# Patient Record
Sex: FEMALE | Race: WHITE | Employment: OTHER | URBAN - NONMETROPOLITAN AREA
[De-identification: names, ages, dates, MRNs, and addresses within clinical notes are randomized per-mention and may not be internally consistent; named-entity substitution may affect disease eponyms.]

---

## 2017-10-13 ENCOUNTER — HOSPITAL ENCOUNTER (OUTPATIENT)
Dept: VASCULAR LAB | Age: 47
Discharge: HOME | End: 2017-10-13
Payer: COMMERCIAL

## 2017-10-13 DIAGNOSIS — I73.9 CLAUDICATION IN PERIPHERAL VASCULAR DISEASE (HCC): ICD-10-CM

## 2017-10-13 PROCEDURE — 93923 UPR/LXTR ART STDY 3+ LVLS: CPT

## 2018-03-21 ENCOUNTER — APPOINTMENT (OUTPATIENT)
Dept: GENERAL RADIOLOGY | Age: 48
End: 2018-03-21
Payer: COMMERCIAL

## 2018-03-21 ENCOUNTER — HOSPITAL ENCOUNTER (EMERGENCY)
Age: 48
Discharge: HOME | End: 2018-03-21
Payer: COMMERCIAL

## 2018-03-21 VITALS
SYSTOLIC BLOOD PRESSURE: 122 MMHG | OXYGEN SATURATION: 98 % | TEMPERATURE: 98.5 F | HEART RATE: 88 BPM | DIASTOLIC BLOOD PRESSURE: 76 MMHG | RESPIRATION RATE: 18 BRPM

## 2018-03-21 DIAGNOSIS — H66.90 ACUTE OTITIS MEDIA, UNSPECIFIED OTITIS MEDIA TYPE: Primary | ICD-10-CM

## 2018-03-21 DIAGNOSIS — J01.90 ACUTE NON-RECURRENT SINUSITIS, UNSPECIFIED LOCATION: ICD-10-CM

## 2018-03-21 LAB
DIRECT EXAM: NORMAL
Lab: NORMAL
Lab: NORMAL
SPECIMEN DESCRIPTION: NORMAL
SPECIMEN DESCRIPTION: NORMAL
STATUS: NORMAL
STATUS: NORMAL

## 2018-03-21 PROCEDURE — 99283 EMERGENCY DEPT VISIT LOW MDM: CPT

## 2018-03-21 PROCEDURE — 71046 X-RAY EXAM CHEST 2 VIEWS: CPT

## 2018-03-21 PROCEDURE — 96372 THER/PROPH/DIAG INJ SC/IM: CPT

## 2018-03-21 PROCEDURE — 87651 STREP A DNA AMP PROBE: CPT

## 2018-03-21 PROCEDURE — 6360000002 HC RX W HCPCS: Performed by: PHYSICIAN ASSISTANT

## 2018-03-21 PROCEDURE — 87804 INFLUENZA ASSAY W/OPTIC: CPT

## 2018-03-21 PROCEDURE — 6370000000 HC RX 637 (ALT 250 FOR IP): Performed by: PHYSICIAN ASSISTANT

## 2018-03-21 RX ORDER — AMOXICILLIN AND CLAVULANATE POTASSIUM 875; 125 MG/1; MG/1
1 TABLET, FILM COATED ORAL 2 TIMES DAILY
Qty: 20 TABLET | Refills: 0 | Status: SHIPPED | OUTPATIENT
Start: 2018-03-21 | End: 2018-03-31

## 2018-03-21 RX ORDER — DEXAMETHASONE SODIUM PHOSPHATE 10 MG/ML
6 INJECTION, SOLUTION INTRAMUSCULAR; INTRAVENOUS ONCE
Status: COMPLETED | OUTPATIENT
Start: 2018-03-21 | End: 2018-03-21

## 2018-03-21 RX ORDER — AMOXICILLIN AND CLAVULANATE POTASSIUM 875; 125 MG/1; MG/1
1 TABLET, FILM COATED ORAL ONCE
Status: COMPLETED | OUTPATIENT
Start: 2018-03-21 | End: 2018-03-21

## 2018-03-21 RX ADMIN — AMOXICILLIN AND CLAVULANATE POTASSIUM 1 TABLET: 875; 125 TABLET, FILM COATED ORAL at 21:09

## 2018-03-21 RX ADMIN — DEXAMETHASONE SODIUM PHOSPHATE 6 MG: 10 INJECTION, SOLUTION INTRAMUSCULAR; INTRAVENOUS at 21:07

## 2018-03-21 ASSESSMENT — PAIN DESCRIPTION - LOCATION: LOCATION: EAR;NECK;HEAD

## 2018-03-21 ASSESSMENT — PAIN DESCRIPTION - DESCRIPTORS: DESCRIPTORS: ACHING

## 2018-03-21 ASSESSMENT — PAIN DESCRIPTION - FREQUENCY: FREQUENCY: CONTINUOUS

## 2018-03-21 ASSESSMENT — PAIN SCALES - GENERAL: PAINLEVEL_OUTOF10: 5

## 2018-03-21 ASSESSMENT — PAIN DESCRIPTION - PAIN TYPE: TYPE: ACUTE PAIN

## 2018-03-22 LAB
DIRECT EXAM: NORMAL
DIRECT EXAM: NORMAL
Lab: NORMAL
SPECIMEN DESCRIPTION: NORMAL
SPECIMEN DESCRIPTION: NORMAL
STATUS: NORMAL

## 2018-03-22 ASSESSMENT — ENCOUNTER SYMPTOMS
DIARRHEA: 0
CHEST TIGHTNESS: 0
BLOOD IN STOOL: 0
BACK PAIN: 0
SORE THROAT: 0
WHEEZING: 0
EYE REDNESS: 0
CONSTIPATION: 0
ABDOMINAL PAIN: 0
EYE DISCHARGE: 0
NAUSEA: 0
RHINORRHEA: 0
SHORTNESS OF BREATH: 0
COUGH: 0
VOMITING: 0
SINUS PRESSURE: 1

## 2018-03-22 NOTE — ED PROVIDER NOTES
tracheal deviation, no edema, no erythema and normal range of motion present. No Brudzinski's sign and no Kernig's sign noted. Cardiovascular: Normal rate, regular rhythm and intact distal pulses. Exam reveals no gallop and no friction rub. No murmur heard. Pulmonary/Chest: Effort normal and breath sounds normal. No stridor. No respiratory distress. She has no wheezes. She has no rales. Abdominal: Soft. Bowel sounds are normal. She exhibits no distension. There is no tenderness. There is no rebound and no guarding. Musculoskeletal: Normal range of motion. She exhibits no edema, tenderness or deformity. Neurological: She is alert and oriented to person, place, and time. She has normal reflexes. No cranial nerve deficit. Skin: Skin is warm and dry. No rash noted. She is not diaphoretic. No erythema. Psychiatric: She has a normal mood and affect. Her behavior is normal.   Nursing note and vitals reviewed. DIAGNOSTIC RESULTS     EKG: All EKG's are interpreted by the Emergency Department Physician who either signs or Co-signs this chart in the absence of a cardiologist.      RADIOLOGY:   Non-plain film images such as CT, Ultrasound and MRI are read by the radiologist. Plain radiographic images are visualized and preliminarily interpreted by the emergency physician with the below findings:      Interpretation per the Radiologist below, if available at the time of this note:    XR CHEST STANDARD (2 VW)   Final Result   Impression:     1. No acute consolidation. Electronically Signed By: Kirk Tiwari   on  03/21/2018  19:38            ED BEDSIDE ULTRASOUND:   Performed by ED Physician - none    LABS:  Labs Reviewed   RAPID INFLUENZA A/B ANTIGENS   STREP SCREEN GROUP A THROAT   STREP A DNA PROBE, AMPLIFICATION       All other labs were within normal range or not returned as of this dictation.     EMERGENCY DEPARTMENT COURSE and DIFFERENTIAL DIAGNOSIS/MDM:   Vitals:    Vitals:    03/21/18 1812 BP: 122/76   Pulse: 88   Resp: 18   Temp: 98.5 °F (36.9 °C)   TempSrc: Tympanic   SpO2: 98%         MDM  78-year-old female who was seen by her primary care provider on Monday and told she might of had an ear infection. She presents with continued left ear discomfort and she states she thought she felt a little bump in her neck and it was painful. On exam, she has a bulging erythematous left TM she has a slight lymph nodes noted. There is full range of motion of the C-spine there is no muscular discomfort I think her discomfort is that the lymph node. She has some axillary discomfort and for sinuses. This point for completion I am going to screen for strep as well as influenza A get a chest x-ray she is a smoker with a cough. Rule out pneumonia bronchitis strep and influenza    Patient's workup is benign at this time. Again she has no headache has no neck pain she has discomfort along the lymph node which is on the same side where she does have an acute otitis media. I will switch her antibiotic to Augmentin. She has no meningeal signs. We will give her steroids. I have instructed her to call her primary care provider tomorrow she needs to be rechecked within 24 hours. Strict and specific return was having given this patient. She will otherwise receive his flank or 7 answered at length. She will otherwise return immediately to the ER with any new or worsening complaints. Procedures    FINAL IMPRESSION      1. Acute otitis media, unspecified otitis media type    2.  Acute non-recurrent sinusitis, unspecified location          DISPOSITION/PLAN   DISPOSITION Decision To Discharge 03/21/2018 08:42:15 PM      PATIENT REFERRED TO:  [facility] ED  [address]  [phone]    If symptoms worsen, As needed    [facility]    Schedule an appointment as soon as possible for a visit in 1 day        DISCHARGE

## 2018-11-28 ENCOUNTER — HOSPITAL ENCOUNTER (EMERGENCY)
Age: 48
Discharge: HOME | End: 2018-11-28
Payer: COMMERCIAL

## 2018-11-28 ENCOUNTER — APPOINTMENT (OUTPATIENT)
Dept: CT IMAGING | Age: 48
End: 2018-11-28
Payer: COMMERCIAL

## 2018-11-28 VITALS
BODY MASS INDEX: 33.74 KG/M2 | SYSTOLIC BLOOD PRESSURE: 102 MMHG | HEIGHT: 67 IN | TEMPERATURE: 96.8 F | HEART RATE: 69 BPM | WEIGHT: 215 LBS | OXYGEN SATURATION: 96 % | DIASTOLIC BLOOD PRESSURE: 67 MMHG | RESPIRATION RATE: 18 BRPM

## 2018-11-28 DIAGNOSIS — G89.29 ACUTE EXACERBATION OF CHRONIC LOW BACK PAIN: Primary | ICD-10-CM

## 2018-11-28 DIAGNOSIS — M54.50 ACUTE EXACERBATION OF CHRONIC LOW BACK PAIN: Primary | ICD-10-CM

## 2018-11-28 LAB
-: ABNORMAL
AMORPHOUS: ABNORMAL
BACTERIA: ABNORMAL
BILIRUBIN URINE: NEGATIVE
CASTS UA: ABNORMAL /LPF
COLOR: YELLOW
COMMENT UA: NORMAL
CRYSTALS, UA: ABNORMAL /HPF
EPITHELIAL CELLS UA: ABNORMAL /HPF (ref 0–25)
GLUCOSE URINE: NEGATIVE
KETONES, URINE: NEGATIVE
LEUKOCYTE ESTERASE, URINE: NEGATIVE
MUCUS: ABNORMAL
NITRITE, URINE: NEGATIVE
OTHER OBSERVATIONS UA: ABNORMAL
PH UA: 6 (ref 5–9)
PROTEIN UA: NEGATIVE
RBC UA: ABNORMAL /HPF (ref 0–2)
RENAL EPITHELIAL, UA: ABNORMAL /HPF
SPECIFIC GRAVITY UA: 1.01 (ref 1.01–1.02)
TRICHOMONAS: ABNORMAL
TURBIDITY: CLEAR
URINE HGB: NEGATIVE
UROBILINOGEN, URINE: NORMAL
WBC UA: ABNORMAL /HPF (ref 0–5)
YEAST: ABNORMAL

## 2018-11-28 PROCEDURE — 72131 CT LUMBAR SPINE W/O DYE: CPT

## 2018-11-28 PROCEDURE — 99284 EMERGENCY DEPT VISIT MOD MDM: CPT

## 2018-11-28 PROCEDURE — 6360000002 HC RX W HCPCS: Performed by: PHYSICIAN ASSISTANT

## 2018-11-28 PROCEDURE — 96372 THER/PROPH/DIAG INJ SC/IM: CPT

## 2018-11-28 PROCEDURE — 81001 URINALYSIS AUTO W/SCOPE: CPT

## 2018-11-28 RX ORDER — KETOROLAC TROMETHAMINE 15 MG/ML
15 INJECTION, SOLUTION INTRAMUSCULAR; INTRAVENOUS ONCE
Status: COMPLETED | OUTPATIENT
Start: 2018-11-28 | End: 2018-11-28

## 2018-11-28 RX ORDER — ORPHENADRINE CITRATE 30 MG/ML
60 INJECTION INTRAMUSCULAR; INTRAVENOUS ONCE
Status: COMPLETED | OUTPATIENT
Start: 2018-11-28 | End: 2018-11-28

## 2018-11-28 RX ORDER — CYCLOBENZAPRINE HCL 10 MG
10 TABLET ORAL 3 TIMES DAILY PRN
Qty: 21 TABLET | Refills: 0 | Status: SHIPPED | OUTPATIENT
Start: 2018-11-28 | End: 2018-12-08

## 2018-11-28 RX ADMIN — ORPHENADRINE CITRATE 60 MG: 30 INJECTION INTRAMUSCULAR; INTRAVENOUS at 15:10

## 2018-11-28 RX ADMIN — KETOROLAC TROMETHAMINE 15 MG: 15 INJECTION, SOLUTION INTRAMUSCULAR; INTRAVENOUS at 15:09

## 2018-11-28 ASSESSMENT — ENCOUNTER SYMPTOMS
BACK PAIN: 1
WHEEZING: 0
EYE DISCHARGE: 0
DIARRHEA: 0
BLOOD IN STOOL: 0
RHINORRHEA: 0
SHORTNESS OF BREATH: 0
ABDOMINAL PAIN: 0
SORE THROAT: 0
EYE REDNESS: 0
COUGH: 0
VOMITING: 0
NAUSEA: 0
CHEST TIGHTNESS: 0
CONSTIPATION: 0

## 2018-11-28 ASSESSMENT — PAIN DESCRIPTION - PAIN TYPE: TYPE: CHRONIC PAIN

## 2018-11-28 ASSESSMENT — PAIN DESCRIPTION - LOCATION: LOCATION: BACK

## 2018-11-28 ASSESSMENT — PAIN DESCRIPTION - ORIENTATION: ORIENTATION: LOWER;MID

## 2018-11-28 ASSESSMENT — PAIN SCALES - GENERAL
PAINLEVEL_OUTOF10: 8
PAINLEVEL_OUTOF10: 6
PAINLEVEL_OUTOF10: 8

## 2018-11-29 NOTE — ED PROVIDER NOTES
[facility] ED  eMERGENCY dEPARTMENT eNCOUnter      Pt Name: Kathy Walters  MRN: [id number]  [place] 1970  Date of evaluation: 11/28/2018  Provider: Jose Christensen, [facility]     Chief Complaint   Patient presents with    Back Pain     Lumbar, chronic per pt. Pt states she is in pain management, but could not get an appt today         HISTORY OF PRESENT ILLNESS   (Location/Symptom, Timing/Onset, Context/Setting,Quality, Duration, Modifying Factors, Severity)  Note limiting factors. Kathy Walters is a52 y.o. female who presents to the emergency departmentWith acute exacerbation of her chronic lower back pain. She reports that it hurt today and has continued to hurt even more after she had a procedure done over a week and a half ago. She denies any fever or chills. Denies any abdominal pain. She denies any nausea or vomiting. She reports she couldn't get into her pain management today but she is seen him tomorrow so she came to the ER for pain relief. She denies any nausea or vomiting to me which was mentioned in triage. She denies any foul or bladder incontinence. She denies any saddle anesthesia. She rates her discomfort a 6 out of 10. She otherwise has no other complaints at this time. Denies any numbness or tingling sensations. HPI    Nursing Notes werereviewed. REVIEW OF SYSTEMS    (2-9 systems for level 4, 10 or more for level 5)     Review of Systems   Constitutional: Negative for chills, diaphoresis and fever. HENT: Negative for congestion, ear pain, rhinorrhea and sore throat. Eyes: Negative for discharge, redness and visual disturbance. Respiratory: Negative for cough, chest tightness, shortness of breath and wheezing. Cardiovascular: Negative for chest pain and palpitations. Gastrointestinal: Negative for abdominal pain, blood in stool, constipation, diarrhea, nausea and vomiting. Endocrine: Negative for polydipsia, polyphagia and polyuria. Smokeless tobacco: Never Used      Comment: States quit today    Alcohol use No    Drug use: No    Sexual activity: Not Asked     Other Topics Concern    None     Social History Narrative    None       SCREENINGS      @FLOW([id number])@      PHYSICAL EXAM    (up to 7 for level 4, 8 or more for level 5)     ED Triage Vitals [11/28/18 1358]   BP Temp Temp Source Pulse Resp SpO2 Height Weight   102/67 96.8 °F (36 °C) Tympanic 69 18 96 % 5' 7\" (1.702 m) 215 lb (97.5 kg)       Physical Exam   Constitutional: She is oriented to person, place, and time. She appears well-developed and well-nourished. No distress. HENT:   Head: Normocephalic and atraumatic. Right Ear: External ear normal.   Left Ear: External ear normal.   Mouth/Throat: Oropharynx is clear and moist. No oropharyngeal exudate. Eyes: Pupils are equal, round, and reactive to light. Conjunctivae and EOM are normal. Right eye exhibits no discharge. Left eye exhibits no discharge. No scleral icterus. Neck: Normal range of motion. Neck supple. No tracheal deviation present. Cardiovascular: Normal rate, regular rhythm and intact distal pulses. Exam reveals no gallop and no friction rub. No murmur heard. Pulmonary/Chest: Effort normal and breath sounds normal. No stridor. No respiratory distress. She has no wheezes. She has no rales. Abdominal: Soft. Bowel sounds are normal. She exhibits no distension and no mass. There is no tenderness. There is no rigidity, no rebound, no guarding, no CVA tenderness, no tenderness at McBurney's point and negative Rausch's sign. Musculoskeletal: Normal range of motion. She exhibits no edema, tenderness or deformity. Paraspinous discomfort of the lumbar spine there is no significant bony midline tenderness. No bony step-off. No saddle anesthesia. Patient's able to stand up and walk around the ER go up on her toes back on her heels squat down. No rashes no lesions.   Full range motion of bilateral lower and Given 11/28/18 1510)       New Prescriptions from this visit:    Discharge Medication List as of 11/28/2018  4:42 PM      START taking these medications    Details   cyclobenzaprine (FLEXERIL) 10 MG tablet Take 1 tablet by mouth 3 times daily as needed for Muscle spasms Do not take with Ultram or Ativan, Disp-21 tablet, R-0Print             Follow-up:  [facility] ED  [address]  [phone]    If symptoms worsen, As needed    Valerie Martines, [address] [facility]    Schedule an appointment as soon as possible for a visit in 1 day      Nicole Everett, [address] [place] [address] [phone]    Schedule an appointment as soon as possible for a visit   Call to arrange follow up with back specialist        Final Impression:   1.  Acute exacerbation of chronic low back pain               (Please note that portions of this note were completed with a voice recognition program.  Efforts were made to edit the dictations but occasionally words are mis-transcribed.)            Khoi Kwong PA-C  11/28/18 0797

## 2019-05-13 ENCOUNTER — HOSPITAL ENCOUNTER (OUTPATIENT)
Dept: NUTRITION | Age: 49
Discharge: HOME | End: 2019-05-13
Payer: COMMERCIAL

## 2019-05-13 PROCEDURE — 97802 MEDICAL NUTRITION INDIV IN: CPT

## 2019-05-21 VITALS — WEIGHT: 213 LBS | BODY MASS INDEX: 33.43 KG/M2 | HEIGHT: 67 IN

## 2019-10-25 ENCOUNTER — HOSPITAL ENCOUNTER (OUTPATIENT)
Dept: VASCULAR LAB | Age: 49
Discharge: HOME | End: 2019-10-27
Payer: COMMERCIAL

## 2019-10-25 DIAGNOSIS — R09.89 SUBJECTIVE CAROTID BRUIT: ICD-10-CM

## 2019-10-25 PROCEDURE — 93880 EXTRACRANIAL BILAT STUDY: CPT

## 2020-01-24 ENCOUNTER — HOSPITAL ENCOUNTER (OUTPATIENT)
Age: 50
Discharge: HOME | End: 2020-01-24
Payer: COMMERCIAL

## 2020-01-24 ENCOUNTER — HOSPITAL ENCOUNTER (OUTPATIENT)
Age: 50
Discharge: HOME | End: 2020-01-26
Payer: COMMERCIAL

## 2020-01-24 ENCOUNTER — HOSPITAL ENCOUNTER (OUTPATIENT)
Dept: GENERAL RADIOLOGY | Age: 50
Discharge: HOME | End: 2020-01-26
Payer: COMMERCIAL

## 2020-01-24 LAB
-: ABNORMAL
AMORPHOUS: ABNORMAL
BACTERIA: ABNORMAL
BILIRUBIN URINE: NEGATIVE
CASTS UA: ABNORMAL /LPF
COLOR: ABNORMAL
COMMENT UA: ABNORMAL
CRYSTALS, UA: ABNORMAL /HPF
EKG ATRIAL RATE: 58 BPM
EKG P AXIS: 42 DEGREES
EKG P-R INTERVAL: 132 MS
EKG Q-T INTERVAL: 418 MS
EKG QRS DURATION: 82 MS
EKG QTC CALCULATION (BAZETT): 410 MS
EKG R AXIS: 69 DEGREES
EKG T AXIS: 60 DEGREES
EKG VENTRICULAR RATE: 58 BPM
EPITHELIAL CELLS UA: ABNORMAL /HPF (ref 0–25)
GLUCOSE URINE: NEGATIVE
KETONES, URINE: NEGATIVE
LEUKOCYTE ESTERASE, URINE: NEGATIVE
MUCUS: ABNORMAL
NITRITE, URINE: NEGATIVE
OTHER OBSERVATIONS UA: ABNORMAL
PH UA: 6 (ref 5–9)
PROTEIN UA: NEGATIVE
RBC UA: ABNORMAL /HPF (ref 0–2)
RENAL EPITHELIAL, UA: ABNORMAL /HPF
SPECIFIC GRAVITY UA: 1 (ref 1.01–1.02)
TRICHOMONAS: ABNORMAL
TURBIDITY: CLEAR
URINE HGB: NEGATIVE
UROBILINOGEN, URINE: NORMAL
WBC UA: ABNORMAL /HPF (ref 0–5)
YEAST: ABNORMAL

## 2020-01-24 PROCEDURE — 87086 URINE CULTURE/COLONY COUNT: CPT

## 2020-01-24 PROCEDURE — 93005 ELECTROCARDIOGRAM TRACING: CPT | Performed by: NURSE PRACTITIONER

## 2020-01-24 PROCEDURE — 93010 ELECTROCARDIOGRAM REPORT: CPT | Performed by: INTERNAL MEDICINE

## 2020-01-24 PROCEDURE — 71046 X-RAY EXAM CHEST 2 VIEWS: CPT

## 2020-01-24 PROCEDURE — 81001 URINALYSIS AUTO W/SCOPE: CPT

## 2020-01-25 LAB
CULTURE: NO GROWTH
Lab: NORMAL
SPECIMEN DESCRIPTION: NORMAL

## 2020-12-14 ENCOUNTER — HOSPITAL ENCOUNTER (OUTPATIENT)
Dept: GENERAL RADIOLOGY | Age: 50
Discharge: HOME | End: 2020-12-16
Payer: COMMERCIAL

## 2020-12-14 ENCOUNTER — HOSPITAL ENCOUNTER (OUTPATIENT)
Age: 50
Discharge: HOME | End: 2020-12-16
Payer: COMMERCIAL

## 2020-12-14 ENCOUNTER — HOSPITAL ENCOUNTER (OUTPATIENT)
Age: 50
Discharge: HOME | End: 2020-12-14
Payer: COMMERCIAL

## 2020-12-14 LAB
CK MB: 3.7 NG/ML
TOTAL CK: 311 U/L (ref 26–192)
TROPONIN INTERP: NORMAL
TROPONIN T: NORMAL NG/ML
TROPONIN, HIGH SENSITIVITY: 6 NG/L (ref 0–14)

## 2020-12-14 PROCEDURE — 84484 ASSAY OF TROPONIN QUANT: CPT

## 2020-12-14 PROCEDURE — 82550 ASSAY OF CK (CPK): CPT

## 2020-12-14 PROCEDURE — 36415 COLL VENOUS BLD VENIPUNCTURE: CPT

## 2020-12-14 PROCEDURE — 71045 X-RAY EXAM CHEST 1 VIEW: CPT

## 2020-12-14 PROCEDURE — 82553 CREATINE MB FRACTION: CPT

## 2021-01-19 ENCOUNTER — HOSPITAL ENCOUNTER (OUTPATIENT)
Age: 51
Discharge: HOME | End: 2021-01-19
Payer: COMMERCIAL

## 2021-01-19 ENCOUNTER — HOSPITAL ENCOUNTER (OUTPATIENT)
Dept: CT IMAGING | Age: 51
Discharge: HOME | End: 2021-01-21
Payer: COMMERCIAL

## 2021-01-19 DIAGNOSIS — R10.13 EPIGASTRIC PAIN: ICD-10-CM

## 2021-01-19 DIAGNOSIS — E78.2 MIXED HYPERLIPIDEMIA: ICD-10-CM

## 2021-01-19 DIAGNOSIS — Z00.00 WELLNESS EXAMINATION: ICD-10-CM

## 2021-01-19 DIAGNOSIS — R73.03 PREDIABETES: ICD-10-CM

## 2021-01-19 LAB
ALBUMIN SERPL-MCNC: 4.5 G/DL (ref 3.5–5.2)
ALBUMIN/GLOBULIN RATIO: 1.6 (ref 1–2.5)
ALP BLD-CCNC: 81 U/L (ref 35–104)
ALT SERPL-CCNC: 24 U/L (ref 5–33)
ANION GAP SERPL CALCULATED.3IONS-SCNC: 6 MMOL/L (ref 9–17)
AST SERPL-CCNC: 20 U/L
BILIRUB SERPL-MCNC: 0.37 MG/DL (ref 0.3–1.2)
BUN BLDV-MCNC: 14 MG/DL (ref 6–20)
BUN/CREAT BLD: 25 (ref 9–20)
CALCIUM SERPL-MCNC: 10.1 MG/DL (ref 8.6–10.4)
CHLORIDE BLD-SCNC: 104 MMOL/L (ref 98–107)
CO2: 30 MMOL/L (ref 20–31)
CREAT SERPL-MCNC: 0.57 MG/DL (ref 0.5–0.9)
GFR AFRICAN AMERICAN: >60 ML/MIN
GFR NON-AFRICAN AMERICAN: >60 ML/MIN
GFR SERPL CREATININE-BSD FRML MDRD: ABNORMAL ML/MIN/{1.73_M2}
GFR SERPL CREATININE-BSD FRML MDRD: ABNORMAL ML/MIN/{1.73_M2}
GLUCOSE BLD-MCNC: 117 MG/DL (ref 70–99)
HCT VFR BLD CALC: 44 % (ref 36.3–47.1)
HEMOGLOBIN: 14.6 G/DL (ref 11.9–15.1)
MCH RBC QN AUTO: 30.3 PG (ref 25.2–33.5)
MCHC RBC AUTO-ENTMCNC: 33.2 G/DL (ref 28.4–34.8)
MCV RBC AUTO: 91.3 FL (ref 82.6–102.9)
NRBC AUTOMATED: 0 PER 100 WBC
PDW BLD-RTO: 12.2 % (ref 11.8–14.4)
PLATELET # BLD: 216 K/UL (ref 138–453)
PMV BLD AUTO: 10.7 FL (ref 8.1–13.5)
POTASSIUM SERPL-SCNC: 4.5 MMOL/L (ref 3.7–5.3)
RBC # BLD: 4.82 M/UL (ref 3.95–5.11)
SODIUM BLD-SCNC: 140 MMOL/L (ref 135–144)
TOTAL PROTEIN: 7.3 G/DL (ref 6.4–8.3)
WBC # BLD: 10.3 K/UL (ref 3.5–11.3)

## 2021-01-19 PROCEDURE — 6360000004 HC RX CONTRAST MEDICATION: Performed by: NURSE PRACTITIONER

## 2021-01-19 PROCEDURE — 36415 COLL VENOUS BLD VENIPUNCTURE: CPT

## 2021-01-19 PROCEDURE — 83036 HEMOGLOBIN GLYCOSYLATED A1C: CPT

## 2021-01-19 PROCEDURE — 80053 COMPREHEN METABOLIC PANEL: CPT

## 2021-01-19 PROCEDURE — 85027 COMPLETE CBC AUTOMATED: CPT

## 2021-01-19 PROCEDURE — 80061 LIPID PANEL: CPT

## 2021-01-19 PROCEDURE — 74177 CT ABD & PELVIS W/CONTRAST: CPT

## 2021-01-19 RX ADMIN — IOPAMIDOL 75 ML: 755 INJECTION, SOLUTION INTRAVENOUS at 15:20

## 2021-01-19 RX ADMIN — IOPAMIDOL 18 ML: 755 INJECTION, SOLUTION INTRAVENOUS at 14:31

## 2021-01-20 LAB
CHOLESTEROL/HDL RATIO: 4.3
CHOLESTEROL: 208 MG/DL
ESTIMATED AVERAGE GLUCOSE: 117 MG/DL
HBA1C MFR BLD: 5.7 % (ref 4–6)
HDLC SERPL-MCNC: 48 MG/DL
LDL CHOLESTEROL: 107 MG/DL (ref 0–130)
TRIGL SERPL-MCNC: 266 MG/DL
VLDLC SERPL CALC-MCNC: ABNORMAL MG/DL (ref 1–30)

## 2021-01-21 ENCOUNTER — OFFICE VISIT (OUTPATIENT)
Dept: SURGERY | Age: 51
End: 2021-01-21
Payer: COMMERCIAL

## 2021-01-21 VITALS
SYSTOLIC BLOOD PRESSURE: 115 MMHG | HEART RATE: 78 BPM | HEIGHT: 67 IN | BODY MASS INDEX: 34.11 KG/M2 | DIASTOLIC BLOOD PRESSURE: 70 MMHG | TEMPERATURE: 98.1 F | WEIGHT: 217.3 LBS

## 2021-01-21 DIAGNOSIS — Z72.0 TOBACCO ABUSE: ICD-10-CM

## 2021-01-21 DIAGNOSIS — R10.13 EPIGASTRIC PAIN: Primary | ICD-10-CM

## 2021-01-21 DIAGNOSIS — R19.7 DIARRHEA, UNSPECIFIED TYPE: ICD-10-CM

## 2021-01-21 DIAGNOSIS — Z83.79 FAMILY HISTORY OF COLITIS: ICD-10-CM

## 2021-01-21 PROCEDURE — 99211 OFF/OP EST MAY X REQ PHY/QHP: CPT

## 2021-01-21 PROCEDURE — G8417 CALC BMI ABV UP PARAM F/U: HCPCS | Performed by: SURGERY

## 2021-01-21 PROCEDURE — G8427 DOCREV CUR MEDS BY ELIG CLIN: HCPCS | Performed by: SURGERY

## 2021-01-21 PROCEDURE — 99202 OFFICE O/P NEW SF 15 MIN: CPT | Performed by: SURGERY

## 2021-01-21 PROCEDURE — 3017F COLORECTAL CA SCREEN DOC REV: CPT | Performed by: SURGERY

## 2021-01-21 PROCEDURE — G8484 FLU IMMUNIZE NO ADMIN: HCPCS | Performed by: SURGERY

## 2021-01-21 PROCEDURE — 4004F PT TOBACCO SCREEN RCVD TLK: CPT | Performed by: SURGERY

## 2021-02-07 ENCOUNTER — HOSPITAL ENCOUNTER (EMERGENCY)
Age: 51
Discharge: HOME | End: 2021-02-07
Payer: COMMERCIAL

## 2021-02-07 VITALS
DIASTOLIC BLOOD PRESSURE: 72 MMHG | RESPIRATION RATE: 16 BRPM | HEART RATE: 64 BPM | SYSTOLIC BLOOD PRESSURE: 118 MMHG | OXYGEN SATURATION: 99 % | BODY MASS INDEX: 34.46 KG/M2 | TEMPERATURE: 99 F | WEIGHT: 220 LBS

## 2021-02-07 DIAGNOSIS — N39.0 URINARY TRACT INFECTION WITHOUT HEMATURIA, SITE UNSPECIFIED: Primary | ICD-10-CM

## 2021-02-07 LAB
-: ABNORMAL
AMORPHOUS: ABNORMAL
BACTERIA: ABNORMAL
BILIRUBIN URINE: NEGATIVE
CASTS UA: ABNORMAL /LPF
COLOR: YELLOW
COMMENT UA: ABNORMAL
CRYSTALS, UA: ABNORMAL /HPF
EPITHELIAL CELLS UA: ABNORMAL /HPF (ref 0–25)
GLUCOSE URINE: NEGATIVE
KETONES, URINE: NEGATIVE
LEUKOCYTE ESTERASE, URINE: ABNORMAL
MUCUS: ABNORMAL
NITRITE, URINE: NEGATIVE
OTHER OBSERVATIONS UA: ABNORMAL
PH UA: 6 (ref 5–9)
PROTEIN UA: NEGATIVE
RBC UA: ABNORMAL /HPF (ref 0–2)
RENAL EPITHELIAL, UA: ABNORMAL /HPF
SPECIFIC GRAVITY UA: 1.01 (ref 1.01–1.02)
TRICHOMONAS: ABNORMAL
TURBIDITY: CLEAR
URINE HGB: NEGATIVE
UROBILINOGEN, URINE: NORMAL
WBC UA: ABNORMAL /HPF (ref 0–5)
YEAST: ABNORMAL

## 2021-02-07 PROCEDURE — 81001 URINALYSIS AUTO W/SCOPE: CPT

## 2021-02-07 PROCEDURE — 99284 EMERGENCY DEPT VISIT MOD MDM: CPT

## 2021-02-07 RX ORDER — GREEN TEA/HOODIA GORDONII 315-12.5MG
1 CAPSULE ORAL 2 TIMES DAILY
Qty: 60 TABLET | Refills: 0 | Status: SHIPPED | OUTPATIENT
Start: 2021-02-07 | End: 2021-03-09

## 2021-02-07 RX ORDER — NITROFURANTOIN 25; 75 MG/1; MG/1
100 CAPSULE ORAL 2 TIMES DAILY
Qty: 20 CAPSULE | Refills: 0 | Status: SHIPPED | OUTPATIENT
Start: 2021-02-07 | End: 2021-02-17

## 2021-02-07 RX ORDER — PHENAZOPYRIDINE HYDROCHLORIDE 100 MG/1
100 TABLET, FILM COATED ORAL 3 TIMES DAILY PRN
Qty: 9 TABLET | Refills: 0 | Status: SHIPPED | OUTPATIENT
Start: 2021-02-07 | End: 2021-02-10

## 2021-02-07 ASSESSMENT — PAIN SCALES - GENERAL: PAINLEVEL_OUTOF10: 4

## 2021-02-07 ASSESSMENT — PAIN DESCRIPTION - PAIN TYPE: TYPE: ACUTE PAIN

## 2021-02-07 ASSESSMENT — PAIN - FUNCTIONAL ASSESSMENT: PAIN_FUNCTIONAL_ASSESSMENT: 0-10

## 2021-02-07 NOTE — ED PROVIDER NOTES
EMERGENCY DEPARTMENT ENCOUNTER      CHIEF COMPLAINT    Dysuria (pt states painful urination- recently finished Keflex for the same, pt states the symptoms never got better. Pt recently had a CT ofher abd for ongoing abdominal issues)       HPI    Francis Marsh is a 48 y.o. female who presents To the emergency department with complaint of urinary symptoms. The patient reports burning on urination, frequency of urination, urgency. The patient denies abdominal pain, fever, vaginal discharge. She has no change with any patient interventions and the symptoms are aggravated by urinating. The patient reports that the symptoms are spontaneous. She reports the symptoms are intermittent in nature and currently present. The patient describes symptoms as moderate. She describes burning with urination. Location is in the suprapubic area. The symptoms began gradually, days ago    REVIEW OF SYSTEMS     Constitutional: Negative for fever, chills,  no fatigue. Skin: Negative for rash, itching  HENT: Negative for hearing loss, ear pain, nosebleeds, congestion, sore throat, rhinorrhea and sinus pain. Eyes: Negative for blurred vision, double vision, eye pain, eye discharge, eye redness and eye watering. Cardiovascular: Negative for chest pain, dyspnea on exertion, palpitations, orthopnea, claudication, leg swelling and PND. Respiratory: Negative for cough. No shortness of breath, wheezing or stridor. Gastrointestinal: Negative for heartburn, nausea, vomiting, abdominal pain, diarrhea, constipation, blood in stool, melena. Genitourinary: see HPI  Musculoskeletal: Negative for myalgias, back pain, joint pain and falls. Neurological: Negative for dizziness, tingling, sensory change, speech change, focal weakness, seizures, loss of consciousness and headaches.        PAST MEDICAL HISTORY    Past Medical History:   Diagnosis Date    Anxiety     Bipolar affective disorder ([facility] [address]     Chronic low back pain     Depression     Hyperlipidemia     Hypertension     Irritable bowel syndrome     Osteoarthritis     Schizophrenia, schizo-affective type, depressed ([address]     has had ECT tx in past        SURGICAL HISTORY    Past Surgical History:   Procedure Laterality Date    ABDOMEN SURGERY  1988    exploratory lap, ovarian tubal abscess rupture    APPENDECTOMY  1995    CARDIAC CATHETERIZATION  2013    NORMAL    CHOLECYSTECTOMY  1995    HYSTERECTOMY, TOTAL ABDOMINAL  2003    1/2 of right ovary retained   [address] [facility]  01/30/2020    [facility].  TONSILLECTOMY  1974    WISDOM TOOTH EXTRACTION          CURRENT MEDICATIONS    No current facility-administered medications for this encounter. Current Outpatient Medications   Medication Sig Dispense Refill    nitrofurantoin, macrocrystal-monohydrate, (MACROBID) 100 MG capsule Take 1 capsule by mouth 2 times daily for 10 days 20 capsule 0    phenazopyridine (PYRIDIUM) 100 MG tablet Take 1 tablet by mouth 3 times daily as needed for Pain 9 tablet 0    Probiotic Acidophilus (FLORANEX) TABS Take 1 tablet by mouth 2 times daily 60 tablet 0    sucralfate (CARAFATE) 1 GM tablet TAKE ONE TABLET BY MOUTH FOUR TIMES A DAY FOR FOURTEEN DAYS 60 tablet 5    atenolol (TENORMIN) 100 MG tablet TAKE 1/2 TABLET BY MOUTH DAILY (Patient taking differently: 100 mg daily ) 90 tablet 0    pantoprazole (PROTONIX) 40 MG tablet Take 1 tablet by mouth every morning (before breakfast) 90 tablet 0    atorvastatin (LIPITOR) 80 MG tablet Take 1 tablet by mouth daily (Patient taking differently: Take 40 mg by mouth daily ) 30 tablet 3    HYDROcodone-acetaminophen (NORCO)  MG per tablet Take 1 tablet by mouth 3 times daily.  sertraline (ZOLOFT) 100 MG tablet Take 100 mg by mouth daily      gabapentin (NEURONTIN) 300 MG capsule Take 300 mg by mouth 3 times daily.        iloperidone (FANAPT) 6 MG tablet Take 3 mg by mouth 2 times daily       EPINEPHrine (EPIPEN 2-SHLOMO) 0.3 MG/0.3ML SOAJ injection Use as directed for allergic reaction (Patient not taking: Reported on 1/25/2021) 1 each 1        ALLERGIES    Allergies   Allergen Reactions    Bee Venom Shortness Of Breath and Swelling    Sulfa Antibiotics Anaphylaxis and Rash    Ciprofloxacin Hcl Other (See Comments)     Abdominal cramping          FAMILY HISTORY    Family History   Problem Relation Age of Onset    High Blood Pressure Mother     Emphysema Mother    Michelle Cushing Arthritis Mother     Other Mother         cholaginis cholitis    Heart Attack Father     Stroke Father     Diabetes Father     High Blood Pressure Father     High Cholesterol Father     Emphysema Father     Diabetes Brother     High Blood Pressure Brother     Diabetes Maternal Grandmother     High Blood Pressure Maternal Grandmother     Heart Disease Maternal Grandfather         SOCIAL HISTORY    Social History     Socioeconomic History    Marital status:      Spouse name: Not on file    Number of children: Not on file    Years of education: Not on file    Highest education level: Not on file   Occupational History    Not on file   Social Needs    Financial resource strain: Not hard at all   [place] insecurity     Worry: Never true     Inability: Never true    Transportation needs     Medical: No     Non-medical: No   Tobacco Use    Smoking status: Current Every Day Smoker     Packs/day: 1.00     Years: 19.00     Pack years: 19.00    Smokeless tobacco: Never Used   Substance and Sexual Activity    Alcohol use: Yes     Frequency: Monthly or less     Drinks per session: 1 or 2     Binge frequency: Never    Drug use: No    Sexual activity: Yes     Partners: Female   Lifestyle    Physical activity     Days per week: 4 days     Minutes per session: 10 min    Stress:  Only a little   Relationships    Social connections     Talks on phone: Not on file     Gets together: Not on file     Attends Rastafarian service: Not on file Active member of club or organization: Not on file     Attends meetings of clubs or organizations: Not on file     Relationship status:     Intimate partner violence     Fear of current or ex partner: Not on file     Emotionally abused: No     Physically abused: No     Forced sexual activity: No   Other Topics Concern    Not on file   Social History Narrative    Not on file      PHYSICAL EXAM    /72   Pulse 64   Temp 99 °F (37.2 °C) (Tympanic)   Resp 16   Wt 220 lb (99.8 kg)   SpO2 99%   BMI 34.46 kg/m²    Constitutional: Oriented and well-developed, well-nourished, and in no distress. Non-toxic appearance. Eyes: Extra ocular muscles intact. . Pupils are equal, round, and reactive to light. No discharge. No scleral icterus. Neck: Normal range of motion and phonation normal. Neck supple. Cardiovascular: Regular rate and rhythm, normal heart sounds and intact distal pulses. No murmur heard. Pulmonary/Chest: Effort normal and breath sounds normal. No accessory muscle usage or stridor. Not tachypneic. No respiratory distress. No tenderness and no retraction. Abdominal: Soft and non-distended. Bowel sounds are normal. No masses or organomegaly. No significant tenderness. No rebound, no guarding and no CVA tenderness. No appreciable hernia. There is discomfortpressure with suprapubic palpation  Musculoskeletal: Normal range of motion. No edema and no tenderness. Neurological: Alert and oriented. Skin: Skin is warm and dry. No rash noted. No cyanosis or erythema. No pallor. Nails show no clubbing. Body mass index is 34.46 kg/m².     ED COURSE & MEDICAL DECISION MAKING    Orders Placed This Encounter   Procedures    Urinalysis Reflex to Culture    Microscopic Urinalysis     Results for orders placed or performed during the hospital encounter of 02/07/21   Urinalysis Reflex to Culture    Specimen: Urine, clean catch   Result Value Ref Range    Color, UA YELLOW YELLOW    Turbidity UA CLEAR CLEAR    Glucose, Ur NEGATIVE NEGATIVE    Bilirubin Urine NEGATIVE NEGATIVE    Ketones, Urine NEGATIVE NEGATIVE    Specific Gravity, UA 1.015 1.010 - 1.020    Urine Hgb NEGATIVE NEGATIVE    pH, UA 6.0 5.0 - 9.0    Protein, UA NEGATIVE NEGATIVE    Urobilinogen, Urine Normal Normal    Nitrite, Urine NEGATIVE NEGATIVE    Leukocyte Esterase, Urine TRACE (A) NEGATIVE    Urinalysis Comments NOT REPORTED    Microscopic Urinalysis   Result Value Ref Range    -          WBC, UA 2 TO 5 0 - 5 /HPF    RBC, UA None 0 - 2 /HPF    Casts UA NOT REPORTED /LPF    Crystals, UA NOT REPORTED None /HPF    Epithelial Cells UA 0 TO 2 0 - 25 /HPF    Renal Epithelial, UA NOT REPORTED 0 /HPF    Bacteria, UA TRACE (A) None    Mucus, UA NOT REPORTED None    Trichomonas, UA NOT REPORTED None    Amorphous, UA NOT REPORTED None    Other Observations UA NOT REPORTED NOT REQ. Yeast, UA NOT REPORTED None       The patient was seen independently by this provider, however the attending was available for consult during the entire visit. Impression:   1. Urinary tract infection without hematuria, site unspecified Stable       Disposition:   Considered pyelonephritis, urethritis, vulvovaginitis, PID/cervicitis, nephrolithiasis, appendicitis, diverticulitis. Currently able to comply with oral therapy. Stable vital signs. Nontoxic in appearance. Maintaining oral hydration. No significant comorbid disease. Will dc with follow up in 2-3 days with pcp.        ANÍBAL Chavez - JOSE  02/07/21 7893

## 2021-02-18 ASSESSMENT — ENCOUNTER SYMPTOMS
BACK PAIN: 1
NAUSEA: 1
VOMITING: 0
SORE THROAT: 0
TROUBLE SWALLOWING: 0
BLOOD IN STOOL: 0
COUGH: 1
CHOKING: 0
SHORTNESS OF BREATH: 0
ABDOMINAL DISTENTION: 1
ABDOMINAL PAIN: 1
DIARRHEA: 1

## 2021-02-19 NOTE — PROGRESS NOTES
Urmila Brady MD  General Surgery, Endoscopy  Chief Medical Officer    [facility]  [address] [address]  Dept: [phone]  Fax: [address]  Chief Complaint   Patient presents with    New Patient     GERD, c/o epigastric pain, nausea, diarrhea, coughing up clear mucus, x2 months, referred by Sandra Talley NP        HPI    Ms Xuan Luevano is a pleasant 51-year-old white female kindly referred to me by Sandra Talley NP for evaluation of epigastric pain with reflux symptoms, nausea, and diarrhea. He is symptoms have been present for 2 months. The last 2 weeks she has been taking Protonix which has helped. Status post laparoscopic cholecystectomy age 32, appendectomy age 25, and prior surgery for tuboovarian abscess. Recently treated for UTI. No recent weight changes, 214 pounds, BMI 34. She has been told in the past that she has irritable bowel syndrome. Also history of anxiety, bipolar disorder, schizophrenia. Colonoscopy more than 10 years ago was normal as she recalls. Recent cough with mucus, no history of COVID-19. Mother with history of colitis treated with total abdominal colectomy at the [facility] clinic, age 46s per patient. Patient smokes 1 pack/day. Review of Systems   Constitutional: Negative for activity change, appetite change, chills, fever and unexpected weight change. HENT: Negative for nosebleeds, sneezing, sore throat and trouble swallowing. Eyes: Negative for visual disturbance. Respiratory: Positive for cough. Negative for choking and shortness of breath. Cardiovascular: Negative for chest pain, palpitations and leg swelling. Gastrointestinal: Positive for abdominal distention, abdominal pain, diarrhea and nausea. Negative for blood in stool and vomiting. Genitourinary: Negative for dysuria, flank pain and hematuria. Musculoskeletal: Positive for arthralgias and back pain.  Negative for gait problem and myalgias. Allergic/Immunologic: Negative for immunocompromised state. Neurological: Negative for dizziness, seizures, syncope, weakness and headaches. Hematological: Does not bruise/bleed easily. Psychiatric/Behavioral: Positive for dysphoric mood. Negative for confusion and sleep disturbance. The patient is nervous/anxious. Past Medical History:   Diagnosis Date    Anxiety     Bipolar affective disorder ([address]     Chronic low back pain     Depression     Hyperlipidemia     Hypertension     Irritable bowel syndrome     Osteoarthritis     Schizophrenia, schizo-affective type, depressed ([address]     has had ECT tx in past       Past Surgical History:   Procedure Laterality Date    ABDOMEN SURGERY  1988    exploratory lap, ovarian tubal abscess rupture    APPENDECTOMY  1995    CARDIAC CATHETERIZATION  2013    NORMAL    CHOLECYSTECTOMY  1995    HYSTERECTOMY, TOTAL ABDOMINAL  2003    1/2 of right ovary retained   [address] [facility]  01/30/2020    [facility].     TONSILLECTOMY  1974    WISDOM TOOTH EXTRACTION         Family History   Problem Relation Age of Onset    High Blood Pressure Mother     Emphysema Mother    [facility] Arthritis Mother     Other Mother         cholaginis cholitis    Heart Attack Father     Stroke Father     Diabetes Father     High Blood Pressure Father     High Cholesterol Father     Emphysema Father     Diabetes Brother     High Blood Pressure Brother     Diabetes Maternal Grandmother     High Blood Pressure Maternal Grandmother     Heart Disease Maternal Grandfather        Allergies:  See list    Current Outpatient Medications   Medication Sig Dispense Refill    sucralfate (CARAFATE) 1 GM tablet TAKE ONE TABLET BY MOUTH FOUR TIMES A DAY FOR FOURTEEN DAYS 60 tablet 5    atenolol (TENORMIN) 100 MG tablet TAKE 1/2 TABLET BY MOUTH DAILY (Patient taking differently: 100 mg daily ) 90 tablet 0    pantoprazole (PROTONIX) 40 MG tablet Take 1 tablet by mouth every morning (before breakfast) 90 tablet 0    atorvastatin (LIPITOR) 80 MG tablet Take 1 tablet by mouth daily (Patient taking differently: Take 40 mg by mouth nightly ) 30 tablet 3    HYDROcodone-acetaminophen (NORCO)  MG per tablet Take 1 tablet by mouth 3 times daily.  sertraline (ZOLOFT) 100 MG tablet Take 100 mg by mouth daily      gabapentin (NEURONTIN) 300 MG capsule Take 300 mg by mouth 3 times daily.  iloperidone (FANAPT) 6 MG tablet Take 3 mg by mouth 2 times daily       Probiotic Acidophilus (FLORANEX) TABS Take 1 tablet by mouth 2 times daily 60 tablet 0    EPINEPHrine (EPIPEN 2-SHLOMO) 0.3 MG/0.3ML SOAJ injection Use as directed for allergic reaction 1 each 1     No current facility-administered medications for this visit. Social History     Socioeconomic History    Marital status:      Spouse name: Not on file    Number of children: Not on file    Years of education: Not on file    Highest education level: Not on file   Occupational History    Not on file   Social Needs    Financial resource strain: Not hard at all    Food insecurity     Worry: Never true     Inability: Never true   [employer] needs     Medical: No     Non-medical: No   Tobacco Use    Smoking status: Current Every Day Smoker     Packs/day: 1.00     Years: 19.00     Pack years: 19.00    Smokeless tobacco: Never Used   Substance and Sexual Activity    Alcohol use: Yes     Frequency: Monthly or less     Drinks per session: 1 or 2     Binge frequency: Never    Drug use: No    Sexual activity: Yes     Partners: Female   Lifestyle    Physical activity     Days per week: 4 days     Minutes per session: 10 min    Stress:  Only a little   Relationships    Social connections     Talks on phone: Not on file     Gets together: Not on file     Attends Baptism service: Not on file     Active member of club or organization: Not on file     Attends meetings of clubs or [place] [employer] - CNP [facility] CAT SCAN ROOM Yoko Pappas MD          Signed by    Signed Date/Time Phone Pager   [address] [facility], [address] [place] 1/19/2021  3:47 [phone]    Reading Providers    Read Date Phone Pager   [address] [facility], [address] [place] [address] Jan 19, 2021  3:47 [phone]    All Reviewers List    ANÍBAL Cade CNP on 1/19/2021 16:53   CT ABDOMEN PELVIS W IV CONTRAST Additional Contrast? Oral: Result Notes   [place]   1/21/2021  8:21 AM EST      Pt is informed of results and instructions given to pt. Whitney [place]   1/19/2021  4:58 PM EST      Message left for pt to call office    ANÍBAL López CNP   1/19/2021  4:53 PM EST      Bladder shows possible UTI, rest of CT is normal  Start keflex 500mg po tid x10 days  Push fluids, rest, f/u in 1 week.          Routing History    Priority Sent On From To Message Type    1/19/2021  4:53 PM Juan Carlos Bors, APRN - CNP [facility] Clinical Staff Result Notes    1/19/2021  3:50 PM Ricardo, Mhpn Incoming Radiant Results From ANÍBAL Ha CNP Results   Radiation Dose Estimates    No radiation information found for this patient   Narrative   EXAMINATION:   CT OF THE ABDOMEN AND PELVIS WITH CONTRAST 1/19/2021 3:28 pm       TECHNIQUE:   CT of the abdomen and pelvis was performed with the administration of   intravenous contrast. Multiplanar reformatted images are provided for review.    Dose modulation, iterative reconstruction, and/or weight based adjustment of   the mA/kV was utilized to reduce the radiation dose to as low as reasonably   achievable.       COMPARISON:   08/18/2016       HISTORY:   ORDERING SYSTEM PROVIDED HISTORY: Epigastric pain   TECHNOLOGIST PROVIDED HISTORY:           FINDINGS:   Lower Chest: Lung bases are clear without pulmonary nodules, masses,   effusions, or foci of airspace disease.  The base of the heart is normal in   size without pericardial fluid collection.       Organs: Status post cholecystectomy.  Hepatomegaly.  No focal hepatic mass   lesions.  Pancreas and spleen are unremarkable.       GI/Bowel: Bowel is without evidence for obstruction or inflammation.  No free   intraperitoneal air or fluid noted.  Small bowel loops are normal in caliber. No definite hiatal hernia.       Pelvis:  No evidence for free fluid.       Peritoneum/Retroperitoneum: The adrenal glands are normal in size and   configuration bilaterally.  Kidneys perfuse and excrete in a symmetric   fashion and ureters are not obstructed.  Generalized bladder wall thickening   noted concerning for cystitis. .       Bones/Soft Tissues: Nonspecific sclerosis of L1.  There was a hemangioma   identified within the L1 vertebral body on previous study.  Advanced   degenerative disease at L5-S1.       Vasculature:  The abdominal aorta is normal in caliber.  No discrete and   aneurysm or dissection.  No lymphadenopathy within the abdomen or pelvis.           Impression   No evidence for acute intra-abdominal or intrapelvic pathology.  No bowel   obstruction or inflammation.  No free intraperitoneal air or fluid.  No   evidence for urinary obstruction.       Generalized bladder wall thickening suggestive of cystitis.          1/19/2021  2:24 PM - Ricardo, pn Incoming Lab Results From [facility]    Component Value Ref Range & Units Status Collected Lab   WBC 10.3  3.5 - 11.3 k/uL Final 01/19/2021  2:12 PM [facility]   RBC 4.82  3.95 - 5.11 m/uL Final 01/19/2021  2:12 PM [facility]   Hemoglobin 14.6  11.9 - 15.1 g/dL Final 01/19/2021  2:12 PM [facility]   Hematocrit 44.0  36.3 - 47.1 % Final 01/19/2021  2:12 PM [facility]   MCV 91.3  82.6 - 102.9 fL Final 01/19/2021  2:12 PM [facility]   MCH 30.3  25.2 - 33.5 pg Final 01/19/2021  2:12 PM [facility]   MCHC 33.2  28.4 - 34.8 g/dL Final 01/19/2021  2:12 PM [facility]   RDW 12.2  11.8 - 14.4 % Final 01/19/2021  2:12 PM [facility]   Platelets 514  604 - 453 k/uL Final 01/19/2021  2:12 PM [facility]   MPV 10.7  8.1 - 13.5 fL Final 01/19/2021  2:12 PM [facility]   NRBC Automated 0.0  0.0 per 100 WBC Final 01/19/2021  2:12 PM [facility]         ASSESSMENT     Diagnosis Orders   1. Epigastric pain     2. Diarrhea, unspecified type     3. BMI 33.0-33.9,adult     4. Family history of colitis     5. Tobacco abuse          PLAN    Discussed at length with Ms. Rojas the nature of her various GI complaints and recent imaging. We will proceed with diagnostic endoscopy. Risks, benefits, alternatives thoroughly reviewed and accepted by Ms. Rojas including GI bleeding, perforation, missed lesions, COVID-19 exposure/infection, etc.  Discussed importance of healthy, bland diet, continuation of proton pump inhibition, and complete tobacco cessation. It would be beneficial to obtain more detailed medical records regarding her mother's reported total colectomy at the [facility].      Judd Del Toro MD

## 2021-02-19 NOTE — PATIENT INSTRUCTIONS
Patient Education        Upper GI Endoscopy: Before Your Procedure  What is an upper GI endoscopy? An upper gastrointestinal (or GI) endoscopy is a test that allows your doctor to look at the inside of your esophagus, stomach, and the first part of your small intestine, called the duodenum. The esophagus is the tube that carries food to your stomach. The doctor uses a thin, lighted tube that bends. It is called an endoscope, or scope. The doctor puts the tip of the scope in your mouth and gently moves it down your throat. The scope is a flexible video camera. The doctor looks at a monitor (like a TV set or a computer screen) as he or she moves the scope. A doctor may do this procedure to look for ulcers, tumors, infection, or bleeding. It also can be used to look for signs of acid backing up into your esophagus. This is called gastroesophageal reflux disease, or GERD. The doctor can use the scope to take a sample of tissue for study (a biopsy). The doctor also can use the scope to take out growths or stop bleeding. Follow-up care is a key part of your treatment and safety. Be sure to make and go to all appointments, and call your doctor if you are having problems. It's also a good idea to know your test results and keep a list of the medicines you take. How do you prepare for the procedure? Procedures can be stressful. This information will help you understand what you can expect. And it will help you safely prepare for your procedure. Preparing for the procedure    · Do not eat or drink anything for 6 to 8 hours before the test. An empty stomach helps your doctor see your stomach clearly during the test. It also reduces your chances of vomiting. If you vomit, there is a small risk that the vomit could enter your lungs.  (This is called aspiration.) If the test is done in an emergency, a tube may be inserted through your nose or mouth to empty your stomach.     · Do not take sucralfate (Carafate) or antacids on the day of the test. These medicines can make it hard for your doctor to see your upper GI tract.     · If your doctor tells you to, stop taking iron supplements 7 to 14 days before the test.     · Be sure you have someone to take you home. Anesthesia and pain medicine will make it unsafe for you to drive or get home on your own.     · Understand exactly what procedure is planned, along with the risks, benefits, and other options. · Tell your doctor ALL the medicines, vitamins, supplements, and herbal remedies you take. Some may increase the risk of problems during your procedure. Your doctor will tell you if you should stop taking any of them before the procedure and how soon to do it.     · If you take aspirin or some other blood thinner, ask your doctor if you should stop taking it before your procedure. Make sure that you understand exactly what your doctor wants you to do. These medicines increase the risk of bleeding.     · Make sure your doctor and the hospital have a copy of your advance directive. If you don't have one, you may want to prepare one. It lets others know your health care wishes. It's a good thing to have before any type of surgery or procedure. What happens on the day of the procedure? · Follow the instructions exactly about when to stop eating and drinking. If you don't, your procedure may be canceled. If your doctor told you to take your medicines on the day of the procedure, take them with only a sip of water.     · Take a bath or shower before you come in for your procedure. Do not apply lotions, perfumes, deodorants, or nail polish.     · Take off all jewelry and piercings. And take out contact lenses, if you wear them. At the hospital or surgery center   · Bring a picture ID.     · The test may take 15 to 30 minutes.     · The doctor may spray medicine on the back of your throat to numb it.  You also will get medicine to prevent pain and to relax you.     · You will lie on your left side. The doctor will put the scope in your mouth and toward the back of your throat. The doctor will tell you when to swallow. This helps the scope move down your throat. You will be able to breathe normally. The doctor will move the scope down your esophagus into your stomach. The doctor also may look at the duodenum.     · If your doctor wants to take a sample of tissue for a biopsy, he or she may use small surgical tools, which are put into the scope, to cut off some tissue. You will not feel a biopsy, if one is taken. The doctor also can use the tools to stop bleeding or to do other treatments, if needed.     · You will stay at the hospital or surgery center for 1 to 2 hours until the medicine you were given wears off. What happens after an upper GI endoscopy? · After the test, you may belch and feel bloated for a while.     · You may have a tickling, dry throat or mouth. You may feel a bit hoarse, and you may have a mild sore throat. These symptoms may last several days. Throat lozenges and warm saltwater gargles can help relieve the throat symptoms.     · Don't drive or operate machinery for 12 hours after the test.     · Your doctor will tell you when you can go back to your usual diet and activities.     · Don't drink alcohol for 12 to 24 hours after the test.   When should you call your doctor? · You have questions or concerns.     · You don't understand how to prepare for your procedure.     · You become ill before the procedure (such as fever, flu, or a cold).     · You need to reschedule or have changed your mind about having the procedure. Where can you learn more? Go to https://BomTrip.compeReclip.It.TV Pixie. org and sign in to your [employer] account. Enter P790 in the [employer] - /l?k â€¢ splik/ box to learn more about \"Upper GI Endoscopy: Before Your Procedure. \"     If you do not have an account, please click on the \"Sign Up Now\" link.   Current as of: April 15, 2020               Content Version: 12.6  © 8703-9909 [email or web address], Incorporated. Care instructions adapted under license by [facility] ([facility]). If you have questions about a medical condition or this instruction, always ask your healthcare professional. [address] any warranty or liability for your use of this information. Patient Education        Learning About Colonoscopy  What is a colonoscopy? A colonoscopy is a test (also called a procedure) that lets a doctor look inside your large intestine. The doctor uses a thin, lighted tube called a colonoscope. The doctor uses it to look for small growths called polyps, colon or rectal cancer (colorectal cancer), or other problems like bleeding. During the procedure, the doctor can take samples of tissue. The samples can then be checked for cancer or other conditions. The doctor can also take out polyps. How is a colonoscopy done? This procedure is done in a doctor's office or a clinic or hospital. You will get medicine to help you relax and not feel pain. Some people find that they don't remember having the test because of the medicine. The doctor gently moves the colonoscope, or scope, through the colon. The scope is also a small video camera. It lets the doctor see the colon and take pictures. How do you prepare for the procedure? You need to clean out your colon before the procedure so the doctor can see all of your colon. This process may start a day or two before the test. This depends on which \"colon prep\" your doctor recommends. To clean your colon, you stop eating solid foods and drink only clear liquids. You can have water, tea, coffee, clear juices, clear broths, flavored ice pops, and gelatin (such as Jell-O). Do not drink anything red or purple. The day or night before the procedure, you drink a large amount of a special liquid. This causes loose, frequent stools. You will go to the bathroom a lot. It's very important to drink all of the liquid. If you have problems drinking it, call your doctor. Some people don't go to work or do their usual activities on the day of the prep. Arrange to have someone take you home after the test.  What can you expect after a colonoscopy? Your doctor will tell you when you can eat and do your usual activities. Drink a lot of fluid after the test to replace the fluids you may have lost during the colon prep. But don't drink alcohol. Your doctor will talk to you about when you'll need your next colonoscopy. The results of your test and your risk for colorectal cancer will help your doctor decide how often you need to be checked. After the test, you may be bloated or have gas pains. You may need to pass gas. If a biopsy was done or a polyp was removed, you may have streaks of blood in your stool (feces) for a few days. If polyps were taken out, your doctor may tell you to avoid taking aspirin and nonsteroidal anti-inflammatory drugs (NSAIDs) for 7 to 14 days. Problems such as heavy rectal bleeding may not occur until several weeks after the test. This isn't common. But it can happen after polyps are removed. Follow-up care is a key part of your treatment and safety. Be sure to make and go to all appointments, and call your doctor if you are having problems. It's also a good idea to know your test results and keep a list of the medicines you take. Where can you learn more? Go to https://LikeListlesAvatrip.Pure Nootropics. org and sign in to your [employer] account. Enter G112 in the [facility] box to learn more about \"Learning About Colonoscopy. \"     If you do not have an account, please click on the \"Sign Up Now\" link. Current as of: April 29, 2020               Content Version: 12.6  © 0701-6672 [employer]lip, Incorporated. Care instructions adapted under license by [facility] ([facility]).  If you have questions about a medical condition or this instruction, always ask your healthcare professional. Olive Aguilar disclaims any warranty or liability for your use of this information.

## 2021-02-22 ENCOUNTER — HOSPITAL ENCOUNTER (OUTPATIENT)
Age: 51
Discharge: HOME | End: 2021-02-22
Payer: COMMERCIAL

## 2021-02-22 DIAGNOSIS — N30.01 ACUTE CYSTITIS WITH HEMATURIA: ICD-10-CM

## 2021-02-22 LAB
-: ABNORMAL
AMORPHOUS: ABNORMAL
BACTERIA: ABNORMAL
BILIRUBIN URINE: ABNORMAL
CASTS UA: ABNORMAL /LPF
COLOR: YELLOW
COMMENT UA: ABNORMAL
CRYSTALS, UA: ABNORMAL /HPF
EPITHELIAL CELLS UA: ABNORMAL /HPF (ref 0–25)
GLUCOSE URINE: NEGATIVE
KETONES, URINE: ABNORMAL
LEUKOCYTE ESTERASE, URINE: NEGATIVE
MUCUS: ABNORMAL
NITRITE, URINE: NEGATIVE
OTHER OBSERVATIONS UA: ABNORMAL
PH UA: 6 (ref 5–9)
PROTEIN UA: NEGATIVE
RBC UA: ABNORMAL /HPF (ref 0–2)
RENAL EPITHELIAL, UA: ABNORMAL /HPF
SPECIFIC GRAVITY UA: >1.03 (ref 1.01–1.02)
TRICHOMONAS: ABNORMAL
TURBIDITY: CLEAR
URINE HGB: NEGATIVE
UROBILINOGEN, URINE: NORMAL
WBC UA: ABNORMAL /HPF (ref 0–5)
YEAST: ABNORMAL

## 2021-02-22 PROCEDURE — 81001 URINALYSIS AUTO W/SCOPE: CPT

## 2021-02-22 PROCEDURE — 87086 URINE CULTURE/COLONY COUNT: CPT

## 2021-02-23 ENCOUNTER — HOSPITAL ENCOUNTER (OUTPATIENT)
Dept: PREADMISSION TESTING | Age: 51
Setting detail: SPECIMEN
Discharge: HOME | End: 2021-02-27
Payer: COMMERCIAL

## 2021-02-23 DIAGNOSIS — Z01.818 PREOPERATIVE TESTING: Primary | ICD-10-CM

## 2021-02-23 DIAGNOSIS — Z01.818 PREOPERATIVE TESTING: ICD-10-CM

## 2021-02-23 LAB
CULTURE: NO GROWTH
Lab: NORMAL
SPECIMEN DESCRIPTION: NORMAL

## 2021-02-23 PROCEDURE — C9803 HOPD COVID-19 SPEC COLLECT: HCPCS

## 2021-02-23 PROCEDURE — U0005 INFEC AGEN DETEC AMPLI PROBE: HCPCS

## 2021-02-23 PROCEDURE — U0003 INFECTIOUS AGENT DETECTION BY NUCLEIC ACID (DNA OR RNA); SEVERE ACUTE RESPIRATORY SYNDROME CORONAVIRUS 2 (SARS-COV-2) (CORONAVIRUS DISEASE [COVID-19]), AMPLIFIED PROBE TECHNIQUE, MAKING USE OF HIGH THROUGHPUT TECHNOLOGIES AS DESCRIBED BY CMS-2020-01-R: HCPCS

## 2021-02-23 RX ORDER — SODIUM, POTASSIUM,MAG SULFATES 17.5-3.13G
1 SOLUTION, RECONSTITUTED, ORAL ORAL ONCE
Qty: 1 KIT | Refills: 0 | Status: SHIPPED | OUTPATIENT
Start: 2021-02-23 | End: 2021-02-23

## 2021-02-24 LAB
SARS-COV-2: NORMAL
SARS-COV-2: NOT DETECTED
SOURCE: NORMAL

## 2021-03-01 ENCOUNTER — ANESTHESIA EVENT (OUTPATIENT)
Dept: OPERATING ROOM | Age: 51
End: 2021-03-01
Payer: COMMERCIAL

## 2021-03-02 ENCOUNTER — HOSPITAL ENCOUNTER (OUTPATIENT)
Age: 51
Setting detail: OUTPATIENT SURGERY
Discharge: HOME | End: 2021-03-02
Attending: SURGERY | Admitting: SURGERY
Payer: COMMERCIAL

## 2021-03-02 ENCOUNTER — ANESTHESIA (OUTPATIENT)
Dept: OPERATING ROOM | Age: 51
End: 2021-03-02
Payer: COMMERCIAL

## 2021-03-02 VITALS
RESPIRATION RATE: 16 BRPM | BODY MASS INDEX: 33.15 KG/M2 | TEMPERATURE: 98.6 F | SYSTOLIC BLOOD PRESSURE: 133 MMHG | OXYGEN SATURATION: 94 % | HEART RATE: 70 BPM | HEIGHT: 67 IN | DIASTOLIC BLOOD PRESSURE: 76 MMHG | WEIGHT: 211.2 LBS

## 2021-03-02 VITALS — SYSTOLIC BLOOD PRESSURE: 134 MMHG | DIASTOLIC BLOOD PRESSURE: 90 MMHG | OXYGEN SATURATION: 97 %

## 2021-03-02 PROBLEM — R10.13 EPIGASTRIC PAIN: Status: ACTIVE | Noted: 2021-03-02

## 2021-03-02 PROCEDURE — 7100000010 HC PHASE II RECOVERY - FIRST 15 MIN: Performed by: SURGERY

## 2021-03-02 PROCEDURE — 3700000001 HC ADD 15 MINUTES (ANESTHESIA): Performed by: SURGERY

## 2021-03-02 PROCEDURE — 3609012400 HC EGD TRANSORAL BIOPSY SINGLE/MULTIPLE: Performed by: SURGERY

## 2021-03-02 PROCEDURE — 2709999900 HC NON-CHARGEABLE SUPPLY: Performed by: SURGERY

## 2021-03-02 PROCEDURE — 45385 COLONOSCOPY W/LESION REMOVAL: CPT | Performed by: SURGERY

## 2021-03-02 PROCEDURE — 7100000011 HC PHASE II RECOVERY - ADDTL 15 MIN: Performed by: SURGERY

## 2021-03-02 PROCEDURE — 6360000002 HC RX W HCPCS: Performed by: NURSE ANESTHETIST, CERTIFIED REGISTERED

## 2021-03-02 PROCEDURE — 2580000003 HC RX 258: Performed by: SURGERY

## 2021-03-02 PROCEDURE — 3609010700 HC COLONOSCOPY POLYPECTOMY REMOVAL SNARE/STOMA: Performed by: SURGERY

## 2021-03-02 PROCEDURE — 2500000003 HC RX 250 WO HCPCS: Performed by: NURSE ANESTHETIST, CERTIFIED REGISTERED

## 2021-03-02 PROCEDURE — 3700000000 HC ANESTHESIA ATTENDED CARE: Performed by: SURGERY

## 2021-03-02 PROCEDURE — 88305 TISSUE EXAM BY PATHOLOGIST: CPT

## 2021-03-02 PROCEDURE — 87077 CULTURE AEROBIC IDENTIFY: CPT

## 2021-03-02 PROCEDURE — 43239 EGD BIOPSY SINGLE/MULTIPLE: CPT | Performed by: SURGERY

## 2021-03-02 PROCEDURE — 88342 IMHCHEM/IMCYTCHM 1ST ANTB: CPT

## 2021-03-02 RX ORDER — SODIUM CHLORIDE, SODIUM LACTATE, POTASSIUM CHLORIDE, CALCIUM CHLORIDE 600; 310; 30; 20 MG/100ML; MG/100ML; MG/100ML; MG/100ML
INJECTION, SOLUTION INTRAVENOUS CONTINUOUS
Status: DISCONTINUED | OUTPATIENT
Start: 2021-03-02 | End: 2021-03-02 | Stop reason: HOSPADM

## 2021-03-02 RX ORDER — LIDOCAINE HYDROCHLORIDE 20 MG/ML
INJECTION, SOLUTION EPIDURAL; INFILTRATION; INTRACAUDAL; PERINEURAL PRN
Status: DISCONTINUED | OUTPATIENT
Start: 2021-03-02 | End: 2021-03-02 | Stop reason: SDUPTHER

## 2021-03-02 RX ORDER — PROPOFOL 10 MG/ML
INJECTION, EMULSION INTRAVENOUS PRN
Status: DISCONTINUED | OUTPATIENT
Start: 2021-03-02 | End: 2021-03-02 | Stop reason: SDUPTHER

## 2021-03-02 RX ORDER — SODIUM CHLORIDE 0.9 % (FLUSH) 0.9 %
10 SYRINGE (ML) INJECTION PRN
Status: DISCONTINUED | OUTPATIENT
Start: 2021-03-02 | End: 2021-03-02 | Stop reason: HOSPADM

## 2021-03-02 RX ORDER — PROPOFOL 10 MG/ML
INJECTION, EMULSION INTRAVENOUS CONTINUOUS PRN
Status: DISCONTINUED | OUTPATIENT
Start: 2021-03-02 | End: 2021-03-02 | Stop reason: SDUPTHER

## 2021-03-02 RX ORDER — SODIUM CHLORIDE 0.9 % (FLUSH) 0.9 %
10 SYRINGE (ML) INJECTION EVERY 12 HOURS SCHEDULED
Status: DISCONTINUED | OUTPATIENT
Start: 2021-03-02 | End: 2021-03-02 | Stop reason: HOSPADM

## 2021-03-02 RX ADMIN — PROPOFOL 50 MG: 10 INJECTION, EMULSION INTRAVENOUS at 09:59

## 2021-03-02 RX ADMIN — SODIUM CHLORIDE, POTASSIUM CHLORIDE, SODIUM LACTATE AND CALCIUM CHLORIDE: 600; 310; 30; 20 INJECTION, SOLUTION INTRAVENOUS at 09:44

## 2021-03-02 RX ADMIN — PROPOFOL 125 MCG/KG/MIN: 10 INJECTION, EMULSION INTRAVENOUS at 09:53

## 2021-03-02 RX ADMIN — PROPOFOL 50 MG: 10 INJECTION, EMULSION INTRAVENOUS at 10:01

## 2021-03-02 RX ADMIN — LIDOCAINE HYDROCHLORIDE 100 MG: 20 INJECTION, SOLUTION EPIDURAL; INFILTRATION; INTRACAUDAL; PERINEURAL at 10:01

## 2021-03-02 RX ADMIN — SODIUM CHLORIDE, POTASSIUM CHLORIDE, SODIUM LACTATE AND CALCIUM CHLORIDE: 600; 310; 30; 20 INJECTION, SOLUTION INTRAVENOUS at 09:22

## 2021-03-02 RX ADMIN — LIDOCAINE HYDROCHLORIDE 100 MG: 20 INJECTION, SOLUTION EPIDURAL; INFILTRATION; INTRACAUDAL; PERINEURAL at 10:00

## 2021-03-02 ASSESSMENT — PAIN - FUNCTIONAL ASSESSMENT: PAIN_FUNCTIONAL_ASSESSMENT: 0-10

## 2021-03-02 ASSESSMENT — PAIN SCALES - GENERAL: PAINLEVEL_OUTOF10: 0

## 2021-03-02 ASSESSMENT — LIFESTYLE VARIABLES: SMOKING_STATUS: 1

## 2021-03-02 NOTE — H&P
HPI     Ms Digna Holman is a pleasant 59-year-old white female kindly referred to me by Erica Chen NP for evaluation of epigastric pain with reflux symptoms, nausea, and diarrhea. He is symptoms have been present for 2 months. The last 2 weeks she has been taking Protonix which has helped. Status post laparoscopic cholecystectomy age 32, appendectomy age 25, and prior surgery for tuboovarian abscess. Recently treated for UTI. No recent weight changes, 214 pounds, BMI 34. She has been told in the past that she has irritable bowel syndrome. Also history of anxiety, bipolar disorder, schizophrenia. Colonoscopy more than 10 years ago was normal as she recalls. Recent cough with mucus, no history of COVID-19. Mother with history of colitis treated with total abdominal colectomy at the [facility] clinic, age 46s per patient. Patient smokes 1 pack/day.     Review of Systems   Constitutional: Negative for activity change, appetite change, chills, fever and unexpected weight change. HENT: Negative for nosebleeds, sneezing, sore throat and trouble swallowing. Eyes: Negative for visual disturbance. Respiratory: Positive for cough. Negative for choking and shortness of breath. Cardiovascular: Negative for chest pain, palpitations and leg swelling. Gastrointestinal: Positive for abdominal distention, abdominal pain, diarrhea and nausea. Negative for blood in stool and vomiting. Genitourinary: Negative for dysuria, flank pain and hematuria. Musculoskeletal: Positive for arthralgias and back pain. Negative for gait problem and myalgias. Allergic/Immunologic: Negative for immunocompromised state. Neurological: Negative for dizziness, seizures, syncope, weakness and headaches. Hematological: Does not bruise/bleed easily. Psychiatric/Behavioral: Positive for dysphoric mood. Negative for confusion and sleep disturbance.  The patient is nervous/anxious.          Past Medical History        Past Medical History:   Diagnosis Date    Anxiety      Bipolar affective disorder (HCC)      Chronic low back pain      Depression      Hyperlipidemia      Hypertension      Irritable bowel syndrome      Osteoarthritis      Schizophrenia, schizo-affective type, depressed ([address]       has had ECT tx in past            Past Surgical History         Past Surgical History:   Procedure Laterality Date    ABDOMEN SURGERY   1988     exploratory lap, ovarian tubal abscess rupture    APPENDECTOMY   1995    CARDIAC CATHETERIZATION   2013     NORMAL    CHOLECYSTECTOMY   1995    HYSTERECTOMY, TOTAL ABDOMINAL   2003     1/2 of right ovary retained    SPINAL CORD STIMULATOR SURGERY   01/30/2020     [facility].     TONSILLECTOMY   1974    WISDOM TOOTH EXTRACTION                Family History         Family History   Problem Relation Age of Onset    High Blood Pressure Mother      Emphysema Mother     [place] Arthritis Mother      Other Mother           cholaginis cholitis    Heart Attack Father      Stroke Father      Diabetes Father      High Blood Pressure Father      High Cholesterol Father      Emphysema Father      Diabetes Brother      High Blood Pressure Brother      Diabetes Maternal Grandmother      High Blood Pressure Maternal Grandmother      Heart Disease Maternal Grandfather              Allergies:  See list     Current Facility-Administered Medications          Current Outpatient Medications   Medication Sig Dispense Refill    sucralfate (CARAFATE) 1 GM tablet TAKE ONE TABLET BY MOUTH FOUR TIMES A DAY FOR FOURTEEN DAYS 60 tablet 5    atenolol (TENORMIN) 100 MG tablet TAKE 1/2 TABLET BY MOUTH DAILY (Patient taking differently: 100 mg daily ) 90 tablet 0    pantoprazole (PROTONIX) 40 MG tablet Take 1 tablet by mouth every morning (before breakfast) 90 tablet 0    atorvastatin (LIPITOR) 80 MG tablet Take 1 tablet by mouth daily (Patient taking differently: Take 40 mg by mouth nightly ) 30 tablet on file       Emotionally abused: No       Physically abused: No       Forced sexual activity: No   Other Topics Concern    Not on file   Social History Narrative    Not on file            /70 (Site: Right Upper Arm, Position: Sitting, Cuff Size: Large Adult)   Pulse 78   Temp 98.1 °F (36.7 °C) (Infrared)   Ht 5' 7\" (1.702 m)   Wt 217 lb 4.8 oz (98.6 kg)   BMI 34.03 kg/m²       Physical Exam  Vitals signs and nursing note reviewed. Constitutional:       Appearance: She is well-developed. HENT:      Head: Normocephalic and atraumatic. Eyes:      General: No scleral icterus. Conjunctiva/sclera: Conjunctivae normal.      Pupils: Pupils are equal, round, and reactive to light. Neck:      Musculoskeletal: Normal range of motion and neck supple. Vascular: No JVD. Trachea: No tracheal deviation. Cardiovascular:      Rate and Rhythm: Normal rate and regular rhythm. Pulmonary:      Effort: Pulmonary effort is normal. No respiratory distress. Chest:      Chest wall: No tenderness. Abdominal:      General: There is no distension. Palpations: Abdomen is soft. There is no mass. Tenderness: There is no abdominal tenderness. There is no guarding or rebound. Musculoskeletal: Normal range of motion. Lymphadenopathy:      Cervical: No cervical adenopathy. Skin:     General: Skin is warm and dry. Findings: No erythema or rash. Neurological:      Mental Status: She is alert and oriented to person, place, and time. Cranial Nerves: No cranial nerve deficit. Psychiatric:         Behavior: Behavior normal.         Thought Content:  Thought content normal.         Judgment: Judgment normal.            IMAGING/LABS     CT ABDOMEN PELVIS W IV CONTRAST Additional Contrast? Oral  Status: Final result   Order Providers     Authorizing Encounter Billing   Reese Velazco CNP [facility] CAT SCAN ROOM Jonathan Bingham MD           Signed by     Signed Date/Time Phone Pager [phone] [facility], [address] [place] 1/19/2021  3:47 [phone]     Reading Providers     Read Date Phone Pager   [phone] [facility], [address] [facility] Jan 19, 2021  3:47 [phone]     All Reviewers List     ANÍBAL Pederson CNP on 1/19/2021 16:53   CT ABDOMEN PELVIS W IV CONTRAST Additional Contrast? Oral: Result Notes   Edwin [place], [place]   1/21/2021  8:21 AM EST      Pt is informed of results and instructions given to pt. Shireenemmanuel [place], [place]   1/19/2021  4:58 PM EST      Message left for pt to call office    ANÍBAL Velásquez CNP   1/19/2021  4:53 PM EST      Bladder shows possible UTI, rest of CT is normal  Start keflex 500mg po tid x10 days  Push fluids, rest, f/u in 1 week.           Routing History     Priority Sent On From To Message Type    1/19/2021  4:53 PM ANÍBAL Pederson CNP P Carmen Garcia Clinical Staff Result Notes    1/19/2021  3:50 PM Ricardo, [facility]   Radiation Dose Estimates     No radiation information found for this patient   Narrative   EXAMINATION:   CT OF THE ABDOMEN AND PELVIS WITH CONTRAST 1/19/2021 3:28 pm       TECHNIQUE:   CT of the abdomen and pelvis was performed with the administration of   intravenous contrast. Multiplanar reformatted images are provided for review.    Dose modulation, iterative reconstruction, and/or weight based adjustment of   the mA/kV was utilized to reduce the radiation dose to as low as reasonably   achievable.       COMPARISON:   08/18/2016       HISTORY:   ORDERING SYSTEM PROVIDED HISTORY: Epigastric pain   TECHNOLOGIST PROVIDED HISTORY:           FINDINGS:   Lower Chest: Lung bases are clear without pulmonary nodules, masses,   effusions, or foci of airspace disease.  The base of the heart is normal in   size without pericardial fluid collection.       Organs: Status post cholecystectomy.  Hepatomegaly.  No focal hepatic mass   lesions.  Pancreas and spleen are unremarkable.       GI/Bowel: Bowel is without evidence for obstruction or inflammation.  No free   intraperitoneal air or fluid noted.  Small bowel loops are normal in caliber. No definite hiatal hernia.       Pelvis:  No evidence for free fluid.       Peritoneum/Retroperitoneum: The adrenal glands are normal in size and   configuration bilaterally.  Kidneys perfuse and excrete in a symmetric   fashion and ureters are not obstructed.  Generalized bladder wall thickening   noted concerning for cystitis. .       Bones/Soft Tissues: Nonspecific sclerosis of L1.  There was a hemangioma   identified within the L1 vertebral body on previous study.  Advanced   degenerative disease at L5-S1.       Vasculature:  The abdominal aorta is normal in caliber.  No discrete and   aneurysm or dissection.  No lymphadenopathy within the abdomen or pelvis.           Impression   No evidence for acute intra-abdominal or intrapelvic pathology.  No bowel   obstruction or inflammation.  No free intraperitoneal air or fluid.  No   evidence for urinary obstruction.       Generalized bladder wall thickening suggestive of cystitis.          1/19/2021  2:24 PM - Ricardo, [facility]pn Incoming Lab Results From [facility] [employer]     Component Value Ref Range & Units Status Collected Lab   WBC 10.3  3.5 - 11.3 k/uL Final 01/19/2021  2:12 PM [facility]   RBC 4.82  3.95 - 5.11 m/uL Final 01/19/2021  2:12 PM [facility]   Hemoglobin 14.6  11.9 - 15.1 g/dL Final 01/19/2021  2:12 PM [facility]   Hematocrit 44.0  36.3 - 47.1 % Final 01/19/2021  2:12 PM [facility]   MCV 91.3  82.6 - 102.9 fL Final 01/19/2021  2:12 PM [facility]   MCH 30.3  25.2 - 33.5 pg Final 01/19/2021  2:12 PM [facility]   MCHC 33.2  28.4 - 34.8 g/dL Final 01/19/2021  2:12 PM [facility]   RDW 12.2  11.8 - 14.4 % Final 01/19/2021  2:12 PM [facility]   Platelets 489  820 - 453 k/uL Final 01/19/2021  2:12 PM [facility]   MPV 10.7  8.1 - 13.5 fL Final 01/19/2021  2:12 PM [facility]   NRBC Automated 0.0  0.0 per 100 WBC Final 01/19/2021  2:12 PM [facility]            ASSESSMENT       Diagnosis Orders   1. Epigastric pain      2. Diarrhea, unspecified type      3. BMI 33.0-33.9,adult      4. Family history of colitis      5. Tobacco abuse            PLAN     Discussed at length with Ms. Roajs the nature of her various GI complaints and recent imaging. We will proceed with diagnostic endoscopy. Risks, benefits, alternatives thoroughly reviewed and accepted by Ms. Rojas including GI bleeding, perforation, missed lesions, COVID-19 exposure/infection, etc.  Discussed importance of healthy, bland diet, continuation of proton pump inhibition, and complete tobacco cessation. It would be beneficial to obtain more detailed medical records regarding her mother's reported total colectomy at the [facility] clinic.

## 2021-03-02 NOTE — ANESTHESIA PRE PROCEDURE
Department of Anesthesiology  Preprocedure Note       Name:  Avelina Lucia   Age:  48 y.o.  :  1970                                          MRN:  [id number]         Date:  3/2/2021      Surgeon: Genevieve Marcus):  Pradeep Arshad MD    Procedure: Procedure(s):  COLORECTAL CANCER SCREENING, NOT HIGH RISK  EGD ESOPHAGOGASTRODUODENOSCOPY    Medications prior to admission:   Prior to Admission medications    Medication Sig Start Date End Date Taking? Authorizing Provider   atenolol (TENORMIN) 100 MG tablet TAKE 1/2 TABLET BY MOUTH DAILY  Patient taking differently: 100 mg daily  21  Yes ANÍBAL Urena CNP   pantoprazole (PROTONIX) 40 MG tablet Take 1 tablet by mouth every morning (before breakfast) 21  Yes ANÍBAL Urena CNP   HYDROcodone-acetaminophen (NORCO)  MG per tablet Take 1 tablet by mouth 3 times daily. 20  Yes Historical Provider, MD   gabapentin (NEURONTIN) 300 MG capsule Take 300 mg by mouth 3 times daily.     Yes Historical Provider, MD   Probiotic Acidophilus [facility]) TABS Take 1 tablet by mouth 2 times daily 2/7/21 3/9/21  ANÍBAL Hairston CNP   sucralfate (CARAFATE) 1 GM tablet TAKE ONE TABLET BY MOUTH FOUR TIMES A DAY FOR FOURTEEN DAYS 21   ANÍBAL Urena CNP   atorvastatin (LIPITOR) 80 MG tablet Take 1 tablet by mouth daily  Patient taking differently: Take 40 mg by mouth nightly  20   ANÍBAL Urena CNP   EPINEPHrine (EPIPEN 2-SHLOMO) 0.3 MG/0.3ML SOAJ injection Use as directed for allergic reaction 20   ANÍBAL Urena CNP   sertraline (ZOLOFT) 100 MG tablet Take 100 mg by mouth daily    Historical Provider, MD   iloperidone (FANAPT) 6 MG tablet Take 3 mg by mouth 2 times daily     Historical Provider, MD       Current medications:    Current Facility-Administered Medications   Medication Dose Route Frequency Provider Last Rate Last Admin    lactated ringers infusion   Intravenous Continuous Senait Silverio Jose Lima MD        sodium chloride flush 0.9 % injection 10 mL  10 mL Intravenous 2 times per day Karen Leon MD        sodium chloride flush 0.9 % injection 10 mL  10 mL Intravenous PRN Karen Leon MD        lactated ringers infusion   Intravenous Continuous Karen Leon  mL/hr at 03/02/21 0922 New Bag at 03/02/21 0922    sodium chloride flush 0.9 % injection 10 mL  10 mL Intravenous 2 times per day Karen Leon MD        sodium chloride flush 0.9 % injection 10 mL  10 mL Intravenous PRN Karen Leon MD           Allergies: Allergies   Allergen Reactions    Bee Venom Shortness Of Breath and Swelling    Sulfa Antibiotics Anaphylaxis and Rash    Ciprofloxacin Hcl Other (See Comments)     Abdominal cramping         Problem List:    Patient Active Problem List   Diagnosis Code    Eczema L30.9    Erythromelalgia ([facility] [address] I73.81    Epigastric pain R10.13       Past Medical History:        Diagnosis Date    Anxiety     Bipolar affective disorder ([facility] [address]     Chronic low back pain     Depression     Hyperlipidemia     Hypertension     Irritable bowel syndrome     Osteoarthritis     Schizophrenia, schizo-affective type, depressed ([facility] [address] [facility]     has had ECT tx in past       Past Surgical History:        Procedure Laterality Date    ABDOMEN SURGERY  1988    exploratory lap, ovarian tubal abscess rupture    APPENDECTOMY  1995    CARDIAC CATHETERIZATION  2013    NORMAL    CHOLECYSTECTOMY  1995    HYSTERECTOMY, TOTAL ABDOMINAL  2003    1/2 of right ovary retained   [facility]  01/30/2020    [facility].     TONSILLECTOMY  1974    WISDOM TOOTH EXTRACTION         Social History:    Social History     Tobacco Use    Smoking status: Current Every Day Smoker     Packs/day: 1.00     Years: 19.00     Pack years: 19.00    Smokeless tobacco: Never Used   Substance Use Topics    Alcohol use: Not Currently     Frequency: Monthly or less     Drinks per session: 1 or 2     Binge frequency: Never                                Ready to quit: Not Answered  Counseling given: Not Answered      Vital Signs (Current):   Vitals:    03/02/21 0846 03/02/21 0855   BP:  132/88   Pulse:  86   Resp:  12   Temp:  37.1 °C (98.7 °F)   TempSrc:  Temporal   SpO2:  96%   Weight: 211 lb 3.2 oz (95.8 kg)    Height:  5' 7\" (1.702 m)                                              BP Readings from Last 3 Encounters:   03/02/21 132/88   02/09/21 114/74   02/07/21 118/72       NPO Status: Time of last liquid consumption: 0530                        Time of last solid consumption: 1930                        Date of last liquid consumption: 03/02/21                        Date of last solid food consumption: 02/28/21    BMI:   Wt Readings from Last 3 Encounters:   03/02/21 211 lb 3.2 oz (95.8 kg)   02/09/21 214 lb 8 oz (97.3 kg)   02/07/21 220 lb (99.8 kg)     Body mass index is 33.08 kg/m². CBC:   Lab Results   Component Value Date    WBC 10.3 01/19/2021    RBC 4.82 01/19/2021    RBC 4.90 01/06/2020    HGB 14.6 01/19/2021    HCT 44.0 01/19/2021    MCV 91.3 01/19/2021    RDW 12.2 01/19/2021     01/19/2021       CMP:   Lab Results   Component Value Date     01/19/2021    K 4.5 01/19/2021     01/19/2021    CO2 30 01/19/2021    BUN 14 01/19/2021    CREATININE 0.57 01/19/2021    GFRAA >60 01/19/2021    LABGLOM >60 01/19/2021    GLUCOSE 117 01/19/2021    GLUCOSE 123 08/11/2020    PROT 7.3 01/19/2021    CALCIUM 10.1 01/19/2021    BILITOT 0.37 01/19/2021    ALKPHOS 81 01/19/2021    AST 20 01/19/2021    ALT 24 01/19/2021       POC Tests: No results for input(s): POCGLU, POCNA, POCK, POCCL, POCBUN, POCHEMO, POCHCT in the last 72 hours.     Coags: No results found for: PROTIME, INR, APTT    HCG (If Applicable): No results found for: PREGTESTUR, PREGSERUM, HCG, HCGQUANT     ABGs: No results found for: PHART, PO2ART, VRW9IKR, KWS7WOU, BEART, M5HKLNPO     Type & Screen (If Applicable):  No results found for: LABABO, LABRH    Drug/Infectious Status (If Applicable):  No results found for: HIV, HEPCAB    COVID-19 Screening (If Applicable):   Lab Results   Component Value Date    COVID19 Not Detected 02/23/2021         Anesthesia Evaluation  Patient summary reviewed and Nursing notes reviewed no history of anesthetic complications:   Airway: Mallampati: I  TM distance: >3 FB   Neck ROM: full  Mouth opening: > = 3 FB Dental:          Pulmonary:normal exam    (+) wheezes (RUL wheezing),  current smoker          Patient smoked on day of surgery. Cardiovascular:  Exercise tolerance: good (>4 METS),   (+) hypertension:,         Rhythm: regular  Rate: normal           Beta Blocker:  Dose within 24 Hrs         Neuro/Psych:   (+) neuromuscular disease (BLE tingling and burning chronically.):, psychiatric history: stable with treatment            GI/Hepatic/Renal:   (+) GERD: well controlled, bowel prep,           Endo/Other:    (+) : arthritis:., .                 Abdominal:           Vascular: negative vascular ROS. Anesthesia Plan      general     ASA 3       Induction: intravenous. Anesthetic plan and risks discussed with patient. Plan discussed with CRNA.                   ANÍBAL Hester - BEATRIZ   3/2/2021

## 2021-03-02 NOTE — OP NOTE
[address], [place] [address]                                OPERATIVE REPORT    PATIENT NAME: Sonya Srinivasan                      :        1970  MED REC NO:   [id number]                              ROOM:  ACCOUNT NO:   [de-identified]                           ADMIT DATE: 2021  PROVIDER:     Rica Chi    DATE OF PROCEDURE:  2021    ATTENDING SURGEON:  Dr. Rica Chi. PCP:  Merle Swain, CNP. PREOPERATIVE DIAGNOSES:  1.  Epigastric pain with reflux symptoms. 2.  Loose stools. 3.  Family history of colitis (mother). POSTOPERATIVE DIAGNOSES:  1. Diffuse superficial erosive gastritis with bile reflux. 2.  Diminutive sessile sigmoid polyp (approximately 35 cm from anal  verge). OPERATION PERFORMED:  1. Esophagogastroduodenoscopy. 2.  Prepyloric antral biopsies. 3.  Colonoscopy, anus to cecum. 4.  Sessile sigmoid polypectomy (approximately 35 cm from anal verge). ANESTHESIA:  MAC.    ESTIMATED BLOOD LOSS:  Less than 20 mL. INDICATIONS:  The patient is a 51-year-old white female with epigastric  pain, reflux symptoms, nausea, and diarrhea present for 2 months. Cholecystectomy age _____, appendectomy age 25, prior surgery for  tubo-ovarian abscess, recently treated for UTI, BMI 29, history of  irritable bowel, anxiety, bipolar disorder, and schizophrenia. Colonoscopy more than 10 years ago was normal as the patient recalls. Mother reportedly treated for collagenous colitis at the [facility] at the age of 48 with total abdominal colectomy. The patient  smokes one pack per day. At this time, diagnostic endoscopy is  indicated.     OPERATIVE PROCEDURE:  After obtaining informed consent with discussion  of risks, benefits, and alternatives including a risk of GI bleeding,  perforation, missed lesions, COVID-19 exposure/infection, etc., the  patient was taken to the endoscopy suite and placed in the left lateral  recumbent position. Following adequate IV sedation, an endoscope was  passed over the tongue into the posterior pharynx. Vocal folds were  visualized and appeared normal.  Scope was directed into the esophagus  and onto the GE junction. Upper, mid, and lower esophagus all appeared  normal.  There were no rings, no webs, no varices, no strictures. The  stomach was entered, had normal distensibility. On retroflexion, the  fundus and cardia appeared otherwise normal.  The body and prepyloric  antrum, however, had diffuse superficial erosive gastritis without  ulceration. A significant amount of bile was refluxed into the gastric  lumen. Prepyloric antral biopsies were obtained. Pylorus was patent. The duodenum was entered. First, second, and third portions of the  duodenum appeared normal.  The stomach was decompressed by suction as  the scope was removed. A digital rectal exam was performed. Sphincter  tone was normal.  Perianal skin tags were present. A colonoscope was  passed transanally into the rectum and advanced with gentle insufflation  throughout the entirety of the colon to the cecum. Cecal position was  confirmed by clear visualization of the ileocecal valve, the appendiceal  orifice, and transduction manual pressure in the right lower quadrant to  the cecum. The bowel prep was excellent. All colonic mucosa was  clearly visible. The cecum, ascending colon, and hepatic flexure were  normal.  Transverse colon and splenic flexure were also normal.  The  descending colon and sigmoid were quite redundant. Approximately 35 cm  from the anal verge, a small sessile sigmoid polyp was removed by hot  snare polypectomy. Upper, middle, and lower portions of the rectum were  normal.  On retroflexion, there were moderate grade 2 internal  hemorrhoids. The colon was decompressed by suction as the scope was  removed.   The patient tolerated the procedure well and was

## 2021-03-02 NOTE — PROGRESS NOTES
Patient verbalizes readiness for discharge. All criteria met. Discharge Criteria    Inpatients must meet Criteria 1 through 7. All other patients are either YES or N/A. If a NO is chosen then Anesthesia or Surgeon must be notified. 1.  Minimum 30 minutes after last dose of sedative medication, minimum 120 minutes after last dose of reversal agent. Yes      2. Systolic BP stable within 20 mmHg for 30 minutes & systolic BP between 90 & 729 or within 10 mmHg of baseline. Yes      3. Pulse between 60 and 100 or within 10 bpm of baseline. Yes      4. Spontaneous respiratory rate >/= 10 per minute. Yes      5. SaO2 >/= 95 or  >/= baseline. Yes      6. Able to cough and swallow or return to baseline function. Yes      7. Alert and oriented or return to baseline mental status. Yes      8. Demonstrates controlled, coordinated movements, ambulates with steady gait, or return to baseline activity function. Yes      9. Minimal or no pain or nausea, or at a level tolerable and acceptable to patient. Yes      10. Takes and retains oral fluids as allowed. Yes      11. Procedural / perioperative site stable. Minimal or no bleeding. Yes          12. If GI endoscopy procedure, minimal or no abdominal distention or passing flatus. Yes      13. Written discharge instructions and emergency telephone number provided. Yes      14. Accompanied by a responsible adult.     Yes

## 2021-03-02 NOTE — BRIEF OP NOTE
Brief Postoperative Note      Patient: Brian Schuler  YOB: 1970  MRN: [id number]    Date of Procedure: 3/2/2021    Pre-Op Diagnosis:      1. Epigastric pain with GERD symptoms     2. Loose stools     3. Family history colitis (mother)    Post-Op Diagnosis:      1. Diffuse superficial erosive gastritis with bile reflux     2. Diminutive sessile sigmoid polyp  (~ 35 cm from anal verge)       Operation:     1. EGD     2. Antral biopsies     3. Colonoscopy anus to cecum     4. Sessile sigmoid polypectomy (~ 35 cm from anal verge)    Surgeon(s):  Ryan Suarez MD    Assistant:  * No surgical staff found *    Anesthesia: Monitor Anesthesia Care    Estimated Blood Loss (mL): less than 59PD     Complications: None    Specimens:   ID Type Source Tests Collected by Time Destination   1 :  Tissue Stomach H. PYLORI DETECTION Ryan Suarez MD 3/2/2021 1014    A :  Tissue Stomach SURGICAL PATHOLOGY Ryan Suarez MD 3/2/2021 1006    B :  Tissue Sigmoid Colon SURGICAL PATHOLOGY Ryan Suarez MD 3/2/2021 1039      Findings:  As above.     Dictated #  [id number]    Electronically signed by Ryan Suarez MD on 3/2/2021 at 10:43 AM

## 2021-03-03 LAB
DIRECT EXAM: NEGATIVE
Lab: NORMAL
SPECIMEN DESCRIPTION: NORMAL
SURGICAL PATHOLOGY REPORT: NORMAL

## 2021-03-19 ENCOUNTER — OFFICE VISIT (OUTPATIENT)
Dept: SURGERY | Age: 51
End: 2021-03-19
Payer: COMMERCIAL

## 2021-03-19 VITALS
HEART RATE: 62 BPM | DIASTOLIC BLOOD PRESSURE: 82 MMHG | BODY MASS INDEX: 34.35 KG/M2 | SYSTOLIC BLOOD PRESSURE: 131 MMHG | WEIGHT: 219.3 LBS | TEMPERATURE: 98.6 F

## 2021-03-19 DIAGNOSIS — K29.60 BILE REFLUX GASTRITIS: ICD-10-CM

## 2021-03-19 DIAGNOSIS — K63.5 HYPERPLASTIC POLYP OF SIGMOID COLON: ICD-10-CM

## 2021-03-19 DIAGNOSIS — Z72.0 TOBACCO ABUSE: ICD-10-CM

## 2021-03-19 DIAGNOSIS — R19.7 DIARRHEA, UNSPECIFIED TYPE: ICD-10-CM

## 2021-03-19 DIAGNOSIS — K29.30 CHRONIC SUPERFICIAL GASTRITIS WITHOUT BLEEDING: ICD-10-CM

## 2021-03-19 DIAGNOSIS — Z98.890 S/P COLONOSCOPY WITH POLYPECTOMY: Primary | ICD-10-CM

## 2021-03-19 DIAGNOSIS — Z83.79 FAMILY HISTORY OF COLITIS: ICD-10-CM

## 2021-03-19 PROCEDURE — G8484 FLU IMMUNIZE NO ADMIN: HCPCS | Performed by: SURGERY

## 2021-03-19 PROCEDURE — G8427 DOCREV CUR MEDS BY ELIG CLIN: HCPCS | Performed by: SURGERY

## 2021-03-19 PROCEDURE — 4004F PT TOBACCO SCREEN RCVD TLK: CPT | Performed by: SURGERY

## 2021-03-19 PROCEDURE — G8417 CALC BMI ABV UP PARAM F/U: HCPCS | Performed by: SURGERY

## 2021-03-19 PROCEDURE — 3017F COLORECTAL CA SCREEN DOC REV: CPT | Performed by: SURGERY

## 2021-03-19 PROCEDURE — 99212 OFFICE O/P EST SF 10 MIN: CPT | Performed by: SURGERY

## 2021-03-19 NOTE — LETTER
[facility] SURGERY Part of [place] [address]  Phone: [phone]  Fax: [phone]    Vivian Claudio MD        March 21, 2021     Antonia Valdez, [address] [facility] [address] [place] [address], [address]  Critical access hospital [address]    Patient: Negrito Garcia  MR Number: [id number]  YOB: 1970  Date of Visit: 3/19/2021    Dear. Antonia Valdez: Thank you for the request for consultation for Dimitris Leiva to me for the evaluation of epigastric pain, loose stools. Below are the relevant portions of my assessment and plan of care. ASSESSMENT     Diagnosis Orders   1. S/P colonoscopy with polypectomy     2. Hyperplastic polyp of sigmoid colon     3. Chronic superficial gastritis without bleeding     4. Bile reflux gastritis  cholestyramine (QUESTRAN) 4 g packet   5. Diarrhea, unspecified type     6. BMI 33.0-33.9,adult     7. Family history of colitis     8. Tobacco abuse         PLAN    Endoscopic findings and pathology reviewed with Ms. Rojas. Recommend neck screening colonoscopy in 4 to 5 years, age 55-48. Chronic superficial gastritis with bile reflux was noted on EGD. Prescription for Questran provided. Consider trial of Reglan. Discussed importance of a healthy, high-fiber low-fat diet, with fiber supplementation, etc.  Strongly encourage complete tobacco cessation. If you have questions, please do not hesitate to call me. I look forward to following Arvkatrina Ayoub along with you.     Sincerely,        Vivian Claudio MD

## 2021-03-21 RX ORDER — CHOLESTYRAMINE 4 G/9G
1 POWDER, FOR SUSPENSION ORAL 2 TIMES DAILY
Qty: 90 PACKET | Refills: 3 | Status: SHIPPED | OUTPATIENT
Start: 2021-03-21 | End: 2021-04-26 | Stop reason: DRUGHIGH

## 2021-03-21 ASSESSMENT — ENCOUNTER SYMPTOMS
VOMITING: 0
CHOKING: 0
ABDOMINAL PAIN: 0
BACK PAIN: 1
COUGH: 0
BLOOD IN STOOL: 0
SORE THROAT: 0
SHORTNESS OF BREATH: 0
DIARRHEA: 1
NAUSEA: 0
TROUBLE SWALLOWING: 0

## 2021-03-21 NOTE — PATIENT INSTRUCTIONS
Patient Education        Leonardo's Esophagus: Care Instructions  Your Care Instructions     The esophagus is the tube that connects the throat to the stomach. Food and liquids go through this tube. In Leonardo's esophagus, the cells that line the tube change. This is usually because of gastroesophageal reflux disease (GERD). GERD causes acid from your stomach to back up into the esophagus. When you have Leonardo's esophagus, you are slightly more likely to get cancer of the esophagus. So regular testing is important to watch for signs of this cancer. You can treat GERD to control your symptoms and feel better. Follow-up care is a key part of your treatment and safety. Be sure to make and go to all appointments, and call your doctor if you are having problems. It's also a good idea to know your test results and keep a list of the medicines you take. How can you care for yourself at home? · Take your medicines exactly as prescribed. Call your doctor if you think you are having a problem with your medicine. · If you take over-the-counter medicine, such as antacids or acid reducers, follow all instructions on the label. If you use these medicines often, talk with your doctor. Be careful when you take over-the-counter antacid medicines. Many of these medicines have aspirin in them. Read the label to make sure that you are not taking more than the recommended dose. Too much aspirin can be harmful. · Do not smoke or chew tobacco. Smoking can make GERD worse. If you need help quitting, talk to your doctor about stop-smoking programs and medicines. These can increase your chances of quitting for good. · Chocolate, mint, and alcohol can make GERD worse. They relax the valve between the esophagus and the stomach. · Spicy foods, foods that have a lot of acid (like tomatoes and oranges), and coffee can make GERD symptoms worse in some people.  If your symptoms are worse after you eat a certain food, you may want to stop eating that food to see if your symptoms get better. · Eat smaller meals, and more often. After eating, wait 2 to 3 hours before you lie down. · Raise the head of your bed 6 to 8 inches by putting blocks under the frame or a foam wedge under the head of the mattress. · Do not wear tight clothing around your midsection. · If you are overweight, lose weight. Even losing 5 to 10 pounds can help. When should you call for help? Call your doctor now or seek immediate medical care if:    · You have new or worse belly pain.     · You are vomiting. Watch closely for changes in your health, and be sure to contact your doctor if:    · You have any pain or difficulty swallowing.     · You are losing weight.     · You have new or worse symptoms, such as heartburn.     · You do not get better as expected. Where can you learn more? Go to https://ApplyMap.Beanup. org and sign in to your [employer] account. Enter L146 in the ActionIQ box to learn more about \"Leonardo's Esophagus: Care Instructions. \"     If you do not have an account, please click on the \"Sign Up Now\" link. Current as of: April 15, 2020               Content Version: 12.8  © 2006-2021 Healthwise, Incorporated. Care instructions adapted under license by [facility] ([facility]). If you have questions about a medical condition or this instruction, always ask your healthcare professional. [place] [address] any warranty or liability for your use of this information.

## 2021-03-21 NOTE — PROGRESS NOTES
Teresa Hancock MD  General Surgery, Endoscopy  Chief Medical Officer    [facility] Marylen Moors  [address] [address]  Dept: [phone]  Fax: [address]acia  Chief Complaint   Patient presents with    Follow Up After Procedure     F/U EGD/colonoscopy 3/2 d/t epigastric pain. Pt reports carafate helping, reports still having \"some\" epiigastric pain. C/o bloating after eating       HPI    Mr Kirill Pemberton returns for follow-up after endoscopy. DATE OF PROCEDURE:  03/02/2021     ATTENDING SURGEON:  Dr. Rica Chi.     PCP:  Merle Swain CNP.     PREOPERATIVE DIAGNOSES:  1.  Epigastric pain with reflux symptoms. 2.  Loose stools. 3.  Family history of colitis (mother).     POSTOPERATIVE DIAGNOSES:  1. Diffuse superficial erosive gastritis with bile reflux. 2.  Diminutive sessile sigmoid polyp (approximately 35 cm from anal verge).     OPERATION PERFORMED:  1. Esophagogastroduodenoscopy. 2.  Prepyloric antral biopsies. 3.  Colonoscopy, anus to cecum. 4.  Sessile sigmoid polypectomy (approximately 35 cm from anal verge)    She is doing reasonably well. Abdominal complaints have improved since beginning a probiotic. Review of Systems   Constitutional: Negative for activity change, appetite change, chills, fever and unexpected weight change. HENT: Negative for nosebleeds, sneezing, sore throat and trouble swallowing. Eyes: Negative for visual disturbance. Respiratory: Negative for cough, choking and shortness of breath. Cardiovascular: Negative for chest pain, palpitations and leg swelling. Gastrointestinal: Positive for diarrhea. Negative for abdominal pain, blood in stool, nausea and vomiting. Genitourinary: Negative for dysuria, flank pain and hematuria. Musculoskeletal: Positive for arthralgias and back pain. Negative for gait problem and myalgias. Allergic/Immunologic: Negative for immunocompromised state.    Neurological: Negative for dizziness, seizures, syncope, weakness and headaches. Hematological: Does not bruise/bleed easily. Psychiatric/Behavioral: Positive for dysphoric mood. Negative for confusion and sleep disturbance. The patient is nervous/anxious. Past Medical History:   Diagnosis Date    Anxiety     Bipolar affective disorder ([facility] [address]     Chronic low back pain     Depression     Hyperlipidemia     Hypertension     Irritable bowel syndrome     Osteoarthritis     Schizophrenia, schizo-affective type, depressed ([address]     has had ECT tx in past       Past Surgical History:   Procedure Laterality Date    ABDOMEN SURGERY  1988    exploratory lap, ovarian tubal abscess rupture    [facility]  2013    [address]    COLONOSCOPY  03/02/2021    COLONOSCOPY N/A 3/2/2021    COLONOSCOPY POLYPECTOMY REMOVAL SNARE/STOMA performed by Colonel Joe MD at [address] [place] [address]  2003 1/2 of right ovary retained   [address]  01/30/2020    [facility].    [address]    UPPER GASTROINTESTINAL ENDOSCOPY  03/02/2021    UPPER GASTROINTESTINAL ENDOSCOPY N/A 3/2/2021    EGD BIOPSY performed by Colonel Joe MD at [facility]         Family History   Problem Relation Age of Onset    High Blood Pressure Mother     Emphysema Mother    [facility] Arthritis Mother     Other Mother         cholaginis cholitis    Heart Attack Father     Stroke Father     Diabetes Father     High Blood Pressure Father     High Cholesterol Father     Emphysema Father     Diabetes Brother     High Blood Pressure Brother     Diabetes Maternal Grandmother     High Blood Pressure Maternal Grandmother     Heart Disease Maternal Grandfather        Allergies:  See list    Current Outpatient Medications   Medication Sig Dispense Refill    atorvastatin (LIPITOR) 80 MG tablet Take 1 tablet by mouth daily 90 tablet 0    sucralfate (CARAFATE) 1 GM tablet TAKE ONE TABLET BY MOUTH FOUR TIMES A DAY FOR FOURTEEN DAYS 60 tablet 5    atenolol (TENORMIN) 100 MG tablet TAKE 1/2 TABLET BY MOUTH DAILY (Patient taking differently: 100 mg daily ) 90 tablet 0    pantoprazole (PROTONIX) 40 MG tablet Take 1 tablet by mouth every morning (before breakfast) 90 tablet 0    sertraline (ZOLOFT) 100 MG tablet Take 100 mg by mouth daily      gabapentin (NEURONTIN) 300 MG capsule Take 300 mg by mouth 3 times daily.  iloperidone (FANAPT) 6 MG tablet Take 3 mg by mouth 2 times daily       HYDROcodone-acetaminophen (NORCO)  MG per tablet Take 1 tablet by mouth 3 times daily.  EPINEPHrine (EPIPEN 2-SHLOMO) 0.3 MG/0.3ML SOAJ injection Use as directed for allergic reaction 1 each 1     No current facility-administered medications for this visit. Social History     Socioeconomic History    Marital status:      Spouse name: Not on file    Number of children: Not on file    Years of education: Not on file    Highest education level: Not on file   Occupational History    Not on file   Social Needs    Financial resource strain: Not hard at all    Food insecurity     Worry: Never true     Inability: Never true   [employer] needs     Medical: No     Non-medical: No   Tobacco Use    Smoking status: Current Every Day Smoker     Packs/day: 1.00     Years: 19.00     Pack years: 19.00    Smokeless tobacco: Never Used   Substance and Sexual Activity    Alcohol use: Not Currently     Frequency: Monthly or less     Drinks per session: 1 or 2     Binge frequency: Never    Drug use: No    Sexual activity: Yes     Partners: Female   Lifestyle    Physical activity     Days per week: 4 days     Minutes per session: 10 min    Stress:  Only a little   Relationships    Social connections     Talks on phone: Not on file     Gets together: Not on file     Attends Restorationism service: Not on file     Active member of club or organization: Not on file     Attends meetings of clubs or organizations: Not on file     Relationship status:     Intimate partner violence     Fear of current or ex partner: Not on file     Emotionally abused: No     Physically abused: No     Forced sexual activity: No   Other Topics Concern    Not on file   Social History Narrative    Not on file       /82   Pulse 62   Temp 98.6 °F (37 °C) (Infrared)   Wt 219 lb 4.8 oz (99.5 kg)   BMI 34.35 kg/m²      Physical Exam  Vitals signs and nursing note reviewed. Constitutional:       General: She is not in acute distress. Appearance: She is well-developed. HENT:      Head: Normocephalic and atraumatic. Eyes:      General: No scleral icterus. Conjunctiva/sclera: Conjunctivae normal.      Pupils: Pupils are equal, round, and reactive to light. Neck:      Trachea: No tracheal deviation. Cardiovascular:      Rate and Rhythm: Normal rate. Pulmonary:      Effort: Pulmonary effort is normal. No respiratory distress. Skin:     General: Skin is warm and dry. Neurological:      Mental Status: She is alert and oriented to person, place, and time. Psychiatric:         Behavior: Behavior normal.         Thought Content: Thought content normal.         Judgment: Judgment normal.         IMAGING/LABS    3/3/2021  3:49 PM - Ricardo, [facility] Incoming Lab Results From Etable    Component Collected Lab   Surgical Pathology Report 03/02/2021  3:[address]  [address]   -- Diagnosis --   1.  GASTRIC ANTRUM, BIOPSY:   -MINIMAL TO MILD CHRONIC GASTRITIS.   -H. PYLORI STAIN IS NEGATIVE.  CONTROL REACTS AS EXPECTED. 2.  SIGMOID COLON, BIOPSY:   -HYPERPLASTIC POLYP.        Pina Castillo M.D   **Electronically Signed Out**         edwardo/3/3/2021         Clinical Information   Pre-op Diagnosis:  SCREENING, EPIGASTRIC PAIN   Operative Findings:  ANTRAL BIOPSY FOR H. PYLORI (CLOTEST); ANTRAL   BIOPSIES; SIGMOID COLON POLYP   Operation Performed:  COLORECTAL CANCER SCREENING, NOT HIGH RISK, EGD   BIOPSY     Source of Specimen   1: ANTRAL BIOPSY FOR H. PYLORI   2: SIGMOID COLON POLYP     Gross Description   1.  \"PATRICK CASONRP, ANTRAL BIOPSIES\" Four tan-white tissue fragments   from 0.1 to 0.2 cm and are 0.4 x 0.4 x 0.2 cm in aggregate.  Entirely   1cs. 2.  \"PATRICK ALEXIS, SIGMOID COLON POLYP\" One tan-white tissue fragment,   0.4 x 0.4 x 0.2 cm.  Entirely 1cs.  mpb tm       Microscopic Description   1, 2.  Microscopic examination performed. SURGICAL PATHOLOGY CONSULTATION         Patient Name: Gabriel Burdick: [id number]   Path Number: [id number]     [address] [place] [address],  [address] [place], [address]   [phone]   Fax: [phone]    Testing Performed By    Marquita Godinez Name Director Address Valid Date Range   [facility]  [address] [address] 08/30/17 0801-Present   Lab and Collection    Surgical Pathology - 3/2/2021  Result Information        ASSESSMENT     Diagnosis Orders   1. S/P colonoscopy with polypectomy     2. Hyperplastic polyp of sigmoid colon     3. Chronic superficial gastritis without bleeding     4. Bile reflux gastritis  cholestyramine (QUESTRAN) 4 g packet   5. Diarrhea, unspecified type     6. BMI 33.0-33.9,adult     7. Family history of colitis     8. Tobacco abuse         PLAN    Endoscopic findings and pathology reviewed with Ms. Rojas. Recommend neck screening colonoscopy in 4 to 5 years, age 55-48. Chronic superficial gastritis with bile reflux was noted on EGD. Prescription for Questran provided. Consider trial of Reglan. Discussed importance of a healthy, high-fiber low-fat diet, with fiber supplementation, etc.  Strongly encourage complete tobacco cessation.      Koko Meza MD

## 2021-07-02 ENCOUNTER — HOSPITAL ENCOUNTER (OUTPATIENT)
Age: 51
Discharge: HOME | End: 2021-07-04
Payer: COMMERCIAL

## 2021-07-02 ENCOUNTER — HOSPITAL ENCOUNTER (OUTPATIENT)
Dept: GENERAL RADIOLOGY | Age: 51
Discharge: HOME | End: 2021-07-04
Payer: COMMERCIAL

## 2021-07-02 ENCOUNTER — HOSPITAL ENCOUNTER (OUTPATIENT)
Dept: VASCULAR LAB | Age: 51
Discharge: HOME | End: 2021-07-04
Payer: COMMERCIAL

## 2021-07-02 DIAGNOSIS — M79.622 LEFT UPPER ARM PAIN: ICD-10-CM

## 2021-07-02 PROCEDURE — 73060 X-RAY EXAM OF HUMERUS: CPT

## 2021-07-02 PROCEDURE — 93971 EXTREMITY STUDY: CPT

## 2021-07-02 PROCEDURE — 73080 X-RAY EXAM OF ELBOW: CPT

## 2021-07-09 ENCOUNTER — APPOINTMENT (OUTPATIENT)
Dept: GENERAL RADIOLOGY | Age: 51
End: 2021-07-09
Payer: COMMERCIAL

## 2021-07-09 ENCOUNTER — HOSPITAL ENCOUNTER (EMERGENCY)
Age: 51
Discharge: HOME | End: 2021-07-09
Payer: COMMERCIAL

## 2021-07-09 VITALS
WEIGHT: 215 LBS | TEMPERATURE: 97.8 F | OXYGEN SATURATION: 98 % | HEART RATE: 60 BPM | DIASTOLIC BLOOD PRESSURE: 78 MMHG | SYSTOLIC BLOOD PRESSURE: 145 MMHG | RESPIRATION RATE: 18 BRPM | BODY MASS INDEX: 33.67 KG/M2

## 2021-07-09 DIAGNOSIS — S99.922A INJURY OF LEFT FOOT, INITIAL ENCOUNTER: Primary | ICD-10-CM

## 2021-07-09 PROCEDURE — 73630 X-RAY EXAM OF FOOT: CPT

## 2021-07-09 PROCEDURE — 99283 EMERGENCY DEPT VISIT LOW MDM: CPT

## 2021-07-09 ASSESSMENT — PAIN DESCRIPTION - ORIENTATION
ORIENTATION: LEFT
ORIENTATION: LEFT

## 2021-07-09 ASSESSMENT — PAIN SCALES - GENERAL
PAINLEVEL_OUTOF10: 6
PAINLEVEL_OUTOF10: 5

## 2021-07-09 ASSESSMENT — PAIN DESCRIPTION - PAIN TYPE
TYPE: ACUTE PAIN
TYPE: ACUTE PAIN

## 2021-07-09 ASSESSMENT — PAIN DESCRIPTION - LOCATION: LOCATION: FOOT

## 2021-07-09 ASSESSMENT — PAIN - FUNCTIONAL ASSESSMENT: PAIN_FUNCTIONAL_ASSESSMENT: 0-10

## 2021-07-09 ASSESSMENT — PAIN DESCRIPTION - DESCRIPTORS
DESCRIPTORS: ACHING
DESCRIPTORS: ACHING;THROBBING

## 2021-07-09 ASSESSMENT — PAIN DESCRIPTION - FREQUENCY: FREQUENCY: CONTINUOUS

## 2021-07-09 NOTE — ED PROVIDER NOTES
[facility] ED  EMERGENCY DEPARTMENT ENCOUNTER      Pt Name: Rey Escalante  MRN: [id number]  [place] 1970  Date of evaluation: 7/9/2021  Provider: Laurent Jordan PA-C    CHIEF COMPLAINT     Chief Complaint   Patient presents with    Foot Injury     pt states she walked into a wroght iron chair at home yesterday, c/o left foot pain         HISTORY OF PRESENT ILLNESS   (Location/Symptom, Timing/Onset, Context/Setting,Quality, Duration, Modifying Factors, Severity)  Note limiting factors. Rey Escalante is a50 y.o. female who presents to the emergency department with complaints of left foot injury onset 1 day ago. Patient reports that she walked into an iron chair at home and is having pain. She denies any numbness or tingling sensation. Reports he still been able to ambulate. Thought that she might of broken something so she came to the ER. She otherwise has no other complaints at this time. Denies any ankle pain denies any calf pain. HPI    Nursing Notes werereviewed. REVIEW OF SYSTEMS    (2-9 systems for level 4, 10 or more for level 5)     Review of Systems   Constitutional: Negative for chills, diaphoresis and fever. HENT: Negative for congestion, ear pain, rhinorrhea and sore throat. Eyes: Negative for discharge, redness and visual disturbance. Respiratory: Negative for cough, chest tightness, shortness of breath and wheezing. Cardiovascular: Negative for chest pain and palpitations. Gastrointestinal: Negative for abdominal pain, blood in stool, constipation, diarrhea, nausea and vomiting. Endocrine: Negative for polydipsia, polyphagia and polyuria. Genitourinary: Negative for decreased urine volume, difficulty urinating, dysuria, frequency and hematuria. Musculoskeletal: Positive for arthralgias. Negative for back pain and myalgias. Skin: Negative for pallor and rash. Allergic/Immunologic: Negative for food allergies and immunocompromised state. Neurological: Negative for dizziness, syncope, weakness and light-headedness. Hematological: Negative for adenopathy. Does not bruise/bleed easily. Psychiatric/Behavioral: Negative for behavioral problems and suicidal ideas. The patient is not nervous/anxious. Except as noted above the remainder of the review of systems was reviewed and negative. PAST MEDICAL HISTORY     Past Medical History:   Diagnosis Date    Anxiety     Bipolar affective disorder ([facility] [address]     Chronic low back pain     Depression     Hyperlipidemia     Hypertension     Irritable bowel syndrome     Osteoarthritis     Schizophrenia, schizo-affective type, depressed ([address]     has had ECT tx in past         SURGICALHISTORY       Past Surgical History:   Procedure Laterality Date    ABDOMEN SURGERY  1988    exploratory lap, ovarian tubal abscess rupture    [facility]  2013    [address]    COLONOSCOPY  03/02/2021    COLONOSCOPY N/A 3/2/2021    COLONOSCOPY POLYPECTOMY REMOVAL SNARE/STOMA performed by Jackelin Mukherjee MD at [address] [place] [address]  2003 1/2 of right ovary retained   [facility]  01/30/2020    [facility] [address]    UPPER GASTROINTESTINAL ENDOSCOPY  03/02/2021    UPPER GASTROINTESTINAL ENDOSCOPY N/A 3/2/2021    EGD BIOPSY performed by Jackelin Mukherjee MD at [place] [address]       Previous Medications    ATENOLOL (TENORMIN) 100 MG TABLET    Take 1 tablet by mouth daily    ATORVASTATIN (LIPITOR) 80 MG TABLET    TAKE ONE TABLET BY MOUTH DAILY    CHOLESTYRAMINE (QUESTRAN) 4 G PACKET    Take 1 packet by mouth daily    EPINEPHRINE (EPIPEN 2-SHLOMO) 0.3 MG/0.3ML SOAJ INJECTION    Use as directed for allergic reaction    GABAPENTIN (NEURONTIN) 300 MG CAPSULE    Take 600 mg by mouth 3 times daily.      HYDROCODONE-ACETAMINOPHEN (NORCO)  MG PER TABLET    Take 1 tablet by mouth 3 times daily. ILOPERIDONE (FANAPT) 6 MG TABLET    Take 3 mg by mouth 2 times daily     PANTOPRAZOLE (PROTONIX) 40 MG TABLET    TAKE ONE TABLET BY MOUTH EVERY MORNING BEFORE BREAKFAST    SERTRALINE (ZOLOFT) 100 MG TABLET    Take 100 mg by mouth daily         ALLERGIES   Bee venom, Sulfa antibiotics, and Ciprofloxacin hcl    FAMILY HISTORY       Family History   Problem Relation Age of Onset    High Blood Pressure Mother     Emphysema Mother    [employer] Arthritis Mother     Other Mother         cholaginis cholitis    Heart Attack Father     Stroke Father     Diabetes Father     High Blood Pressure Father     High Cholesterol Father     Emphysema Father     Diabetes Brother     High Blood Pressure Brother     Diabetes Maternal Grandmother     High Blood Pressure Maternal Grandmother     Heart Disease Maternal Grandfather           SOCIAL HISTORY       Social History     Socioeconomic History    Marital status:      Spouse name: None    Number of children: None    Years of education: None    Highest education level: None   Occupational History    None   Tobacco Use    Smoking status: Current Every Day Smoker     Packs/day: 1.00     Years: 19.00     Pack years: 19.00    Smokeless tobacco: Never Used   Vaping Use    Vaping Use: Never used   Substance and Sexual Activity    Alcohol use: Not Currently    Drug use: No    Sexual activity: Yes     Partners: Female   Other Topics Concern    None   Social History Narrative    None     Social Determinants of Health     Financial Resource Strain: Low Risk     Difficulty of Paying Living Expenses: Not hard at all   Food Insecurity: No Food Insecurity    Worried About Running Out of Food in the Last Year: Never true    Ward of Food in the Last Year: Never true   Transportation Needs:     Lack of Transportation (Medical):      Lack of Transportation (Non-Medical):    Physical Activity:     Days of Exercise per Week:     Minutes of Exercise per Session:    Stress:     Feeling of Stress :    Social Connections:     Frequency of Communication with Friends and Family:     Frequency of Social Gatherings with Friends and Family:     Attends Shinto Services:     Active Member of Clubs or Organizations:     Attends Club or Organization Meetings:     Marital Status:    Intimate Partner Violence:     Fear of Current or Ex-Partner:     Emotionally Abused:     Physically Abused:     Sexually Abused:        SCREENINGS             PHYSICAL EXAM    (up to 7 for level 4, 8 or more for level 5)     ED Triage Vitals [07/09/21 1141]   BP Temp Temp Source Pulse Resp SpO2 Height Weight   (!) 145/78 97.8 °F (36.6 °C) Tympanic 60 16 99 % -- 215 lb (97.5 kg)       Physical Exam  Vitals and nursing note reviewed. Constitutional:       General: She is not in acute distress. Appearance: She is well-developed. She is not diaphoretic. HENT:      Head: Normocephalic and atraumatic. Right Ear: External ear normal.      Left Ear: External ear normal.   Eyes:      General: No scleral icterus. Right eye: No discharge. Left eye: No discharge. Conjunctiva/sclera: Conjunctivae normal.   Neck:      Trachea: No tracheal deviation. Cardiovascular:      Rate and Rhythm: Normal rate and regular rhythm. Pulmonary:      Effort: Pulmonary effort is normal. No respiratory distress. Breath sounds: No stridor. Musculoskeletal:         General: Tenderness present. No deformity. Normal range of motion. Cervical back: Normal range of motion and neck supple. Comments: There is tenderness to the dorsum of the left foot noted. Intact distal pulses sensation cap refills less than 2 seconds. No open wounds. Forage motion of ankle. No calf tenderness. No erythema no warmth. Skin:     General: Skin is warm and dry. Capillary Refill: Capillary refill takes less than 2 seconds.       Findings: No provider in the next 2 days. Patient verbalized understanding of this. We went over medications. Strict return warnings were given. Patient will return to the emergency room as needed with new or worsening complaints. Patient has a podiatrist will follow up. Procedures    FINAL IMPRESSION      1. Injury of left foot, initial encounter        DISPOSITION/PLAN   DISPOSITION Decision To Discharge 07/09/2021 12:35:37 PM      PATIENT REFERRED TO:  [facility] ED  [address] [facility]  [address] [place] [phone]    If symptoms worsen, As needed    Remington Diaz, [address] [facility] [address], [address]  [phone]    Schedule an appointment as soon as possible for a visit in 1 day        DISCHARGE MEDICATIONS:  New Prescriptions    No medications on file              Summation      Patient Course:      ED Medications administered this visit:  Medications - No data to display    New Prescriptions from this visit:    New Prescriptions    No medications on file       Follow-up:  [facility] ED  [address] [facility]  [phone]    If symptoms worsen, As needed    Remington Diaz, [address] [facility], [address]  [phone]    Schedule an appointment as soon as possible for a visit in 1 day          Final Impression:   1.  Injury of left foot, initial encounter               (Please note that portions of this note were completed with a voice recognition program.  Efforts were made to edit the dictations but occasionally words are mis-transcribed.)           Yaquelin Combs PA-C  07/11/21 1229

## 2021-07-11 ASSESSMENT — ENCOUNTER SYMPTOMS
BACK PAIN: 0
SHORTNESS OF BREATH: 0
BLOOD IN STOOL: 0
COUGH: 0
DIARRHEA: 0
WHEEZING: 0
VOMITING: 0
SORE THROAT: 0
CONSTIPATION: 0
RHINORRHEA: 0
ABDOMINAL PAIN: 0
EYE REDNESS: 0
EYE DISCHARGE: 0
CHEST TIGHTNESS: 0
NAUSEA: 0

## 2021-12-07 ENCOUNTER — OFFICE VISIT (OUTPATIENT)
Dept: SURGERY | Age: 51
End: 2021-12-07
Payer: COMMERCIAL

## 2021-12-07 VITALS
RESPIRATION RATE: 18 BRPM | DIASTOLIC BLOOD PRESSURE: 68 MMHG | HEART RATE: 62 BPM | WEIGHT: 223.6 LBS | TEMPERATURE: 97.5 F | BODY MASS INDEX: 35.09 KG/M2 | SYSTOLIC BLOOD PRESSURE: 133 MMHG | HEIGHT: 67 IN

## 2021-12-07 DIAGNOSIS — Z83.79 FAMILY HISTORY OF COLITIS: ICD-10-CM

## 2021-12-07 DIAGNOSIS — K21.9 GASTROESOPHAGEAL REFLUX DISEASE, UNSPECIFIED WHETHER ESOPHAGITIS PRESENT: ICD-10-CM

## 2021-12-07 DIAGNOSIS — Z87.19 HISTORY OF CHRONIC GASTRITIS: ICD-10-CM

## 2021-12-07 DIAGNOSIS — Z72.0 TOBACCO ABUSE: ICD-10-CM

## 2021-12-07 DIAGNOSIS — R10.13 EPIGASTRIC PAIN: Primary | ICD-10-CM

## 2021-12-07 PROCEDURE — G8482 FLU IMMUNIZE ORDER/ADMIN: HCPCS | Performed by: SURGERY

## 2021-12-07 PROCEDURE — 3017F COLORECTAL CA SCREEN DOC REV: CPT | Performed by: SURGERY

## 2021-12-07 PROCEDURE — G8417 CALC BMI ABV UP PARAM F/U: HCPCS | Performed by: SURGERY

## 2021-12-07 PROCEDURE — G8427 DOCREV CUR MEDS BY ELIG CLIN: HCPCS | Performed by: SURGERY

## 2021-12-07 PROCEDURE — 4004F PT TOBACCO SCREEN RCVD TLK: CPT | Performed by: SURGERY

## 2021-12-07 PROCEDURE — 99213 OFFICE O/P EST LOW 20 MIN: CPT | Performed by: SURGERY

## 2021-12-07 NOTE — PROGRESS NOTES
abscess rupture    APPENDECTOMY  1995    CARDIAC CATHETERIZATION  2013    [address]    COLONOSCOPY  03/02/2021    COLONOSCOPY N/A 3/2/2021    COLONOSCOPY POLYPECTOMY REMOVAL SNARE/STOMA performed by Darron Zavaleta MD at [address], TOTAL ABDOMINAL  2003    1/2 of right ovary retained   [address] [facility]  01/30/2020    [facility]    UPPER GASTROINTESTINAL ENDOSCOPY  03/02/2021    UPPER GASTROINTESTINAL ENDOSCOPY N/A 3/2/2021    EGD BIOPSY performed by Darron Zavaleta MD at [address] EXTRACTION         Family History   Problem Relation Age of Onset    High Blood Pressure Mother     Emphysema Mother    [employer] Arthritis Mother     Other Mother         cholaginis cholitis    Heart Attack Father     Stroke Father     Diabetes Father     High Blood Pressure Father     High Cholesterol Father     Emphysema Father     Diabetes Brother     High Blood Pressure Brother     Diabetes Maternal Grandmother     High Blood Pressure Maternal Grandmother     Heart Disease Maternal Grandfather        Allergies:  See list    Current Outpatient Medications   Medication Sig Dispense Refill    sucralfate (CARAFATE) 1 GM tablet Take 1 tablet by mouth 4 times daily 120 tablet 0    atenolol (TENORMIN) 100 MG tablet Take 1 tablet by mouth daily 90 tablet 1    atorvastatin (LIPITOR) 80 MG tablet TAKE ONE TABLET BY MOUTH DAILY 90 tablet 0    pantoprazole (PROTONIX) 40 MG tablet Take 1 tablet by mouth every morning (before breakfast) 90 tablet 1    nystatin (MYCOSTATIN) 460554 UNIT/GM powder Apply 3 times daily. 1 Bottle 2    EPINEPHrine (EPIPEN 2-SHLOMO) 0.3 MG/0.3ML SOAJ injection Use as directed for allergic reaction (Patient not taking: Reported on 11/1/2021) 1 each 1    HYDROcodone-acetaminophen (NORCO)  MG per tablet Take 1 tablet by mouth 3 times daily.       sertraline (ZOLOFT) 100 MG tablet Take 100 mg by mouth daily      gabapentin (NEURONTIN) 300 MG capsule Take 600 mg by mouth 3 times daily.  iloperidone (FANAPT) 6 MG tablet Take 3 mg by mouth 2 times daily        No current facility-administered medications for this visit. Social History     Socioeconomic History    Marital status:      Spouse name: Not on file    Number of children: Not on file    Years of education: Not on file    Highest education level: Not on file   Occupational History    Not on file   Tobacco Use    Smoking status: Current Every Day Smoker     Packs/day: 1.00     Years: 19.00     Pack years: 19.00    Smokeless tobacco: Never Used   Vaping Use    Vaping Use: Never used   Substance and Sexual Activity    Alcohol use: Not Currently    Drug use: No    Sexual activity: Yes     Partners: Female   Other Topics Concern    Not on file   Social History Narrative    Not on file     Social Determinants of Health     Financial Resource Strain: Low Risk     Difficulty of Paying Living Expenses: Not hard at all   Food Insecurity: No Food Insecurity    Worried About Running Out of Food in the Last Year: Never true    Ward of Food in the Last Year: Never true   Transportation Needs:     Lack of Transportation (Medical): Not on file    Lack of Transportation (Non-Medical):  Not on file   Physical Activity:     Days of Exercise per Week: Not on file    Minutes of Exercise per Session: Not on file   Stress:     Feeling of Stress : Not on file   Social Connections:     Frequency of Communication with Friends and Family: Not on file    Frequency of Social Gatherings with Friends and Family: Not on file    Attends Catholic Services: Not on file    Active Member of Clubs or Organizations: Not on file    Attends Club or Organization Meetings: Not on file    Marital Status: Not on file   Intimate Partner Violence:     Fear of Current or Ex-Partner: Not on file    Emotionally Abused: Not on file    Physically Abused: Not on file    Sexually Abused: Not on file   Housing Stability:     Unable to Pay for Housing in the Last Year: Not on file    Number of Places Lived in the Last Year: Not on file    Unstable Housing in the Last Year: Not on file       LMP  (LMP Unknown)      Physical Exam  Vitals and nursing note reviewed. Constitutional:       Appearance: She is well-developed. HENT:      Head: Normocephalic and atraumatic. Eyes:      General: No scleral icterus. Conjunctiva/sclera: Conjunctivae normal.      Pupils: Pupils are equal, round, and reactive to light. Neck:      Vascular: No JVD. Trachea: No tracheal deviation. Cardiovascular:      Rate and Rhythm: Normal rate and regular rhythm. Pulmonary:      Effort: Pulmonary effort is normal. No respiratory distress. Chest:      Chest wall: No tenderness. Abdominal:      General: There is no distension. Palpations: Abdomen is soft. There is no mass. Tenderness: There is no abdominal tenderness. There is no guarding or rebound. Musculoskeletal:         General: Normal range of motion. Cervical back: Normal range of motion and neck supple. Lymphadenopathy:      Cervical: No cervical adenopathy. Skin:     General: Skin is warm and dry. Findings: No erythema or rash. Neurological:      Mental Status: She is alert and oriented to person, place, and time. Cranial Nerves: No cranial nerve deficit. Psychiatric:         Behavior: Behavior normal.         Thought Content:  Thought content normal.         Judgment: Judgment normal.         IMAGING/LABS    CBC  Order: [id number]   Status: Edited Result - FINAL     Visible to patient: Yes (not seen)     Next appt: 05/05/2022 at 01:30 PM in Internal Medicine (Tee Yee, ANÍBAL - CNP)     1 Result Note    Component Ref Range & Units 10/29/21 0755 1/19/21 1412 1/6/20 0745 4/8/19 0820 8/18/16 2203   WBC 4.0 - 11.0 X10E9/L 8.4  10.3 R  8.7 R  8.4 R  10.1 RMHPNLAB    RBC 3.80 - 5.20 X10E12/L 4.86  4.82 R  4.90  4.85 R  4.47 RMHPNLAB    Hemoglobin 11.7 - 15.5 g/dL 14.9  14.6 R  15.3 R  15.1 R  13.9 RM[facility]PNLAB    Hematocrit 35 - 47 % 44.0  44.0 R  44.6  43.4 R  40.5 RM[facility]PNLAB    MCV 80 - 100 fL 91  91.3 R  91 R  89.5 R  90.5 MHPNLAB    MCH 27 - 34 pg 30.7  30.3 R  31.2 R  31.1 R  31.0 RM[facility]PNLAB    MCHC 32 - 36 g/dL 33.9  33.2 R  34.3  34.8 R  34.2 RM[facility]PNLAB    RDW 11.5 - 15.0 % 13.5  12.2 R  13.0 R  12.1 R  12.7 RM[facility]PNLAB    Platelets 762 - 405 N07A7/C 199  216 R  218  237 R  218 RM[facility]PNLAB    MPV 7 - 12 fL 9.4  10.7 R  9.0   NOT REPORTED R[1]    Neutrophils % % 53.9   59.3  55.5     Absolute Neut # 1.5 - 6.6 X10E9/L 4.6   5.2  4.7 R     Lymphocyte % % 37.0   31.5  34.8     Absolute Lymph # 1.0 - 3.5 X10E9/L 3.1   2.8  2.9 R  3.30 RM[facility]PNLAB    Monocytes % 7.6   7.4  7.6  8 RM[facility]PNLAB    Absolute Mono # 0 - 0.9 X10E9/L 0.6   0.7  0.6 R  0.80 RM[facility]PNLAB    Eosinophils % % 1.3   1.3  1.1     Absolute Eos # 0.0 - 0.4 X10E9/L 0.1   0.1  0.1 R  0.10 RM[facility]PNLAB    Basophils % % 0.2   0.5  0.6 CM     Absolute Baso # 0.0 - 0.2 X10E9/L 0.0   0.0 R, CM  0.1 R  0.00 R,             ASSESSMENT     Diagnosis Orders   1. Epigastric pain     2. Gastroesophageal reflux disease, unspecified whether esophagitis present     3. History of chronic gastritis     4. BMI 35.0-35.9,adult     5. Family history of colitis     6. Tobacco abuse         PLAN    Discussed at length with Ms. Rojas her long history of epigastric pain with reflux symptoms, nausea, worsening over the last several weeks. Continue proton pump inhibition. Grimes diet for now. Increase water intake and physical activity. If symptoms worsen, or fail to resolve will repeat EGD. Risks, benefits, alternatives thoroughly reviewed and accepted by Ms. Rojas including GI bleeding, perforation, missed lesions, Covid 19 exposure/infection, etc.     Catia Agustin MD

## 2022-01-30 ASSESSMENT — ENCOUNTER SYMPTOMS
TROUBLE SWALLOWING: 0
ABDOMINAL PAIN: 1
NAUSEA: 0
CHOKING: 0
ABDOMINAL DISTENTION: 1
SORE THROAT: 0
BACK PAIN: 0
VOMITING: 0
BLOOD IN STOOL: 0
COUGH: 0
SHORTNESS OF BREATH: 0

## 2022-01-30 NOTE — PATIENT INSTRUCTIONS
Patient Education        Upper GI Endoscopy: Before Your Procedure  What is an upper GI endoscopy? An upper gastrointestinal (or GI) endoscopy is a test that allows your doctor to look at the inside of your esophagus, stomach, and the first part of your small intestine, called the duodenum. The esophagus is the tube that carries food to your stomach. The doctor uses a thin, lighted tube that bends. It is called an endoscope, or scope. The doctor puts the tip of the scope in your mouth and gently moves it down your throat. The scope is a flexible video camera. The doctor looks at a monitor (like a TV set or a computer screen) as he or she moves the scope. A doctor may do this procedure to look for ulcers, tumors, infection, or bleeding. It also can be used to look for signs of acid backing up into your esophagus. This is called gastroesophageal reflux disease, or GERD. The doctor can use the scope to take a sample of tissue for study (a biopsy). The doctor also can use the scope to take out growths or stop bleeding. Follow-up care is a key part of your treatment and safety. Be sure to make and go to all appointments, and call your doctor if you are having problems. It's also a good idea to know your test results and keep a list of the medicines you take. How do you prepare for the procedure? Procedures can be stressful. This information will help you understand what you can expect. And it will help you safely prepare for your procedure. Preparing for the procedure    · Do not eat or drink anything for 6 to 8 hours before the test. An empty stomach helps your doctor see your stomach clearly during the test. It also reduces your chances of vomiting. If you vomit, there is a small risk that the vomit could enter your lungs.  (This is called aspiration.) If the test is done in an emergency, a tube may be inserted through your nose or mouth to empty your stomach.     · Do not take sucralfate (Carafate) or antacids on the day of the test. These medicines can make it hard for your doctor to see your upper GI tract.     · If your doctor tells you to, stop taking iron supplements 7 to 14 days before the test.     · Be sure you have someone to take you home. Anesthesia and pain medicine will make it unsafe for you to drive or get home on your own.     · Understand exactly what procedure is planned, along with the risks, benefits, and other options. · Tell your doctor ALL the medicines, vitamins, supplements, and herbal remedies you take. Some may increase the risk of problems during your procedure. Your doctor will tell you if you should stop taking any of them before the procedure and how soon to do it.     · If you take aspirin or some other blood thinner, ask your doctor if you should stop taking it before your procedure. Make sure that you understand exactly what your doctor wants you to do. These medicines increase the risk of bleeding.     · Make sure your doctor and the hospital have a copy of your advance directive. If you don't have one, you may want to prepare one. It lets others know your health care wishes. It's a good thing to have before any type of surgery or procedure. What happens on the day of the procedure? · Follow the instructions exactly about when to stop eating and drinking. If you don't, your procedure may be canceled. If your doctor told you to take your medicines on the day of the procedure, take them with only a sip of water.     · Take a bath or shower before you come in for your procedure. Do not apply lotions, perfumes, deodorants, or nail polish.     · Take off all jewelry and piercings. And take out contact lenses, if you wear them. At the hospital or surgery center   · Bring a picture ID.     · The test may take 15 to 30 minutes.     · The doctor may spray medicine on the back of your throat to numb it.  You also will get medicine to prevent pain and to relax you.     · You will lie on your left side. The doctor will put the scope in your mouth and toward the back of your throat. The doctor will tell you when to swallow. This helps the scope move down your throat. You will be able to breathe normally. The doctor will move the scope down your esophagus into your stomach. The doctor also may look at the duodenum.     · If your doctor wants to take a sample of tissue for a biopsy, he or she may use small surgical tools, which are put into the scope, to cut off some tissue. You will not feel a biopsy, if one is taken. The doctor also can use the tools to stop bleeding or to do other treatments, if needed.     · You will stay at the hospital or surgery center for 1 to 2 hours until the medicine you were given wears off. What happens after an upper GI endoscopy? · After the test, you may belch and feel bloated for a while.     · You may have a tickling, dry throat or mouth. You may feel a bit hoarse, and you may have a mild sore throat. These symptoms may last several days. Throat lozenges and warm saltwater gargles can help relieve the throat symptoms.     · Ask your doctor when you can drive again.     · Your doctor will tell you when you can go back to your usual diet and activities.     · Don't drink alcohol for 12 to 24 hours after the test.   When should you call your doctor? · You have questions or concerns.     · You don't understand how to prepare for your procedure.     · You become ill before the procedure (such as fever, flu, or a cold).     · You need to reschedule or have changed your mind about having the procedure. Where can you learn more? Go to https://Arrayit.new test company. org and sign in to your [employer] account. Enter P790 in the [employer] box to learn more about \"Upper GI Endoscopy: Before Your Procedure. \"     If you do not have an account, please click on the \"Sign Up Now\" link.   Current as of: September 8, 2021               Content Version: 13.1  © 8482-0289 Healthwise, Incorporated. Care instructions adapted under license by [facility] ([facility]). If you have questions about a medical condition or this instruction, always ask your healthcare professional. [address] any warranty or liability for your use of this information. Patient Education        Gastritis: Care Instructions  Your Care Instructions     Gastritis is a sore and upset stomach. It happens when something irritates the stomach lining. Many things can cause it. These include an infection such as the flu or something you ate or drank. Medicines or a sore on the lining of the stomach (ulcer) also can cause it. Your belly may bloat and ache. You may belch, vomit, and feel sick to your stomach. You should be able to relieve the problem by taking medicine. And it may help to change your diet. If gastritis lasts, your doctor may prescribe medicine. Follow-up care is a key part of your treatment and safety. Be sure to make and go to all appointments, and call your doctor if you are having problems. It's also a good idea to know your test results and keep a list of the medicines you take. How can you care for yourself at home? · If your doctor prescribed antibiotics, take them as directed. Do not stop taking them just because you feel better. You need to take the full course of antibiotics. · Be safe with medicines. If your doctor prescribed medicine to decrease stomach acid, take it as directed. Call your doctor if you think you are having a problem with your medicine. · Do not take any other medicine, including over-the-counter pain relievers, without talking to your doctor first.  · If your doctor recommends over-the-counter medicine to reduce stomach acid, such as Pepcid AC (famotidine), Prilosec (omeprazole), or Tagamet HB (cimetidine) follow the directions on the label. · Drink plenty of fluids to prevent dehydration. Choose water and other clear liquids.  If you have kidney, heart, or liver disease and have to limit fluids, talk with your doctor before you increase the amount of fluids you drink. · Avoid foods that make your symptoms worse. These may include chocolate, mint, alcohol, pepper, spicy foods, high-fat foods, or drinks with caffeine in them, such as tea, coffee, sean, or energy drinks. If your symptoms are worse after you eat a certain food, you may want to stop eating it to see if your symptoms get better. When should you call for help? Call 911 anytime you think you may need emergency care. For example, call if:    · You vomit blood or what looks like coffee grounds.     · You pass maroon or very bloody stools. Call your doctor now or seek immediate medical care if:    · You start breathing fast and have not produced urine in the last 8 hours.     · You cannot keep fluids down. Watch closely for changes in your health, and be sure to contact your doctor if:    · You do not get better as expected. Where can you learn more? Go to https://MyEnergy.JayCut. org and sign in to your [employer] account. Enter [id number] in the [facility] box to learn more about \"Gastritis: Care Instructions. \"     If you do not have an account, please click on the \"Sign Up Now\" link. Current as of: September 8, 2021               Content Version: 13.1  © 7967-7928 Healthwise, Incorporated. Care instructions adapted under license by [facility] ([facility]). If you have questions about a medical condition or this instruction, always ask your healthcare professional. [place] [address] any warranty or liability for your use of this information.

## 2022-05-16 ENCOUNTER — TELEPHONE (OUTPATIENT)
Dept: SURGERY | Age: 52
End: 2022-05-16

## 2022-05-16 DIAGNOSIS — R10.13 EPIGASTRIC PAIN: Primary | ICD-10-CM

## 2022-05-16 DIAGNOSIS — K21.9 GASTROESOPHAGEAL REFLUX DISEASE, UNSPECIFIED WHETHER ESOPHAGITIS PRESENT: ICD-10-CM

## 2022-05-16 DIAGNOSIS — Z87.19 HISTORY OF CHRONIC GASTRITIS: ICD-10-CM

## 2022-05-16 NOTE — TELEPHONE ENCOUNTER
Patient was scheduled for an appointment with Dr. Shelly Salcedo today to discuss scheduling repeat EGD. Appointment was cancelled due to change in providers schedule. Please refer patient to Dr. Urmila Lind to discuss.

## 2022-06-02 ENCOUNTER — HOSPITAL ENCOUNTER (OUTPATIENT)
Dept: WOMENS IMAGING | Age: 52
Discharge: HOME | End: 2022-06-04
Payer: COMMERCIAL

## 2022-06-02 DIAGNOSIS — Z12.31 SCREENING MAMMOGRAM FOR HIGH-RISK PATIENT: ICD-10-CM

## 2022-06-02 PROCEDURE — 77063 BREAST TOMOSYNTHESIS BI: CPT

## 2022-06-06 ENCOUNTER — HOSPITAL ENCOUNTER (OUTPATIENT)
Dept: GENERAL RADIOLOGY | Age: 52
Discharge: HOME | End: 2022-06-08
Payer: COMMERCIAL

## 2022-06-06 DIAGNOSIS — R10.13 EPIGASTRIC PAIN: ICD-10-CM

## 2022-06-06 DIAGNOSIS — R13.10 DYSPHAGIA, UNSPECIFIED TYPE: ICD-10-CM

## 2022-06-06 PROCEDURE — 6360000004 HC RX CONTRAST MEDICATION: Performed by: NURSE PRACTITIONER

## 2022-06-06 PROCEDURE — A4641 RADIOPHARM DX AGENT NOC: HCPCS | Performed by: NURSE PRACTITIONER

## 2022-06-06 PROCEDURE — 74220 X-RAY XM ESOPHAGUS 1CNTRST: CPT

## 2022-06-06 RX ADMIN — BARIUM SULFATE 355 ML: 0.6 SUSPENSION ORAL at 11:16

## 2022-06-07 ENCOUNTER — OFFICE VISIT (OUTPATIENT)
Dept: OBGYN | Age: 52
End: 2022-06-07
Payer: COMMERCIAL

## 2022-06-07 VITALS
WEIGHT: 224 LBS | HEART RATE: 60 BPM | SYSTOLIC BLOOD PRESSURE: 110 MMHG | DIASTOLIC BLOOD PRESSURE: 70 MMHG | BODY MASS INDEX: 35.16 KG/M2 | HEIGHT: 67 IN

## 2022-06-07 DIAGNOSIS — N39.41 URGE INCONTINENCE: ICD-10-CM

## 2022-06-07 DIAGNOSIS — M54.16 LUMBAR RADICULOPATHY: ICD-10-CM

## 2022-06-07 DIAGNOSIS — N81.11 CYSTOCELE, MIDLINE: ICD-10-CM

## 2022-06-07 DIAGNOSIS — Z01.419 ENCOUNTER FOR GYNECOLOGICAL EXAMINATION WITHOUT ABNORMAL FINDING: Primary | ICD-10-CM

## 2022-06-07 PROBLEM — G95.19 NEUROGENIC CLAUDICATION (HCC): Status: ACTIVE | Noted: 2022-06-07

## 2022-06-07 PROBLEM — R29.818 NEUROGENIC CLAUDICATION: Status: ACTIVE | Noted: 2022-06-07

## 2022-06-07 PROBLEM — K21.9 GASTROESOPHAGEAL REFLUX DISEASE: Status: ACTIVE | Noted: 2022-05-23

## 2022-06-07 PROBLEM — M96.1 POST-LAMINECTOMY SYNDROME: Status: ACTIVE | Noted: 2022-06-07

## 2022-06-07 PROBLEM — M54.6 PAIN IN THORACIC SPINE: Status: ACTIVE | Noted: 2022-06-07

## 2022-06-07 PROCEDURE — 99386 PREV VISIT NEW AGE 40-64: CPT | Performed by: ADVANCED PRACTICE MIDWIFE

## 2022-06-07 NOTE — PROGRESS NOTES
MEDICARE PHYSICAL    Date of service: 2022    Zeinab Lu  Is a 46 y.o.  female    PT's PCP is: ANÍBAL Bundy CNP     : 1970     HIGH RISK ASSESSMENT    Maternal ASHLEY Exposure (in utero): No    Sexual activity < 16 yrs of age: Yes    Greater than 5 sexual partners: No    3 abnormal paps in the last 7 years: No    History of any STD (including HIV): Yes, trich                                        Subjective:       No LMP recorded (lmp unknown). Patient has had a hysterectomy. Are your menses regular: not applicable    OB History    Para Term  AB Living   2 2 1     2   SAB IAB Ectopic Molar Multiple Live Births             2      # Outcome Date GA Lbr Tima/2nd Weight Sex Delivery Anes PTL Lv   2 Term 96 42w0d   M Vag-Spont None N DARRYL      Complications: Cord around body   1 Para 92 42w0d   F Vag-Spont None N DARRYL        Social History     Tobacco Use   Smoking Status Current Every Day Smoker    Packs/day: 1.00    Years: 19.00    Pack years: 19.00    Types: Cigarettes   Smokeless Tobacco Never Used        Social History     Substance and Sexual Activity   Alcohol Use Not Currently       Allergies: Bee venom, Sulfa antibiotics, and Ciprofloxacin hcl      Current Outpatient Medications:     nystatin (MYCOSTATIN) 374037 UNIT/GM powder, Apply 3 times daily. , Disp: 1 each, Rfl: 3    pantoprazole (PROTONIX) 40 MG tablet, Take 1 tablet by mouth every morning (before breakfast), Disp: 90 tablet, Rfl: 1    atorvastatin (LIPITOR) 80 MG tablet, TAKE ONE TABLET BY MOUTH DAILY, Disp: 90 tablet, Rfl: 0    atenolol (TENORMIN) 100 MG tablet, Take 1 tablet by mouth daily, Disp: 90 tablet, Rfl: 1    sucralfate (CARAFATE) 1 GM tablet, Take 1 tablet by mouth 4 times daily, Disp: 120 tablet, Rfl: 0    HYDROcodone-acetaminophen (NORCO)  MG per tablet, Take 1 tablet by mouth 3 times daily. , Disp: , Rfl:     sertraline (ZOLOFT) 100 MG tablet, Take 100 mg by mouth daily, Disp: , Rfl:     gabapentin (NEURONTIN) 300 MG capsule, Take 600 mg by mouth 3 times daily.  , Disp: , Rfl:     iloperidone (FANAPT) 6 MG tablet, Take 3 mg by mouth 2 times daily , Disp: , Rfl:     cholestyramine (QUESTRAN) 4 g packet, Take 1 packet by mouth daily Dr Zara Caraballo (Patient not taking: Reported on 6/7/2022), Disp: 90 packet, Rfl: 3    EPINEPHrine (EPIPEN 2-SHLOMO) 0.3 MG/0.3ML SOAJ injection, Use as directed for allergic reaction (Patient not taking: Reported on 6/7/2022), Disp: 1 each, Rfl: 1    omeprazole (PRILOSEC) 20 MG delayed release capsule, TAKE TWO CAPSULES BY MOUTH DAILY (Patient not taking: Reported on 5/5/2022), Disp: 180 capsule, Rfl: 1    Social History     Substance and Sexual Activity   Sexual Activity Yes    Partners: Female    Comment: hyst        Any bleeding or pain with intercourse: No    Last Yearly: 05/05/22     Last Mammogram: 06/02/22    Last [facility] never     Last colorectal screen- type:colonoscopy *  date  About almost 3 years ago     Do you do self breast exams: Yes    Past Medical History:   Diagnosis Date    Anxiety     Bipolar affective disorder ([facility] [address]     Chronic low back pain     Dr. Renay Chris side neurosurgery    Depression     Gastroesophageal reflux disease with esophagitis without hemorrhage     Dr. Benjamín Wright Hyperlipidemia     Hypertension     Irritable bowel syndrome     Osteoarthritis     Prediabetes     Schizophrenia, schizo-affective type, depressed ([address]     has had ECT tx in past       Past Surgical History:   Procedure Laterality Date   [facility] 18    exploratory lap, ovarian tubal abscess rupture    [facility]  2013    [address]    COLONOSCOPY  03/02/2021    COLONOSCOPY N/A 3/2/2021    COLONOSCOPY POLYPECTOMY REMOVAL SNARE/STOMA performed by Kassidy Brewer MD at [facility]  2003 1/2 of right ovary retained   [facility] STIMULATOR SURGERY  01/30/2020    [facility].  TONSILLECTOMY  1974    UPPER GASTROINTESTINAL ENDOSCOPY  03/02/2021    UPPER GASTROINTESTINAL ENDOSCOPY N/A 3/2/2021    EGD BIOPSY performed by Fredi Dooley MD at [facility]         Family History   Problem Relation Age of Onset    High Blood Pressure Mother     Emphysema Mother    [facility] Arthritis Mother     Other Mother         cholaginis cholitis    Heart Attack Father     Stroke Father     Diabetes Father     High Blood Pressure Father     High Cholesterol Father     Emphysema Father     Diabetes Brother     High Blood Pressure Brother     Diabetes Maternal Grandmother     High Blood Pressure Maternal Grandmother     Heart Disease Maternal Grandfather        Any family history of breast or ovarian cancer: No    Any family history of blood clots: No      Chief Complaint   Patient presents with    Gynecologic Exam     New Patient. Yearly. pt would like a breast and pelvic exam. pt states no issues or concerns. pt had a hyst and cervix removed due to heavy bleeding. pt had a recent mamm on 06/02/22. pt was told by Chiquita Slade wanted her to be seen, pt was seeing a ob/gyn in [facility]         PE:  Vital Signs  Blood pressure 110/70, pulse 60, height 5' 7\" (1.702 m), weight 224 lb (101.6 kg), not currently breastfeeding. Estimated body mass index is 35.08 kg/m² as calculated from the following:    Height as of this encounter: 5' 7\" (1.702 m). Weight as of this encounter: 224 lb (101.6 kg). Labs:    No results found for this visit on 06/07/22. No data recorded      NURSE: JULIETA    HPI: Here today for routine well woman exam.  Patient states she is having some significant hot flashes. Pt was wondering how long this would go on. Pt also states her wife works 2 jobs but has decreased libido    Review of Systems   All other systems reviewed and are negative.         Objective  Lymphatic:   no lymphadenopathy  Heent: negative   Cor: regular rate and rhythm, no murmurs              Pul:clear to auscultation bilaterally- no wheezes, rales or rhonchi, normal air movement, no respiratory distress      GI: Abdomen soft, non-tender. BS normal. No masses,  No organomegaly           Extremities: normal strength, tone, and muscle mass  Pain nerve stimulator located in lower spine   Breasts: Breast:normal appearance, no masses or tenderness   Pelvic Exam: GENITAL/URINARY:  External Genitalia:  General appearance; normal, Hair distribution; normal, Lesions absent  Urethra: Fullness absent, Masses absent  Bladder:  Fullness absent, Masses absent, Tenderness absent, Cystocele noted - mild  Vagina:  General appearance normal, Estrogen effect normal, Discharge absent, Lesions absent, Pelvic support normal  Cervix: removed  Uterus:  Removed  Adenexa: Patient states only has half of right ovary                                    Vaginal discharge: no vaginal discharge                               Assessment and Plan          Diagnosis Orders   1. Encounter for gynecological examination without abnormal finding     2. Lumbar radiculopathy     3. Cystocele, midline  Ambulatory referral to Physical Therapy   4. Urge incontinence  Ambulatory referral to Physical Therapy             I am having Grazyna Rojas maintain her iloperidone, gabapentin, sertraline, HYDROcodone-acetaminophen, sucralfate, atenolol, atorvastatin, omeprazole, pantoprazole, cholestyramine, EPINEPHrine, and nystatin. Return in about 1 year (around 6/7/2023) for yearly & PT referal & pt will call alonzo in [place]. She was also counseled on her preventative health maintenance recommendations and follow-up. Patient Instructions         SURVEY:    You may be receiving a survey regarding your visit today. Please complete the survey to enable us to provide the highest quality of care to you and your family.     We strive for all 5's - thank you    If you cannot score us a very good (5) on any question, please call the office to discuss this with Shravan Sarmiento (our ). We would like to discuss how we could of made your experience a very good one. Shravan Sarmiento: [phone]    Thank you.           ANÍBAL Enamorado CNM,6/7/2022 2:04 PM

## 2022-06-07 NOTE — PATIENT INSTRUCTIONS
SURVEY:    You may be receiving a survey regarding your visit today. Please complete the survey to enable us to provide the highest quality of care to you and your family. We strive for all 5's - thank you    If you cannot score us a very good (5) on any question, please call the office to discuss this with Slade Goodrich (our ). We would like to discuss how we could of made your experience a very good one. Slade Goodrich: [phone]    Thank you.

## 2022-11-29 ENCOUNTER — APPOINTMENT (OUTPATIENT)
Dept: CT IMAGING | Age: 52
End: 2022-11-29
Payer: MEDICARE

## 2022-11-29 ENCOUNTER — HOSPITAL ENCOUNTER (EMERGENCY)
Age: 52
Discharge: HOME | End: 2022-11-30
Attending: EMERGENCY MEDICINE
Payer: MEDICARE

## 2022-11-29 VITALS
SYSTOLIC BLOOD PRESSURE: 124 MMHG | WEIGHT: 220 LBS | OXYGEN SATURATION: 97 % | BODY MASS INDEX: 34.53 KG/M2 | RESPIRATION RATE: 18 BRPM | HEART RATE: 84 BPM | TEMPERATURE: 98.1 F | HEIGHT: 67 IN | DIASTOLIC BLOOD PRESSURE: 72 MMHG

## 2022-11-29 DIAGNOSIS — R10.32 ABDOMINAL PAIN, LEFT LOWER QUADRANT: Primary | ICD-10-CM

## 2022-11-29 LAB
ABSOLUTE EOS #: 0.11 K/UL (ref 0–0.44)
ABSOLUTE IMMATURE GRANULOCYTE: 0.03 K/UL (ref 0–0.3)
ABSOLUTE LYMPH #: 4.13 K/UL (ref 1.1–3.7)
ABSOLUTE MONO #: 0.78 K/UL (ref 0.1–1.2)
ALBUMIN SERPL-MCNC: 4.5 G/DL (ref 3.5–5.2)
ALBUMIN/GLOBULIN RATIO: 1.7 (ref 1–2.5)
ALP BLD-CCNC: 82 U/L (ref 35–104)
ALT SERPL-CCNC: 37 U/L (ref 5–33)
ANION GAP SERPL CALCULATED.3IONS-SCNC: 11 MMOL/L (ref 9–17)
AST SERPL-CCNC: 26 U/L
BASOPHILS # BLD: 0 % (ref 0–2)
BASOPHILS ABSOLUTE: 0.03 K/UL (ref 0–0.2)
BILIRUB SERPL-MCNC: 0.5 MG/DL (ref 0.3–1.2)
BILIRUBIN URINE: NEGATIVE
BUN BLDV-MCNC: 14 MG/DL (ref 6–20)
BUN/CREAT BLD: 25 (ref 9–20)
CALCIUM SERPL-MCNC: 9.9 MG/DL (ref 8.6–10.4)
CHLORIDE BLD-SCNC: 98 MMOL/L (ref 98–107)
CO2: 27 MMOL/L (ref 20–31)
COLOR: YELLOW
CREAT SERPL-MCNC: 0.55 MG/DL (ref 0.5–0.9)
EOSINOPHILS RELATIVE PERCENT: 1 % (ref 1–4)
EPITHELIAL CELLS UA: ABNORMAL /HPF (ref 0–25)
GFR SERPL CREATININE-BSD FRML MDRD: >60 ML/MIN/1.73M2
GLUCOSE BLD-MCNC: 107 MG/DL (ref 70–99)
GLUCOSE URINE: NEGATIVE
HCT VFR BLD CALC: 41.5 % (ref 36.3–47.1)
HEMOGLOBIN: 14.4 G/DL (ref 11.9–15.1)
IMMATURE GRANULOCYTES: 0 %
KETONES, URINE: NEGATIVE
LEUKOCYTE ESTERASE, URINE: NEGATIVE
LIPASE: 16 U/L (ref 13–60)
LYMPHOCYTES # BLD: 36 % (ref 24–43)
MCH RBC QN AUTO: 31.8 PG (ref 25.2–33.5)
MCHC RBC AUTO-ENTMCNC: 34.7 G/DL (ref 28.4–34.8)
MCV RBC AUTO: 91.6 FL (ref 82.6–102.9)
MONOCYTES # BLD: 7 % (ref 3–12)
NITRITE, URINE: NEGATIVE
NRBC AUTOMATED: 0 PER 100 WBC
PDW BLD-RTO: 12.2 % (ref 11.8–14.4)
PH UA: 6 (ref 5–9)
PLATELET # BLD: 202 K/UL (ref 138–453)
PMV BLD AUTO: 10.9 FL (ref 8.1–13.5)
POTASSIUM SERPL-SCNC: 3.7 MMOL/L (ref 3.7–5.3)
PROTEIN UA: NEGATIVE
RBC # BLD: 4.53 M/UL (ref 3.95–5.11)
RBC UA: ABNORMAL /HPF (ref 0–2)
SEG NEUTROPHILS: 56 % (ref 36–65)
SEGMENTED NEUTROPHILS ABSOLUTE COUNT: 6.43 K/UL (ref 1.5–8.1)
SODIUM BLD-SCNC: 136 MMOL/L (ref 135–144)
SPECIFIC GRAVITY UA: 1.02 (ref 1.01–1.02)
TOTAL PROTEIN: 7.1 G/DL (ref 6.4–8.3)
TURBIDITY: CLEAR
URINE HGB: NEGATIVE
UROBILINOGEN, URINE: NORMAL
WBC # BLD: 11.5 K/UL (ref 3.5–11.3)
WBC UA: ABNORMAL /HPF (ref 0–5)

## 2022-11-29 PROCEDURE — 2580000003 HC RX 258: Performed by: EMERGENCY MEDICINE

## 2022-11-29 PROCEDURE — 81001 URINALYSIS AUTO W/SCOPE: CPT

## 2022-11-29 PROCEDURE — 99285 EMERGENCY DEPT VISIT HI MDM: CPT

## 2022-11-29 PROCEDURE — 36415 COLL VENOUS BLD VENIPUNCTURE: CPT

## 2022-11-29 PROCEDURE — 83690 ASSAY OF LIPASE: CPT

## 2022-11-29 PROCEDURE — 85025 COMPLETE CBC W/AUTO DIFF WBC: CPT

## 2022-11-29 PROCEDURE — 6360000004 HC RX CONTRAST MEDICATION: Performed by: EMERGENCY MEDICINE

## 2022-11-29 PROCEDURE — 74177 CT ABD & PELVIS W/CONTRAST: CPT

## 2022-11-29 PROCEDURE — 80053 COMPREHEN METABOLIC PANEL: CPT

## 2022-11-29 RX ORDER — BUPRENORPHINE 10 UG/H
1 PATCH TRANSDERMAL WEEKLY
COMMUNITY

## 2022-11-29 RX ORDER — SODIUM CHLORIDE 0.9 % (FLUSH) 0.9 %
3 SYRINGE (ML) INJECTION EVERY 8 HOURS
Status: DISCONTINUED | OUTPATIENT
Start: 2022-11-29 | End: 2022-11-30 | Stop reason: HOSPADM

## 2022-11-29 RX ADMIN — IOPAMIDOL 75 ML: 755 INJECTION, SOLUTION INTRAVENOUS at 22:39

## 2022-11-29 RX ADMIN — SODIUM CHLORIDE, PRESERVATIVE FREE 3 ML: 5 INJECTION INTRAVENOUS at 23:40

## 2022-11-29 ASSESSMENT — PAIN DESCRIPTION - LOCATION: LOCATION: ABDOMEN

## 2022-11-29 ASSESSMENT — PAIN DESCRIPTION - PAIN TYPE: TYPE: ACUTE PAIN

## 2022-11-29 ASSESSMENT — PAIN DESCRIPTION - ORIENTATION: ORIENTATION: LEFT

## 2022-11-29 ASSESSMENT — PAIN SCALES - GENERAL: PAINLEVEL_OUTOF10: 4

## 2022-11-29 ASSESSMENT — PAIN DESCRIPTION - DESCRIPTORS: DESCRIPTORS: ACHING;SHARP

## 2022-11-30 LAB
DIRECT EXAM: NORMAL
SPECIMEN DESCRIPTION: NORMAL

## 2022-11-30 PROCEDURE — 87210 SMEAR WET MOUNT SALINE/INK: CPT

## 2022-11-30 ASSESSMENT — ENCOUNTER SYMPTOMS
SHORTNESS OF BREATH: 0
VOICE CHANGE: 0
BACK PAIN: 0
COLOR CHANGE: 0
PHOTOPHOBIA: 0
NAUSEA: 1
TROUBLE SWALLOWING: 0
CHEST TIGHTNESS: 0
ABDOMINAL PAIN: 1

## 2022-11-30 NOTE — ED NOTES
Patient states that she has pain since 1100 am this morning in her lower left pelvic. Sharp. No other issues.      Emerita Chanel RN  11/29/22 [id number]

## 2022-11-30 NOTE — ED PROVIDER NOTES
[facility] ED  EMERGENCY DEPARTMENT ENCOUNTER      Pt Name: Erasmo Vilchis  MRN: [id number]  [place] 1970  Date of evaluation: 11/29/2022  Provider: Rosina Mckeon, [facility]       Chief Complaint   Patient presents with    Abdominal Pain     Left flank pain and pain in groin started this morning. Pelvic Pain         HISTORY OF PRESENT ILLNESS   (Location/Symptom, Timing/Onset, Context/Setting, Quality, Duration, Modifying Factors, Severity)  Note limiting factors. Erasmo Vilchis is a 46 y.o. female who presents to the emergency department      This is a 80-year-old female who is presenting with complaint of left lower quadrant/left pelvic type pain. The patient reports that it started earlier in the day and seemed to radiate downwards into this area. She states that it was hard to walk at some point. She states that she is noted some odor in the vaginal discharge but has not had the same female sexual partner for 15 years and is not concerned for STDs. She reports that she has not had a kidney stone previously. The patient reports that the pain is significant. Nursing Notes were reviewed. REVIEW OF SYSTEMS    (2-9 systems for level 4, 10 or more for level 5)     Review of Systems   Constitutional:  Positive for activity change, appetite change and fatigue. HENT:  Negative for trouble swallowing and voice change. Eyes:  Negative for photophobia and visual disturbance. Respiratory:  Negative for chest tightness and shortness of breath. Cardiovascular:  Negative for chest pain and palpitations. Gastrointestinal:  Positive for abdominal pain and nausea. Genitourinary:  Positive for flank pain, pelvic pain and vaginal discharge. Musculoskeletal:  Negative for arthralgias and back pain. Skin:  Negative for color change and pallor. Neurological:  Negative for dizziness and light-headedness. Psychiatric/Behavioral:  Negative for confusion.  The patient is nervous/anxious. Except as noted above the remainder of the review of systems was reviewed and negative. PAST MEDICAL HISTORY     Past Medical History:   Diagnosis Date    Anxiety     Bipolar affective disorder ([facility] [address]     Chronic low back pain     Dr. Magdaleno Parker side neurosurgery    Depression     Gastroesophageal reflux disease with esophagitis without hemorrhage     Dr. Flavia Trammell    Hyperlipidemia     Hypertension     Irritable bowel syndrome     Osteoarthritis     Prediabetes     Schizophrenia, schizo-affective type, depressed ([address]     has had ECT tx in past         SURGICAL HISTORY       Past Surgical History:   Procedure Laterality Date    [address]    exploratory lap, ovarian tubal abscess rupture    APPENDECTOMY  1995    CARDIAC CATHETERIZATION  2013    NORMAL    CHOLECYSTECTOMY  1995    COLONOSCOPY  03/02/2021    COLONOSCOPY N/A 3/2/2021    COLONOSCOPY POLYPECTOMY REMOVAL SNARE/STOMA performed by Tamara Quevedo MD at [place] [address] [facility] ([facility])  2003 1/2 of right ovary retained    [address]  01/30/2020    [facility]. TONSILLECTOMY  1974    UPPER GASTROINTESTINAL ENDOSCOPY  03/02/2021    UPPER GASTROINTESTINAL ENDOSCOPY N/A 3/2/2021    EGD BIOPSY performed by Tamara Quevedo MD at [address] [place] [address] Dr       Discharge Medication List as of 11/30/2022 12:35 AM        CONTINUE these medications which have NOT CHANGED    Details   buprenorphine (BUTRANS) 10 MCG/HR [address] patch onto the skin once a week. Historical Med      pantoprazole (PROTONIX) 40 MG tablet Take 1 tablet by mouth every morning (before breakfast), Disp-90 tablet, R-1Normal      atorvastatin (LIPITOR) 80 MG tablet TAKE ONE TABLET BY MOUTH DAILY, Disp-90 tablet, R-1Normal      cholestyramine (QUESTRAN) 4 g packet Take 1 packet by mouth daily, Disp-90 packet, R-1Normal      atenolol (TENORMIN) 100 MG tablet Take 1 tablet by mouth daily, Disp-90 tablet, R-1Normal      sucralfate (CARAFATE) 1 GM tablet Take 1 tablet by mouth 4 times daily, Disp-120 tablet, R-0Normal      HYDROcodone-acetaminophen (NORCO)  MG per tablet Take 1 tablet by mouth 3 times daily. Historical Med      sertraline (ZOLOFT) 100 MG tablet Take 100 mg by mouth dailyHistorical Med      gabapentin (NEURONTIN) 300 MG capsule Take 600 mg by mouth 3 times daily.  Historical Med      iloperidone (FANAPT) 6 MG tablet Take 3 mg by mouth 2 times daily Historical Med             ALLERGIES     Bee venom, Sulfa antibiotics, and Ciprofloxacin hcl    FAMILY HISTORY       Family History   Problem Relation Age of Onset    High Blood Pressure Mother     Emphysema Mother     Arthritis Mother     Other Mother         cholaginis cholitis    Heart Attack Father     Stroke Father     Diabetes Father     High Blood Pressure Father     High Cholesterol Father     Emphysema Father     Diabetes Brother     High Blood Pressure Brother     Diabetes Maternal Grandmother     High Blood Pressure Maternal Grandmother     Heart Disease Maternal Grandfather           SOCIAL HISTORY       Social History     Socioeconomic History    Marital status:    Tobacco Use    Smoking status: Every Day     Packs/day: 1.00     Years: 19.00     Pack years: 19.00     Types: Cigarettes    Smokeless tobacco: Never   Vaping Use    Vaping Use: Never used   Substance and Sexual Activity    Alcohol use: Not Currently    Drug use: No    Sexual activity: Yes     Partners: Female     Comment: [facility]      Social Determinants of Health     Financial Resource Strain: Low Risk     Difficulty of Paying Living Expenses: Not hard at all   Food Insecurity: No Food Insecurity    Worried About Running Out of Food in the Last Year: Never true    Ran Out of Food in the Last Year: Never true   Transportation Needs: No Transportation Needs    Lack of Transportation (Medical): No    Lack of Transportation (Non-Medical): No   Physical Activity: Insufficiently Active    Days of Exercise per Week: 2 days    Minutes of Exercise per Session: 20 min   Stress: No Stress Concern Present    Feeling of Stress : Not at all   Social Connections: Socially Isolated    Frequency of Communication with Friends and Family: More than three times a week    Frequency of Social Gatherings with Friends and Family: More than three times a week    Attends Mosque Services: Never    Active Member of Clubs or Organizations: No    Attends Club or Organization Meetings: Never    Marital Status:    Intimate Partner Violence: Not At Risk    Fear of Current or Ex-Partner: No    Emotionally Abused: No    Physically Abused: No    Sexually Abused: No       SCREENINGS         Clayton Coma Scale  Eye Opening: Spontaneous  Best Verbal Response: Oriented  Best Motor Response: Obeys commands  Clayton Coma Scale Score: 15                     CIWA Assessment  BP: 124/72  Heart Rate: 84                 PHYSICAL EXAM    (up to 7 for level 4, 8 or more for level 5)     ED Triage Vitals   BP Temp Temp Source Heart Rate Resp SpO2 Height Weight   11/29/22 1853 11/29/22 1853 11/29/22 1853 11/29/22 1853 11/29/22 1853 11/29/22 1853 11/29/22 2114 11/29/22 2114   124/72 98.1 °F (36.7 °C) Tympanic 84 18 97 % 5' 7\" (1.702 m) 220 lb (99.8 kg)       Physical Exam  Vitals and nursing note reviewed. HENT:      Head: Normocephalic. Cardiovascular:      Rate and Rhythm: Normal rate and regular rhythm. Heart sounds: Normal heart sounds. Pulmonary:      Effort: Pulmonary effort is normal.   Abdominal:      Palpations: Abdomen is soft. Tenderness: There is abdominal tenderness in the suprapubic area and left lower quadrant. There is guarding. There is no rebound. Negative signs include Rausch's sign, Rovsing's sign and McBurney's sign. Genitourinary:     Vagina: Normal.      Cervix: Discharge (physiologic) present. No cervical motion tenderness or friability. Skin:     General: Skin is warm and dry. Capillary Refill: Capillary refill takes less than 2 seconds. Neurological:      General: No focal deficit present. Mental Status: She is alert and oriented to person, place, and time. Psychiatric:         Mood and Affect: Mood is anxious. DIAGNOSTIC RESULTS     EKG: All EKG's are interpreted by the Emergency Department Physician who either signs or Co-signs this chart in the absence of a cardiologist.        RADIOLOGY:   Non-plain film images such as CT, Ultrasound and MRI are read by the radiologist. Plain radiographic images are visualized and preliminarily interpreted by the emergency physician with the below findings:        Interpretation per the Radiologist below, if available at the time of this note:    CT ABDOMEN PELVIS W IV CONTRAST Additional Contrast? None   Final Result   1. No acute abnormality. 2. Hepatic steatosis. ED BEDSIDE ULTRASOUND:   Performed by ED Physician - none    LABS:  Labs Reviewed   URINALYSIS WITH MICROSCOPIC - Abnormal; Notable for the following components:       Result Value    Specific Gravity, UA 1.025 (*)     All other components within normal limits   CBC WITH AUTO DIFFERENTIAL - Abnormal; Notable for the following components:    WBC 11.5 (*)     Absolute Lymph # 4.13 (*)     All other components within normal limits   COMPREHENSIVE METABOLIC PANEL - Abnormal; Notable for the following components:    Glucose 107 (*)     Bun/Cre Ratio 25 (*)     ALT 37 (*)     All other components within normal limits   WET PREP, GENITAL   LIPASE       All other labs were within normal range or not returned as of this dictation.     EMERGENCY DEPARTMENT COURSE and DIFFERENTIAL DIAGNOSIS/MDM:   Vitals:    Vitals:    11/29/22 1853 11/29/22 2114   BP: 124/72    Pulse: 84    Resp: 18    Temp: 98.1 °F (36.7 °C)    TempSrc: Tympanic    SpO2: 97%    Weight:  220 lb (99.8 kg)   Height:  5' 7\" (1.702 m)           MDM  Number of Diagnoses or Management Options  Abdominal pain, left lower quadrant  Diagnosis management comments: 49-year-old female presenting with left lower quadrant pain/pelvic pain, the CT is not demonstrating any acute process. Pelvic exam did not elicit any significant pain, swabs were sent for the patient's complaint of foul odor. Amount and/or Complexity of Data Reviewed  Decide to obtain previous medical records or to obtain history from someone other than the patient: yes          [facility]   Total Critical Care time was  minutes, excluding separately reportable procedures. There was a high probability of clinically significant/life threatening deterioration in the patient's condition which required my urgent intervention. CONSULTS:  None    PROCEDURES:  Unless otherwise noted below, none     Procedures        FINAL IMPRESSION      1. Abdominal pain, left lower quadrant          DISPOSITION/PLAN   DISPOSITION Decision To Discharge 11/30/2022 12:26:49 AM      PATIENT REFERRED TO:  ANÍBAL Pierce CNP [address]  [place] [address] [place] [address]  [phone]    Schedule an appointment as soon as possible for a visit in 2 days      [facility] ED  [address] [facility]  [phone]    If symptoms worsen    DISCHARGE MEDICATIONS:  Discharge Medication List as of 11/30/2022 12:35 AM        Controlled Substances Monitoring:     No flowsheet data found.     (Please note that portions of this note were completed with a voice recognition program.  Efforts were made to edit the dictations but occasionally words are mis-transcribed.)    Lula Spatz, DO (electronically signed)  Attending Emergency Physician            Lula Spatz, DO  11/30/22 [id number]

## 2023-05-22 PROBLEM — G89.4 CHRONIC PAIN DISORDER: Status: ACTIVE | Noted: 2023-05-22

## 2023-05-22 PROBLEM — Z01.419 ENCOUNTER FOR WELL WOMAN EXAM: Status: ACTIVE | Noted: 2023-05-22

## 2023-05-23 ENCOUNTER — HOSPITAL ENCOUNTER (OUTPATIENT)
Dept: NON INVASIVE DIAGNOSTICS | Age: 53
Discharge: HOME | End: 2023-05-23
Payer: COMMERCIAL

## 2023-05-23 DIAGNOSIS — R07.9 CHEST PAIN, UNSPECIFIED TYPE: ICD-10-CM

## 2023-05-23 DIAGNOSIS — I10 ESSENTIAL HYPERTENSION, BENIGN: ICD-10-CM

## 2023-05-23 DIAGNOSIS — E78.2 MIXED HYPERLIPIDEMIA: ICD-10-CM

## 2023-05-23 DIAGNOSIS — Z12.39 ENCOUNTER FOR SCREENING FOR MALIGNANT NEOPLASM OF BREAST, UNSPECIFIED SCREENING MODALITY: ICD-10-CM

## 2023-05-23 PROCEDURE — 6360000002 HC RX W HCPCS: Performed by: FAMILY MEDICINE

## 2023-05-23 PROCEDURE — A9500 TC99M SESTAMIBI: HCPCS | Performed by: NURSE PRACTITIONER

## 2023-05-23 PROCEDURE — 3430000000 HC RX DIAGNOSTIC RADIOPHARMACEUTICAL: Performed by: NURSE PRACTITIONER

## 2023-05-23 PROCEDURE — 93017 CV STRESS TEST TRACING ONLY: CPT

## 2023-05-23 RX ORDER — TETRAKIS(2-METHOXYISOBUTYLISOCYANIDE)COPPER(I) TETRAFLUOROBORATE 1 MG/ML
30 INJECTION, POWDER, LYOPHILIZED, FOR SOLUTION INTRAVENOUS
Status: COMPLETED | OUTPATIENT
Start: 2023-05-23 | End: 2023-05-23

## 2023-05-23 RX ADMIN — REGADENOSON 0.4 MG: 0.08 INJECTION, SOLUTION INTRAVENOUS at 11:31

## 2023-05-23 RX ADMIN — Medication 30 MILLICURIE: at 11:04

## 2023-05-24 ENCOUNTER — HOSPITAL ENCOUNTER (OUTPATIENT)
Dept: NON INVASIVE DIAGNOSTICS | Age: 53
Discharge: HOME | End: 2023-05-24
Payer: COMMERCIAL

## 2023-05-24 DIAGNOSIS — I49.9 IRREGULAR HEART BEAT: ICD-10-CM

## 2023-05-24 PROCEDURE — 93246 EXT ECG>7D<15D RECORDING: CPT

## 2023-05-24 PROCEDURE — A9500 TC99M SESTAMIBI: HCPCS | Performed by: NURSE PRACTITIONER

## 2023-05-24 PROCEDURE — 3430000000 HC RX DIAGNOSTIC RADIOPHARMACEUTICAL: Performed by: NURSE PRACTITIONER

## 2023-05-24 PROCEDURE — 93247 EXT ECG>7D<15D SCAN A/R: CPT

## 2023-05-24 PROCEDURE — 78452 HT MUSCLE IMAGE SPECT MULT: CPT

## 2023-05-24 RX ORDER — TETRAKIS(2-METHOXYISOBUTYLISOCYANIDE)COPPER(I) TETRAFLUOROBORATE 1 MG/ML
30 INJECTION, POWDER, LYOPHILIZED, FOR SOLUTION INTRAVENOUS
Status: COMPLETED | OUTPATIENT
Start: 2023-05-24 | End: 2023-05-24

## 2023-05-24 RX ADMIN — Medication 30 MILLICURIE: at 14:01

## 2023-05-25 NOTE — PROCEDURES
[facility], [facility]                              CARDIAC STRESS TEST    PATIENT NAME: Bette Villagomez                      :        1970  MED REC NO:   [id number]                              ROOM:  ACCOUNT NO:   [de-identified]                           ADMIT DATE: 2023  PROVIDER:     Kimberly Lane MD    CARDIOVASCULAR DIAGNOSTIC DEPARTMENT    DATE OF STUDY:  2023    ORDERING PROVIDER:  Alka Corey CNP    PRIMARY CARE PROVIDER:  Alka Corey CNP    INTERPRETING PHYSICIAN:  Juan Espinal. Sonu Lozada MD    EXERCISE MYOCARDIAL PERFUSION STRESS TEST REPORT    Stress/Rest single-isotope SPECT imaging with exercise stress and gated  SPECT imaging    INDICATION:  Assessment of recent chest pain and/or discomfort. CLINICAL HISTORY:    The patient is a 24-year-old woman with no known coronary artery  disease. Previous cardiac history includes:  None. Other previous history includes:  Chest pain, lightheadedness,  indigestion, arthritis, caffeine, smoker, hypertension, family history  of CAD <60 in father. Symptoms just prior to testing included:  None. Relevant medications:  Atenolol. PROCEDURE:  The patient performed treadmill exercise using a Mikey protocol,  completing 6:59 minutes and completing an estimated workload of 7.36  metabolic equivalents (METS). The patient was unable to continue exercise testing due to shortness of  breath and leg pain, and so regadenoson, at a dose of 0.4 mg, was given  in order to optimize cardiac stress imaging. The test was terminated due to fatigue, shortness of breath and leg  fatigue. The heart rate was 65 beats per minute at baseline and increased to 131  beats per minute at peak exercise, which was 78% of the maximum  predicted heart rate. The rest blood pressure was 110/72 mmHg and  increased to 152/72 mmHg, which is a normal response.  During

## 2023-06-08 ENCOUNTER — TELEPHONE (OUTPATIENT)
Dept: ONCOLOGY | Age: 53
End: 2023-06-08

## 2023-06-21 PROBLEM — Z01.419 ENCOUNTER FOR WELL WOMAN EXAM: Status: RESOLVED | Noted: 2023-05-22 | Resolved: 2023-06-21

## 2023-06-29 ENCOUNTER — HOSPITAL ENCOUNTER (OUTPATIENT)
Dept: CT IMAGING | Age: 53
Discharge: HOME | End: 2023-07-01
Payer: MEDICARE

## 2023-06-29 ENCOUNTER — HOSPITAL ENCOUNTER (OUTPATIENT)
Dept: WOMENS IMAGING | Age: 53
Discharge: HOME | End: 2023-07-01
Payer: MEDICARE

## 2023-06-29 DIAGNOSIS — Z12.31 ENCOUNTER FOR MAMMOGRAM TO ESTABLISH BASELINE MAMMOGRAM: ICD-10-CM

## 2023-06-29 DIAGNOSIS — Z87.891 PERSONAL HISTORY OF TOBACCO USE, PRESENTING HAZARDS TO HEALTH: ICD-10-CM

## 2023-06-29 PROCEDURE — 77063 BREAST TOMOSYNTHESIS BI: CPT

## 2023-06-29 PROCEDURE — 71271 CT THORAX LUNG CANCER SCR C-: CPT

## 2023-07-17 ENCOUNTER — OFFICE VISIT (OUTPATIENT)
Dept: GASTROENTEROLOGY | Age: 53
End: 2023-07-17
Payer: MEDICARE

## 2023-07-17 VITALS
DIASTOLIC BLOOD PRESSURE: 72 MMHG | OXYGEN SATURATION: 92 % | SYSTOLIC BLOOD PRESSURE: 112 MMHG | HEIGHT: 67 IN | WEIGHT: 218.2 LBS | BODY MASS INDEX: 34.25 KG/M2 | HEART RATE: 71 BPM

## 2023-07-17 DIAGNOSIS — K21.9 CHRONIC GERD: ICD-10-CM

## 2023-07-17 DIAGNOSIS — R13.10 DYSPHAGIA, UNSPECIFIED TYPE: Primary | ICD-10-CM

## 2023-07-17 PROCEDURE — 99202 OFFICE O/P NEW SF 15 MIN: CPT | Performed by: NURSE PRACTITIONER

## 2023-07-17 PROCEDURE — 4004F PT TOBACCO SCREEN RCVD TLK: CPT | Performed by: NURSE PRACTITIONER

## 2023-07-17 PROCEDURE — G8427 DOCREV CUR MEDS BY ELIG CLIN: HCPCS | Performed by: NURSE PRACTITIONER

## 2023-07-17 PROCEDURE — G8417 CALC BMI ABV UP PARAM F/U: HCPCS | Performed by: NURSE PRACTITIONER

## 2023-07-17 PROCEDURE — 3017F COLORECTAL CA SCREEN DOC REV: CPT | Performed by: NURSE PRACTITIONER

## 2023-07-17 NOTE — PATIENT INSTRUCTIONS
SURVEY:    You may be receiving a survey from [employer] regarding your visit today. You may get this in the mail, through your MyChart, or in your email. Please complete the survey to enable us to provide the highest quality of care to you and your family. If you cannot score us a very good (5 Stars) on any question, please call the office to discuss how we could of made your experience exceptional.    Thank you!     Dr. Caryle Miyamoto, MD [facility] Mares, ANÍBAL-SHELLEY Henry LPN    Phone: [phone]  Fax: [phone]    Office Hours:   M-TH 8-5, F: 8-12

## 2023-07-17 NOTE — PROGRESS NOTES
Patient states they received their endoscopy prep instructions from the physician's office. Patient states they understand medications to be taken with sip of water only the a.m. of procedure. . Patient verbalizes understanding.

## 2023-07-17 NOTE — PROGRESS NOTES
[address]    Chief Complaint   Patient presents with    Abdominal Pain     Patient here today with complaint of Epigastric pain x 2 yrs . Patient has had EGD and esophogram in the past. Patient was seen by GI at [facility] who contributed issues to opoid use. Patient was switched to a patch and issues resolved somewhat but patient is concerned of any other underlying issues. SRINATH Kc is a 48 y.o. old female who has a past medical history of hypertension, hyperlipidemia, anxiety, depression, IBS, presenting for continued, uncontrolled GERD symptoms. Yarely Jade reports worsening symptoms including experiencing \"So much bile, that it will come up into my mouth. \"   Which would lead to what she describes as her entire esophagus will begin burning, at this time she does not treat with cholestyramine but admits that she does not take this routinely as prescribed, and due to constipation issues from the medication. According to Yarely Jade, she has been struggling with GERD and dysphagia symptoms for quite some time. She was initially evaluated at this facility with an EGD showing diffuse superficial erosive gastritis with bile reflux. It was recommended she treat symptoms with cholestyramine and repeat EGD if symptoms have not significantly improved. Today, Sueia Kalie reports that she had a a [place] Esophagram in [place], told she had severe esophageal spasms and to treat with peppermint oil. Review of the esophagram questioned regarding involvement of her intraspinal electrodes as a segment involving the specimen was at the same level. According to Yarely Jade, she has discussed with her pain management physician and he said this is very unlikely. She reports her pain management as [facility] located in [place]. According to Yarely Jade, she attempted to follow-up back at this facility, but due to surgeon no longer practicing, she was seen at the [address] [facility].   At

## 2023-07-20 ASSESSMENT — ENCOUNTER SYMPTOMS
RESPIRATORY NEGATIVE: 1
ABDOMINAL PAIN: 1
TROUBLE SWALLOWING: 1
ALLERGIC/IMMUNOLOGIC NEGATIVE: 1

## 2023-07-24 ENCOUNTER — ANESTHESIA EVENT (OUTPATIENT)
Dept: OPERATING ROOM | Age: 53
End: 2023-07-24
Payer: MEDICARE

## 2023-07-25 ENCOUNTER — ANESTHESIA (OUTPATIENT)
Dept: OPERATING ROOM | Age: 53
End: 2023-07-25
Payer: MEDICARE

## 2023-07-25 ENCOUNTER — HOSPITAL ENCOUNTER (OUTPATIENT)
Age: 53
Setting detail: OUTPATIENT SURGERY
Discharge: HOME | End: 2023-07-25
Attending: INTERNAL MEDICINE | Admitting: INTERNAL MEDICINE
Payer: MEDICARE

## 2023-07-25 VITALS
HEART RATE: 54 BPM | OXYGEN SATURATION: 94 % | BODY MASS INDEX: 33.71 KG/M2 | HEIGHT: 67 IN | RESPIRATION RATE: 16 BRPM | DIASTOLIC BLOOD PRESSURE: 69 MMHG | TEMPERATURE: 98.3 F | SYSTOLIC BLOOD PRESSURE: 152 MMHG | WEIGHT: 214.8 LBS

## 2023-07-25 DIAGNOSIS — R13.10 DYSPHAGIA, UNSPECIFIED TYPE: ICD-10-CM

## 2023-07-25 PROCEDURE — 2500000003 HC RX 250 WO HCPCS: Performed by: NURSE ANESTHETIST, CERTIFIED REGISTERED

## 2023-07-25 PROCEDURE — 88305 TISSUE EXAM BY PATHOLOGIST: CPT

## 2023-07-25 PROCEDURE — 7100000010 HC PHASE II RECOVERY - FIRST 15 MIN: Performed by: INTERNAL MEDICINE

## 2023-07-25 PROCEDURE — 2709999900 HC NON-CHARGEABLE SUPPLY: Performed by: INTERNAL MEDICINE

## 2023-07-25 PROCEDURE — 3700000001 HC ADD 15 MINUTES (ANESTHESIA): Performed by: INTERNAL MEDICINE

## 2023-07-25 PROCEDURE — 43239 EGD BIOPSY SINGLE/MULTIPLE: CPT | Performed by: INTERNAL MEDICINE

## 2023-07-25 PROCEDURE — 43248 EGD GUIDE WIRE INSERTION: CPT | Performed by: INTERNAL MEDICINE

## 2023-07-25 PROCEDURE — 7100000011 HC PHASE II RECOVERY - ADDTL 15 MIN: Performed by: INTERNAL MEDICINE

## 2023-07-25 PROCEDURE — C1769 GUIDE WIRE: HCPCS | Performed by: INTERNAL MEDICINE

## 2023-07-25 PROCEDURE — 3609012400 HC EGD TRANSORAL BIOPSY SINGLE/MULTIPLE: Performed by: INTERNAL MEDICINE

## 2023-07-25 PROCEDURE — 3609017700 HC EGD DILATION GASTRIC/DUODENAL STRICTURE: Performed by: INTERNAL MEDICINE

## 2023-07-25 PROCEDURE — 2580000003 HC RX 258: Performed by: NURSE ANESTHETIST, CERTIFIED REGISTERED

## 2023-07-25 PROCEDURE — 3700000000 HC ANESTHESIA ATTENDED CARE: Performed by: INTERNAL MEDICINE

## 2023-07-25 PROCEDURE — 6360000002 HC RX W HCPCS: Performed by: NURSE ANESTHETIST, CERTIFIED REGISTERED

## 2023-07-25 RX ORDER — SODIUM CHLORIDE, SODIUM LACTATE, POTASSIUM CHLORIDE, CALCIUM CHLORIDE 600; 310; 30; 20 MG/100ML; MG/100ML; MG/100ML; MG/100ML
INJECTION, SOLUTION INTRAVENOUS CONTINUOUS
Status: DISCONTINUED | OUTPATIENT
Start: 2023-07-25 | End: 2023-07-25 | Stop reason: HOSPADM

## 2023-07-25 RX ORDER — PROPOFOL 10 MG/ML
INJECTION, EMULSION INTRAVENOUS PRN
Status: DISCONTINUED | OUTPATIENT
Start: 2023-07-25 | End: 2023-07-25 | Stop reason: SDUPTHER

## 2023-07-25 RX ORDER — LIDOCAINE HYDROCHLORIDE 20 MG/ML
INJECTION, SOLUTION EPIDURAL; INFILTRATION; INTRACAUDAL; PERINEURAL PRN
Status: DISCONTINUED | OUTPATIENT
Start: 2023-07-25 | End: 2023-07-25 | Stop reason: SDUPTHER

## 2023-07-25 RX ADMIN — PROPOFOL 50 MG: 10 INJECTION, EMULSION INTRAVENOUS at 14:40

## 2023-07-25 RX ADMIN — PROPOFOL 50 MG: 10 INJECTION, EMULSION INTRAVENOUS at 14:34

## 2023-07-25 RX ADMIN — PROPOFOL 30 MG: 10 INJECTION, EMULSION INTRAVENOUS at 14:43

## 2023-07-25 RX ADMIN — SODIUM CHLORIDE, POTASSIUM CHLORIDE, SODIUM LACTATE AND CALCIUM CHLORIDE: 600; 310; 30; 20 INJECTION, SOLUTION INTRAVENOUS at 14:27

## 2023-07-25 RX ADMIN — PROPOFOL 50 MG: 10 INJECTION, EMULSION INTRAVENOUS at 14:38

## 2023-07-25 RX ADMIN — PROPOFOL 50 MG: 10 INJECTION, EMULSION INTRAVENOUS at 14:42

## 2023-07-25 RX ADMIN — LIDOCAINE HYDROCHLORIDE 5 ML: 20 INJECTION, SOLUTION EPIDURAL; INFILTRATION; INTRACAUDAL; PERINEURAL at 14:32

## 2023-07-25 RX ADMIN — PROPOFOL 50 MG: 10 INJECTION, EMULSION INTRAVENOUS at 14:36

## 2023-07-25 RX ADMIN — PROPOFOL 50 MG: 10 INJECTION, EMULSION INTRAVENOUS at 14:32

## 2023-07-25 ASSESSMENT — PAIN - FUNCTIONAL ASSESSMENT: PAIN_FUNCTIONAL_ASSESSMENT: 0-10

## 2023-07-25 ASSESSMENT — LIFESTYLE VARIABLES: SMOKING_STATUS: 1

## 2023-07-25 NOTE — OP NOTE
[facility]    EGD    PROCEDURE DATE: 07/25/23    REFERRING PHYSICIAN: No ref. provider found     PRIMARY CARE PROVIDER: ANÍBAL Pollard - CNP    ATTENDING PHYSICIAN: Carri Rucker MD     HISTORY: Ms. Mykel Krishna is a 48 y.o. female who presents to the  Endoscopy unit for upper endoscopy. The patient's clinical history is remarkable for chronic pain. She is currently medically stable and appropriate for the planned procedure. PREOPERATIVE DIAGNOSIS: Dysphagia. PROCEDURES:   1) Transoral Upper Endoscopy. POSTOPERATIVE DIAGNOSIS:  Gastritis     MEDICATIONS: MAC per anesthesia     EBL: 2 ml    INSTRUMENT: Olympus GIF-H190  flexible Gastroscope. PREPARATION: The nature and character of the procedure as well as risks, benefits, and alternatives were discussed with the patient and informed consent was obtained. Complications were said to include, but were not limited to: medication allergy, medication reaction, cardiovascular and respiratory problems, bleeding, perforation, infection, and/or missed diagnosis. Following arrival in the endoscopy room, the patient was placed in the left lateral decubitus position and final time-out accomplished in the presence of the nursing staff. Baseline vital signs were obtained and reviewed, and IV sedation was subsequently initiated. FINDINGS:   Esophagus: The esophagus was inspected to the Z-line. The endoscopic exam showed no Schatzki's ring or lesions. GEJ was regular at 40 cm from the incisor. Stomach: The stomach was inspected in both forward and retroflex fashion and was appropriately distensible. The cardia, fundus, incisura, antrum and pylorus were identified via direct visualization. The endoscopic exam showed mild erythema in the antrum. No ulcers on incisura on retroflex or hiatal hernia. Duodenum: The proximal small bowel was inspected through the bulb, sweep, and second portion of the duodenum.  The endoscopic exam showed no

## 2023-07-25 NOTE — H&P
History and Physical    Patient's Name/Date of Birth: Ernestina Sellers / 1970 (52 y.o.)    MRN: [id number]     Date: July 25, 2023       CHIEF COMPLAINT:    Dysphagia    Lucinda Mariscal is a 48 y.o. old female who has a past medical history of hypertension, hyperlipidemia, anxiety, depression, IBS, presenting for continued, uncontrolled GERD symptoms. [facility] reports worsening symptoms of dysphagia. Her last EGD was on 03/02/2021. EGD and Colonoscopy 03/02/2021  1. Epigastric pain with reflux symptoms. 2.  Loose stools. 3.  Family history of colitis (mother). POSTOPERATIVE DIAGNOSES:  1. Diffuse superficial erosive gastritis with bile reflux. 2.  Diminutive sessile sigmoid polyp (approximately 35 cm from anal  verge). OPERATION PERFORMED:  1. Esophagogastroduodenoscopy. 2.  Prepyloric antral biopsies. 3.  Colonoscopy, anus to cecum. 4.  Sessile sigmoid polypectomy (approximately 35 cm from anal verge). Past Medical History:   Diagnosis Date    Anxiety     Bipolar affective disorder ([address])     Chronic low back pain     Dr. Hui Sa side neurosurgery    Depression     Gastroesophageal reflux disease with esophagitis without hemorrhage     Dr. Jason Gonzales    Hyperlipidemia     Hypertension     Irritable bowel syndrome     Osteoarthritis     Prediabetes     Schizophrenia, schizo-affective type, depressed ([address])     has had ECT tx in past     Past Surgical History:   Procedure Laterality Date    [address]    exploratory lap, ovarian tubal abscess rupture    APPENDECTOMY  1995    CARDIAC CATHETERIZATION  2013    NORMAL    CHOLECYSTECTOMY  1995    COLONOSCOPY  03/02/2021    COLONOSCOPY N/A 3/2/2021    COLONOSCOPY POLYPECTOMY REMOVAL SNARE/STOMA performed by Ellie Khan MD [address] [place] [facility] [place], [address] ([address])  2003    1/2 of right ovary retained    [id number]  01/30/2020    [facility].     TONSILLECTOMY  1974    UPPER GASTROINTESTINAL ENDOSCOPY

## 2023-07-25 NOTE — ANESTHESIA PRE PROCEDURE
Department of Anesthesiology  Preprocedure Note       Name:  Sowmya White   Age:  48 y.o.  :  1970                                          MRN:  [id number]         Date:  2023      Surgeon: Betty Lagos):  Roseann Parrish MD    Procedure: Procedure(s):  EGD ESOPHAGOGASTRODUODENOSCOPY    Medications prior to admission:   Prior to Admission medications    Medication Sig Start Date End Date Taking? Authorizing Provider   atenolol (TENORMIN) 100 MG tablet TAKE ONE TABLET BY MOUTH DAILY 23   ANÍBAL Johnston CNP   pantoprazole (PROTONIX) 40 MG tablet Take 1 tablet by mouth in the morning and at bedtime 23   ANÍBAL Johnston CNP   gabapentin (NEURONTIN) 600 MG tablet Take 1 tablet by mouth 3 times daily. 23   Historical Provider, MD   buprenorphine ([address]) [address]  23   Historical Provider, MD   rosuvastatin (CRESTOR) 40 MG tablet Take 1 tablet by mouth daily 23   ANÍBAL Johnston CNP   EPINEPHrine (EPIPEN 2-SHLOMO) 0.3 MG/0.3ML SOAJ injection Use as directed for allergic reaction  Patient not taking: Reported on 2023   ANÍBAL Johnston CNP   cholestyramine Merilee Melena) 4 g packet Take 1 packet by mouth daily  Patient not taking: Reported on 2023 10/11/22   ANÍBAL Johnston CNP   sucralfate (CARAFATE) 1 GM tablet Take 1 tablet by mouth 4 times daily 21  ANÍBAL Johnston CNP   HYDROcodone-acetaminophen (NORCO)  MG per tablet Take 1 tablet by mouth 3 times daily. Patient not taking: Reported on 2023   Historical Provider, MD   sertraline (ZOLOFT) 100 MG tablet Take 1 tablet by mouth daily    Historical Provider, MD   iloperidone (FANAPT) 6 MG tablet Take 0.5 tablets by mouth 2 times daily    Historical Provider, MD       Current medications:    No current facility-administered medications for this visit. No current outpatient medications on file.

## 2023-07-25 NOTE — PROGRESS NOTES
Patient verbalizes readiness for discharge at this time. Discharge Criteria    Inpatients must meet Criteria 1 through 7. All other patients are either YES or N/A. If a NO is chosen then Anesthesia or Surgeon must be notified. 1.  Minimum 30 minutes after last dose of sedative medication. Yes      2. Systolic BP between 90 - 533. Diastolic BP between 60 - 90. Yes      3. Pulse between 60 - 120    Yes      4. Respirations between 8 - 25. Yes      5. SpO2 92% - 100%. Yes      6. Able to cough and swallow or return to baseline function. Yes      7. Alert and oriented or return to baseline mental status. Yes      8. Demonstrates controlled, coordinated movements, ambulates with steady gait, or return to baseline activity function. Yes      9. Minimal or no pain or nausea, or at a level tolerable and acceptable to patient. Yes      10. Takes and retains oral fluids as allowed. Yes      11. Procedural / perioperative site stable. Minimal or no bleeding. Yes          12. If GI endoscopy procedure, minimal or no abdominal distention or passing flatus. Yes      13. Written discharge instructions and emergency telephone number provided. Yes      14. Accompanied by a responsible adult.     Yes

## 2023-07-25 NOTE — ANESTHESIA POSTPROCEDURE EVALUATION
Department of Anesthesiology  Postprocedure Note    Patient: Tracy Kc  MRN: [id number]  YOB: 1970  Date of evaluation: 7/25/2023      Procedure Summary     Date: 07/25/23 Room / Location: [address] [place] [address]    Anesthesia Start: 1430 Anesthesia Stop: 1239    Procedures:       EGD BIOPSY      EGD ESOPHAGOGASTRODUODENOSCOPY DILATATION Diagnosis:       Dysphagia, unspecified type      (Dysphagia, unspecified type [R13.10])    Surgeons: Terence Gutierrez MD Responsible Provider: ANÍBAL Chavis CRNA    Anesthesia Type: general, TIVA ASA Status: 3          Anesthesia Type: No value filed.     Allison Phase I: Allison Score: 10    Allison Phase II: Allison Score: 10      Anesthesia Post Evaluation    Patient location during evaluation: PACU  Patient participation: complete - patient participated  Level of consciousness: awake  Airway patency: patent  Nausea & Vomiting: no vomiting and no nausea  Complications: no  Cardiovascular status: blood pressure returned to baseline and hemodynamically stable  Respiratory status: acceptable, spontaneous ventilation and room air  Hydration status: stable

## 2023-07-28 LAB — SURGICAL PATHOLOGY REPORT: NORMAL

## 2023-07-31 ENCOUNTER — TELEPHONE (OUTPATIENT)
Dept: GASTROENTEROLOGY | Age: 53
End: 2023-07-31

## 2023-07-31 NOTE — TELEPHONE ENCOUNTER
Patient aware of results. Is currently taking Protonix, Cholestyramine and Carafate with no relief. Wanted to know if there was anything she should take to help with reflux. Is making dietary changes.

## 2023-07-31 NOTE — TELEPHONE ENCOUNTER
----- Message from Sofiya Marie MD sent at 7/29/2023 11:37 PM EDT -----  Please notify patient: No H.pylori infection, no eosinophilic esophagitis. Inflammation at GEJ likely related to acid reflux.

## 2023-12-08 ENCOUNTER — HOSPITAL ENCOUNTER (OUTPATIENT)
Dept: NUTRITION | Age: 53
Discharge: HOME | End: 2023-12-08
Payer: MEDICARE

## 2023-12-08 PROCEDURE — 97802 MEDICAL NUTRITION INDIV IN: CPT

## 2023-12-08 NOTE — PROGRESS NOTES
MNT provided for ***    {NUTRITION PROBLEM LIST:70257::\"***\"} related to {NUTRITION ETIOLOGY LIST:22022::\"***\"} as evidenced by {NUTRITION SIGNS SYMPTOMS:21037::\"***\"}    Client data    Ht: ***\" Wt: ***# BMI: *** (***)   Weight changes: *** CBW: *** kg   BMR: *** calories Est. total calorie needs: ~***       Client overall goal for weight is for ***. Current eating pattern is ***. Use of whole grains, whole fruits and vegetables appear *** than recommended quantities. There is an excess of *** per recall. Activity is ***. Client presents for MNT today with ***. Client was educated on carbohydrate counting with the following goals at meals and snacks:  Breakfast: *** grams  Lunch: *** grams  Supper: *** grams  Bedtime Snack: *** grams    Client received information on limiting fat in diet to lessen heart disease risk, with goals of: Total Fat: *** grams  Saturated Fat: *** grams  Trans Fat: 0 grams daily     Discussed and provided literature on:  ***    Client goals:  ***    Expected compliance:  ***  Client appears to be in {desc; stages of change:41433} phase of change. Recommend follow up as needed. RD name and phone number provided. Thank you for the referral.    Electronically signed by Adelina Holt RD, LD on 12/8/2023 at 5:33 PM    Education session duration: *** minutes.

## 2024-02-23 ENCOUNTER — HOSPITAL ENCOUNTER (OUTPATIENT)
Age: 54
Setting detail: SPECIMEN
Discharge: HOME | End: 2024-02-23

## 2024-02-23 DIAGNOSIS — R30.0 DYSURIA: ICD-10-CM

## 2024-02-23 LAB
BACTERIA URNS QL MICRO: ABNORMAL
BILIRUB UR QL STRIP: NEGATIVE
CASTS #/AREA URNS LPF: ABNORMAL /LPF (ref 0–8)
CLARITY UR: ABNORMAL
COLOR UR: YELLOW
EPI CELLS #/AREA URNS HPF: ABNORMAL /HPF (ref 0–5)
GLUCOSE UR STRIP-MCNC: NEGATIVE MG/DL
HGB UR QL STRIP.AUTO: NEGATIVE
KETONES UR STRIP-MCNC: NEGATIVE MG/DL
LEUKOCYTE ESTERASE UR QL STRIP: ABNORMAL
NITRITE UR QL STRIP: NEGATIVE
PH UR STRIP: 5.5 [PH] (ref 5–8)
PROT UR STRIP-MCNC: NEGATIVE MG/DL
RBC #/AREA URNS HPF: ABNORMAL /HPF (ref 0–4)
SP GR UR STRIP: 1.02 (ref 1–1.03)
UROBILINOGEN UR STRIP-ACNC: NORMAL EU/DL (ref 0–1)
WBC #/AREA URNS HPF: ABNORMAL /HPF (ref 0–5)

## 2024-02-25 LAB
MICROORGANISM SPEC CULT: ABNORMAL
SPECIMEN DESCRIPTION: ABNORMAL

## 2024-04-09 ENCOUNTER — OFFICE VISIT (OUTPATIENT)
Dept: OBGYN | Age: 54
End: 2024-04-09
Payer: COMMERCIAL

## 2024-04-09 ENCOUNTER — HOSPITAL ENCOUNTER (OUTPATIENT)
Age: 54
Setting detail: SPECIMEN
Discharge: HOME | End: 2024-04-09
Payer: COMMERCIAL

## 2024-04-09 VITALS
SYSTOLIC BLOOD PRESSURE: 110 MMHG | BODY MASS INDEX: 34.84 KG/M2 | WEIGHT: 222 LBS | HEIGHT: 67 IN | DIASTOLIC BLOOD PRESSURE: 70 MMHG

## 2024-04-09 DIAGNOSIS — R10.2 PELVIC PAIN: ICD-10-CM

## 2024-04-09 DIAGNOSIS — R19.03 RIGHT LOWER QUADRANT ABDOMINAL SWELLING, MASS AND LUMP: ICD-10-CM

## 2024-04-09 DIAGNOSIS — R39.89 BLADDER PAIN: ICD-10-CM

## 2024-04-09 DIAGNOSIS — R32 INCONTINENCE IN FEMALE: Primary | ICD-10-CM

## 2024-04-09 DIAGNOSIS — R32 INCONTINENCE IN FEMALE: ICD-10-CM

## 2024-04-09 DIAGNOSIS — N81.11 MIDLINE CYSTOCELE: ICD-10-CM

## 2024-04-09 LAB
BACTERIA URNS QL MICRO: ABNORMAL
BILIRUB UR QL STRIP: NEGATIVE
CANCER AG125 SERPL-ACNC: 7 U/ML (ref 0–38)
CLARITY UR: CLEAR
COLOR UR: YELLOW
EPI CELLS #/AREA URNS HPF: ABNORMAL /HPF (ref 0–25)
GLUCOSE UR STRIP-MCNC: NEGATIVE MG/DL
HGB UR QL STRIP.AUTO: NEGATIVE
KETONES UR STRIP-MCNC: ABNORMAL MG/DL
LEUKOCYTE ESTERASE UR QL STRIP: NEGATIVE
MUCOUS THREADS URNS QL MICRO: ABNORMAL
NITRITE UR QL STRIP: NEGATIVE
PH UR STRIP: 6 [PH] (ref 5–9)
PROT UR STRIP-MCNC: NEGATIVE MG/DL
RBC #/AREA URNS HPF: ABNORMAL /HPF (ref 0–2)
SP GR UR STRIP: 1.02 (ref 1.01–1.02)
UROBILINOGEN UR STRIP-ACNC: NORMAL EU/DL (ref 0–1)
WBC #/AREA URNS HPF: ABNORMAL /HPF (ref 0–5)

## 2024-04-09 PROCEDURE — PBSHW CA 125: Performed by: ADVANCED PRACTICE MIDWIFE

## 2024-04-09 PROCEDURE — G8427 DOCREV CUR MEDS BY ELIG CLIN: HCPCS | Performed by: ADVANCED PRACTICE MIDWIFE

## 2024-04-09 PROCEDURE — 3017F COLORECTAL CA SCREEN DOC REV: CPT | Performed by: ADVANCED PRACTICE MIDWIFE

## 2024-04-09 PROCEDURE — 87086 URINE CULTURE/COLONY COUNT: CPT

## 2024-04-09 PROCEDURE — 4004F PT TOBACCO SCREEN RCVD TLK: CPT | Performed by: ADVANCED PRACTICE MIDWIFE

## 2024-04-09 PROCEDURE — G8417 CALC BMI ABV UP PARAM F/U: HCPCS | Performed by: ADVANCED PRACTICE MIDWIFE

## 2024-04-09 PROCEDURE — 81001 URINALYSIS AUTO W/SCOPE: CPT

## 2024-04-09 PROCEDURE — 36415 COLL VENOUS BLD VENIPUNCTURE: CPT | Performed by: ADVANCED PRACTICE MIDWIFE

## 2024-04-09 PROCEDURE — 86304 IMMUNOASSAY TUMOR CA 125: CPT

## 2024-04-09 PROCEDURE — 99213 OFFICE O/P EST LOW 20 MIN: CPT | Performed by: ADVANCED PRACTICE MIDWIFE

## 2024-04-09 NOTE — PROGRESS NOTES
PROBLEM VISIT     Date of service: 2024    Grazyna Rojas  Is a 54 y.o. , female    PT's PCP is: Alexis Maldonado APRN - CNP     : 1970                                             Subjective:       No LMP recorded (lmp unknown). Patient has had a hysterectomy.   OB History    Para Term  AB Living   3 3 2     2   SAB IAB Ectopic Molar Multiple Live Births             2      # Outcome Date GA Lbr Tima/2nd Weight Sex Delivery Anes PTL Lv   3 Term 96 42w0d   M Vag-Spont None N DARRYL      Complications: Cord around body   2 Para 92 42w0d   F Vag-Spont None N DARRYL   1 Term                 Social History     Tobacco Use   Smoking Status Every Day    Current packs/day: 1.00    Average packs/day: 1 pack/day for 23.0 years (23.0 ttl pk-yrs)    Types: Cigarettes   Smokeless Tobacco Never        Social History     Substance and Sexual Activity   Alcohol Use Not Currently       Allergies: Bee venom, Sulfa antibiotics, and Ciprofloxacin hcl      Current Outpatient Medications:     traZODone (DESYREL) 100 MG tablet, Take 1 tablet by mouth nightly, Disp: , Rfl:     atenolol (TENORMIN) 100 MG tablet, TAKE 1 TABLET BY MOUTH DAILY, Disp: 90 tablet, Rfl: 1    cholestyramine (QUESTRAN) 4 g packet, Take 1 packet by mouth daily, Disp: 90 packet, Rfl: 1    pantoprazole (PROTONIX) 40 MG tablet, Take 1 tablet by mouth in the morning and at bedtime, Disp: 180 tablet, Rfl: 1    rosuvastatin (CRESTOR) 40 MG tablet, TAKE ONE TABLET BY MOUTH DAILY, Disp: 90 tablet, Rfl: 1    gabapentin (NEURONTIN) 600 MG tablet, Take 1 tablet by mouth 3 times daily., Disp: , Rfl:     buprenorphine (BUPRENEX) 15 MCG/HR PTWK, , Disp: , Rfl:     sertraline (ZOLOFT) 100 MG tablet, Take 1 tablet by mouth daily, Disp: , Rfl:     iloperidone (FANAPT) 6 MG tablet, Take 0.5 tablets by mouth 2 times daily, Disp: , Rfl:     traZODone (DESYREL) 50 MG tablet, Take 1 tablet by mouth nightly (Patient not taking: Reported on

## 2024-04-10 LAB
MICROORGANISM SPEC CULT: NO GROWTH
SPECIMEN DESCRIPTION: NORMAL

## 2024-04-24 ENCOUNTER — HOSPITAL ENCOUNTER (OUTPATIENT)
Age: 54
Setting detail: SPECIMEN
Discharge: HOME | End: 2024-04-24
Payer: MEDICARE

## 2024-04-24 ENCOUNTER — OFFICE VISIT (OUTPATIENT)
Dept: UROLOGY | Age: 54
End: 2024-04-24
Payer: MEDICARE

## 2024-04-24 VITALS
WEIGHT: 219 LBS | BODY MASS INDEX: 34.3 KG/M2 | SYSTOLIC BLOOD PRESSURE: 114 MMHG | DIASTOLIC BLOOD PRESSURE: 72 MMHG | TEMPERATURE: 97.5 F

## 2024-04-24 DIAGNOSIS — N95.2 VAGINAL ATROPHY: ICD-10-CM

## 2024-04-24 DIAGNOSIS — N39.46 MIXED INCONTINENCE: ICD-10-CM

## 2024-04-24 DIAGNOSIS — N39.46 MIXED INCONTINENCE: Primary | ICD-10-CM

## 2024-04-24 LAB
BACTERIA URNS QL MICRO: ABNORMAL
BILIRUB UR QL STRIP: NEGATIVE
CLARITY UR: CLEAR
COLOR UR: YELLOW
EPI CELLS #/AREA URNS HPF: ABNORMAL /HPF (ref 0–25)
GLUCOSE UR STRIP-MCNC: NEGATIVE MG/DL
HGB UR QL STRIP.AUTO: NEGATIVE
KETONES UR STRIP-MCNC: NEGATIVE MG/DL
LEUKOCYTE ESTERASE UR QL STRIP: NEGATIVE
MUCOUS THREADS URNS QL MICRO: ABNORMAL
NITRITE UR QL STRIP: NEGATIVE
PH UR STRIP: 6 [PH] (ref 5–9)
PROT UR STRIP-MCNC: NEGATIVE MG/DL
RBC #/AREA URNS HPF: ABNORMAL /HPF (ref 0–2)
SP GR UR STRIP: 1.02 (ref 1.01–1.02)
UROBILINOGEN UR STRIP-ACNC: NORMAL EU/DL (ref 0–1)
WBC #/AREA URNS HPF: ABNORMAL /HPF (ref 0–5)

## 2024-04-24 PROCEDURE — 4004F PT TOBACCO SCREEN RCVD TLK: CPT | Performed by: UROLOGY

## 2024-04-24 PROCEDURE — 99204 OFFICE O/P NEW MOD 45 MIN: CPT | Performed by: UROLOGY

## 2024-04-24 PROCEDURE — 87086 URINE CULTURE/COLONY COUNT: CPT

## 2024-04-24 PROCEDURE — 51798 US URINE CAPACITY MEASURE: CPT | Performed by: UROLOGY

## 2024-04-24 PROCEDURE — 81001 URINALYSIS AUTO W/SCOPE: CPT

## 2024-04-24 PROCEDURE — G8427 DOCREV CUR MEDS BY ELIG CLIN: HCPCS | Performed by: UROLOGY

## 2024-04-24 PROCEDURE — 3017F COLORECTAL CA SCREEN DOC REV: CPT | Performed by: UROLOGY

## 2024-04-24 PROCEDURE — PBSHW PR MEAS POST-VOIDING RESIDUAL URINE&/BLADDER CAP: Performed by: UROLOGY

## 2024-04-24 PROCEDURE — G8417 CALC BMI ABV UP PARAM F/U: HCPCS | Performed by: UROLOGY

## 2024-04-24 RX ORDER — ESTRADIOL 0.1 MG/G
1 CREAM VAGINAL DAILY
Qty: 1 EACH | Refills: 3 | Status: SHIPPED | OUTPATIENT
Start: 2024-04-24

## 2024-04-24 ASSESSMENT — ENCOUNTER SYMPTOMS
NAUSEA: 0
WHEEZING: 0
CONSTIPATION: 0
SHORTNESS OF BREATH: 0
BACK PAIN: 0
APNEA: 0
ABDOMINAL PAIN: 0
EYE REDNESS: 0
COLOR CHANGE: 0
COUGH: 0
VOMITING: 0

## 2024-04-24 NOTE — PROGRESS NOTES
HPI:        Patient is a 54 y.o. female in no acute distress.  She is alert and oriented to person, place, and time.        .  Patient being seen back here today as a new patient.  Patient was referred to us by ANÍBAL Louis.  Patient was referred to us for significant lower urinary tract symptoms.  This also includes mixed incontinence.  Patient does feel she has issues with double voiding.  She is having nocturia x 1.  Patient does have a daily bowel movement most days but does have occasional constipation.  Patient does have a pain stimulator for low back pain.  Patient does report to drinking 1 cup of coffee and 1 Pepsi per day.  We did discuss bladder irritants with her today.  She will continue to limit these.  Patient has no pain today.  Patient has never seen urology in the past.  She has been having issues with urinary incontinence for a number of years.  Patient reports no pain today.    Past Medical History:   Diagnosis Date    Anxiety     Bipolar affective disorder (HCC)     Chronic low back pain     Dr. Fuller side neurosurgery    Depression     Gastroesophageal reflux disease with esophagitis without hemorrhage     Dr. Guillermo    Hyperlipidemia     Hypertension     Irritable bowel syndrome     Osteoarthritis     Prediabetes     Schizophrenia, schizo-affective type, depressed (HCC)     has had ECT tx in past     Past Surgical History:   Procedure Laterality Date    ABDOMEN SURGERY  1988    exploratory lap, ovarian tubal abscess rupture    APPENDECTOMY  1995    CARDIAC CATHETERIZATION  2013    NORMAL    CHOLECYSTECTOMY  1995    COLONOSCOPY  03/02/2021    COLONOSCOPY N/A 03/02/2021    COLONOSCOPY POLYPECTOMY REMOVAL SNARE/STOMA performed by Gino Zapata MD at [facility] OR    ESOPHAGOGASTRODUODENOSCOPY  07/25/2023    -bx,dilation    HYSTERECTOMY, TOTAL ABDOMINAL (CERVIX REMOVED)  2003 1/2 of right ovary retained    SPINAL CORD STIMULATOR SURGERY  01/30/2020    [facility].

## 2024-04-25 LAB
MICROORGANISM SPEC CULT: NORMAL
SPECIMEN DESCRIPTION: NORMAL

## 2024-04-26 ENCOUNTER — TELEPHONE (OUTPATIENT)
Dept: UROLOGY | Age: 54
End: 2024-04-26

## 2024-04-26 NOTE — TELEPHONE ENCOUNTER
----- Message from ANÍBAL Naidu - CNP sent at 4/26/2024  8:29 AM EDT -----  Call pt - urine cx reviewed and negative for UTI & for significant microhematuria

## 2024-05-09 ENCOUNTER — PROCEDURE VISIT (OUTPATIENT)
Dept: UROLOGY | Age: 54
End: 2024-05-09
Payer: MEDICARE

## 2024-05-09 VITALS
BODY MASS INDEX: 34.93 KG/M2 | DIASTOLIC BLOOD PRESSURE: 72 MMHG | WEIGHT: 223 LBS | SYSTOLIC BLOOD PRESSURE: 116 MMHG | TEMPERATURE: 97.8 F

## 2024-05-09 DIAGNOSIS — N95.2 VAGINAL ATROPHY: ICD-10-CM

## 2024-05-09 DIAGNOSIS — N39.46 MIXED INCONTINENCE: Primary | ICD-10-CM

## 2024-05-09 PROCEDURE — 99214 OFFICE O/P EST MOD 30 MIN: CPT | Performed by: UROLOGY

## 2024-05-09 PROCEDURE — 3017F COLORECTAL CA SCREEN DOC REV: CPT | Performed by: UROLOGY

## 2024-05-09 PROCEDURE — 4004F PT TOBACCO SCREEN RCVD TLK: CPT | Performed by: UROLOGY

## 2024-05-09 PROCEDURE — G8417 CALC BMI ABV UP PARAM F/U: HCPCS | Performed by: UROLOGY

## 2024-05-09 PROCEDURE — 52000 CYSTOURETHROSCOPY: CPT | Performed by: UROLOGY

## 2024-05-09 PROCEDURE — G8427 DOCREV CUR MEDS BY ELIG CLIN: HCPCS | Performed by: UROLOGY

## 2024-05-09 NOTE — PROGRESS NOTES
During cystoscopy the following was utilized on patient with no adverse affects:    45% SODIUM CHLORIDE 500 ML BAG  Lot number: [id number]  Expiration date: 05/2025      LIDOCAINE HYDROCHLORIDE JELLY 2%   Lot number: [id number]  Expiration date: 08/2025     CYSTOSCOPE   Lot number: [id number]  Expiration date: 06/28/2026

## 2024-05-09 NOTE — PROGRESS NOTES
HPI:        Patient is a 54 y.o. female in no acute distress.  She is alert and oriented to person, place, and time.        History   Patient being seen back here today as a new patient.  Patient was referred to us by ANÍBAL Louis.  Patient was referred to us for significant lower urinary tract symptoms.  This also includes mixed incontinence.  Patient does feel she has issues with double voiding.  She is having nocturia x 1.  Patient does have a daily bowel movement most days but does have occasional constipation.  Patient does have a pain stimulator for low back pain.  Patient does report to drinking 1 cup of coffee and 1 Pepsi per day.  We did discuss bladder irritants with her today.  She will continue to limit these.  Patient has no pain today.  Patient has never seen urology in the past.  She has been having issues with urinary incontinence for a number of years.  Patient reports no pain today.     Currently  Patient is here today for lower tract visualization.  She is also here for pelvic examination.  At the last visit we did start her on vaginal estrogen.  Patient's symptoms have not changed months since last visit.  Patient does report that she is able to use the vaginal estrogen.  She is doing well otherwise.  No recent gross hematuria or dysuria.  No pain.    Cystoscopy Procedure Note    Pre-operative Diagnosis: Mixed incontinence    Post-operative Diagnosis: Same     Surgeon: Issac    Assistants: None    Anesthesia : Local    Procedure Details   The risks, benefits, complications, treatment options, and expected outcomes were discussed with the patient. The patient concurred with the proposed plan, giving informed consent.    Cystoscopy was performed today under local anesthesia, using sterile technique. The patient was placed in the dorsal lithotomy position, prepped with CHG, and draped in the usual sterile fashion. A 14 [place] flexible cystoscope was used to systematically inspect  79

## 2024-06-07 ENCOUNTER — HOSPITAL ENCOUNTER (OUTPATIENT)
Age: 54
End: 2024-06-07
Payer: MEDICARE

## 2024-06-07 ENCOUNTER — HOSPITAL ENCOUNTER (OUTPATIENT)
Dept: GENERAL RADIOLOGY | Age: 54
End: 2024-06-07
Payer: MEDICARE

## 2024-06-07 DIAGNOSIS — J20.9 ACUTE BRONCHITIS, UNSPECIFIED ORGANISM: ICD-10-CM

## 2024-06-07 PROCEDURE — 71046 X-RAY EXAM CHEST 2 VIEWS: CPT

## 2024-06-11 ENCOUNTER — PROCEDURE VISIT (OUTPATIENT)
Dept: UROLOGY | Age: 54
End: 2024-06-11
Payer: MEDICARE

## 2024-06-11 VITALS
WEIGHT: 217 LBS | SYSTOLIC BLOOD PRESSURE: 110 MMHG | BODY MASS INDEX: 33.99 KG/M2 | TEMPERATURE: 97.6 F | DIASTOLIC BLOOD PRESSURE: 70 MMHG | HEART RATE: 85 BPM

## 2024-06-11 DIAGNOSIS — M62.81 MUSCLE WEAKNESS: ICD-10-CM

## 2024-06-11 DIAGNOSIS — N39.46 MIXED INCONTINENCE: Primary | ICD-10-CM

## 2024-06-11 PROCEDURE — 91122 PR ANORECTAL MANOMETRY: CPT | Performed by: PHYSICIAN ASSISTANT

## 2024-06-11 PROCEDURE — 97032 APPL MODALITY 1+ESTIM EA 15: CPT | Performed by: PHYSICIAN ASSISTANT

## 2024-06-11 PROCEDURE — 51784 ANAL/URINARY MUSCLE STUDY: CPT | Performed by: PHYSICIAN ASSISTANT

## 2024-06-11 NOTE — PROGRESS NOTES
INITIAL PELVIC FLOOR REHAB INTAKE    Symptoms  Daytime voiding frequency: random   Nighttime voiding frequency: Nocturia x2  Urgency: severe  Incontinence episodes per day: 5-6, Causes: coughing,bending,laughing,intercourse,sneezing   Number of pads used per day: 5-7  Pelvic pain: yes - she has had pelvic pain  Pain with intercourse: yes - sometimes  Bowel habits: abnormal , Fecal incontinence: no    Pertinent History  Previous pelvic surgery: yes - ovarian tubule absest   Number vaginal deliveries: 2 Episiotomy: No  Number c-sections: 0  Urology medications/diuretics: she has the vaginal cream    Intake of spicy/citrus/tomato based foods: occasional  Caffeine intake per day: 1 cup of coffee and small can pepsi  Fluid intake per day: 1200 water  Alcohol intake: no  Smoking: Yes    Contraindications  Pacemaker: No  Pregnant/trying to conceive: No  Metal IUD? No  Unstable seizure disorder: No  Active UTI: UACS from reviewed and negative    Was able to hold for 6-7 seconds at moderate wavy amplitude.   Fatigue after 6 repetitions.     Home exercise regimen prescribed:   Repetitions - 6   Contract - 6 sec   Relax - 10 sec   + 10 Quick Flicks  Frequency- 4 times daily    Stimulated with 28 average mA for 8 minutes.

## 2024-06-21 ENCOUNTER — HOSPITAL ENCOUNTER (OUTPATIENT)
Dept: CT IMAGING | Age: 54
End: 2024-06-21
Payer: MEDICARE

## 2024-06-21 ENCOUNTER — HOSPITAL ENCOUNTER (OUTPATIENT)
Age: 54
Discharge: HOME | End: 2024-06-21
Payer: MEDICARE

## 2024-06-21 DIAGNOSIS — R06.02 SHORTNESS OF BREATH: ICD-10-CM

## 2024-06-21 DIAGNOSIS — J20.9 ACUTE BRONCHITIS, UNSPECIFIED ORGANISM: ICD-10-CM

## 2024-06-21 PROCEDURE — 71250 CT THORAX DX C-: CPT

## 2024-06-26 ENCOUNTER — PROCEDURE VISIT (OUTPATIENT)
Dept: UROLOGY | Age: 54
End: 2024-06-26
Payer: MEDICARE

## 2024-06-26 VITALS
WEIGHT: 220 LBS | TEMPERATURE: 98.4 F | BODY MASS INDEX: 34.46 KG/M2 | HEART RATE: 79 BPM | DIASTOLIC BLOOD PRESSURE: 70 MMHG | SYSTOLIC BLOOD PRESSURE: 110 MMHG

## 2024-06-26 DIAGNOSIS — M62.81 MUSCLE WEAKNESS: ICD-10-CM

## 2024-06-26 DIAGNOSIS — N39.46 MIXED INCONTINENCE: Primary | ICD-10-CM

## 2024-06-26 PROCEDURE — 51784 ANAL/URINARY MUSCLE STUDY: CPT | Performed by: PHYSICIAN ASSISTANT

## 2024-06-26 PROCEDURE — 97750 PHYSICAL PERFORMANCE TEST: CPT | Performed by: PHYSICIAN ASSISTANT

## 2024-06-26 PROCEDURE — 97032 APPL MODALITY 1+ESTIM EA 15: CPT | Performed by: PHYSICIAN ASSISTANT

## 2024-06-26 PROCEDURE — 91122 PR ANORECTAL MANOMETRY: CPT | Performed by: PHYSICIAN ASSISTANT

## 2024-06-26 NOTE — PROGRESS NOTES
PELVIC FLOOR REHAB FOLLOW UP    At last visit (PFR # 2, 1 weeks ago):    Was able to hold for 6-7 seconds at moderate wavy amplitude.   Fatigue after 6 repetitions.      Home exercise regimen prescribed:   Repetitions - 6   Contract - 6 sec   Relax - 10 sec   + 10 Quick Flicks  Frequency- 4 times daily     Stimulated with 28 average mA for 8 minutes.     Symptoms  Daytime voiding frequency: q 2 hrs, decreased  Nighttime voiding frequency: 1 times per night, decreased  Urgency: moderate, decreased    Incontinence episodes per day: 2, Causes: coughing, sneezing,laughing ,bending unchanged  Number of pads used per day: 2, decreased    Pelvic pain: unchanged  Pain with intercourse unchanged    Bowel habits: unchanged  Fecal incontinence: unchanged    OVERALL IMPROVEMENT SINCE INITIAL SESSION:   mild in degree    Compliance:  Has pt completed exercises as instructed: Yes    Changes Since Initial Visit  Intake of spicy/citrus/tomato based foods: unchanged  Caffeine intake per day: unchanged  Fluid intake per day: unchanged  Alcohol intake: unchanged  Smoking: unchanged    Was able to hold for 7-8 seconds at moderate wavy amplitude.   Fatigue after 8 repetitions.     Home exercise regimen prescribed:   Repetitions - 8  Contract - 8 sec   Relax - 10 sec   + 10 Quick Flicks  Frequency- 4 times daily    Stimulated with 26 average mA for 10 minutes.

## 2024-07-03 ENCOUNTER — PROCEDURE VISIT (OUTPATIENT)
Dept: UROLOGY | Age: 54
End: 2024-07-03
Payer: MEDICARE

## 2024-07-03 VITALS
SYSTOLIC BLOOD PRESSURE: 140 MMHG | TEMPERATURE: 97.6 F | HEART RATE: 74 BPM | BODY MASS INDEX: 34.93 KG/M2 | WEIGHT: 223 LBS | DIASTOLIC BLOOD PRESSURE: 70 MMHG

## 2024-07-03 DIAGNOSIS — M62.81 MUSCLE WEAKNESS: Primary | ICD-10-CM

## 2024-07-03 DIAGNOSIS — N39.46 MIXED INCONTINENCE: ICD-10-CM

## 2024-07-03 PROCEDURE — 97032 APPL MODALITY 1+ESTIM EA 15: CPT | Performed by: PHYSICIAN ASSISTANT

## 2024-07-03 PROCEDURE — 91122 PR ANORECTAL MANOMETRY: CPT | Performed by: PHYSICIAN ASSISTANT

## 2024-07-03 PROCEDURE — 51784 ANAL/URINARY MUSCLE STUDY: CPT | Performed by: PHYSICIAN ASSISTANT

## 2024-07-03 NOTE — PROGRESS NOTES
PELVIC FLOOR REHAB FOLLOW UP    At last visit (PFR # 3, 1 weeks ago):    Was able to hold for 7-8 seconds at moderate wavy amplitude.   Fatigue after 8 repetitions.      Home exercise regimen prescribed:   Repetitions - 8  Contract - 8 sec   Relax - 10 sec   + 10 Quick Flicks  Frequency- 4 times daily     Stimulated with 26 average mA for 10 minutes.     Symptoms  Daytime voiding frequency: q 2 hrs, unchanged  Nighttime voiding frequency: 1 times per night, unchanged  Urgency: mild, decreased    Incontinence episodes per day: 1, Causes: , unchangedunchanged  Number of pads used per day: 1, decreased    Pelvic pain: unchanged  Pain with intercourse unchanged    Bowel habits: unchanged  Fecal incontinence: unchanged    OVERALL IMPROVEMENT SINCE INITIAL SESSION:   mild in degree    Compliance:  Has pt completed exercises as instructed: Yes    Changes Since Initial Visit  Intake of spicy/citrus/tomato based foods: unchanged  Caffeine intake per day: unchanged  Fluid intake per day: unchanged  Alcohol intake: unchanged  Smoking: unchanged    Was able to hold for 8-9 seconds at moderate wavy amplitude.   Fatigue after 9 repetitions.     Home exercise regimen prescribed:   Repetitions - 9   Contract - 9 sec   Relax - 10 sec   + 10 Quick Flicks  Frequency- 4 times daily    Stimulated with 18 average mA for 15 minutes.

## 2024-07-11 ENCOUNTER — HOSPITAL ENCOUNTER (OUTPATIENT)
Dept: PULMONOLOGY | Age: 54
Discharge: HOME | End: 2024-07-11
Payer: MEDICARE

## 2024-07-11 DIAGNOSIS — R06.02 SHORTNESS OF BREATH: ICD-10-CM

## 2024-07-11 DIAGNOSIS — J20.9 ACUTE BRONCHITIS, UNSPECIFIED ORGANISM: ICD-10-CM

## 2024-07-11 PROCEDURE — 6370000000 HC RX 637 (ALT 250 FOR IP): Performed by: INTERNAL MEDICINE

## 2024-07-11 PROCEDURE — 94060 EVALUATION OF WHEEZING: CPT

## 2024-07-11 PROCEDURE — 94729 DIFFUSING CAPACITY: CPT

## 2024-07-11 PROCEDURE — 94726 PLETHYSMOGRAPHY LUNG VOLUMES: CPT

## 2024-07-11 PROCEDURE — 94664 DEMO&/EVAL PT USE INHALER: CPT

## 2024-07-11 RX ORDER — ALBUTEROL SULFATE 90 UG/1
4 AEROSOL, METERED RESPIRATORY (INHALATION) ONCE
Status: COMPLETED | OUTPATIENT
Start: 2024-07-11 | End: 2024-07-11

## 2024-07-11 RX ADMIN — ALBUTEROL SULFATE 4 PUFF: 90 AEROSOL, METERED RESPIRATORY (INHALATION) at 13:29

## 2024-07-15 ENCOUNTER — HOSPITAL ENCOUNTER (OUTPATIENT)
Dept: WOMENS IMAGING | Age: 54
Discharge: HOME | End: 2024-07-17
Payer: COMMERCIAL

## 2024-07-15 DIAGNOSIS — Z12.31 SCREENING MAMMOGRAM FOR HIGH-RISK PATIENT: ICD-10-CM

## 2024-07-15 PROCEDURE — 77063 BREAST TOMOSYNTHESIS BI: CPT

## 2024-07-17 ENCOUNTER — PROCEDURE VISIT (OUTPATIENT)
Dept: UROLOGY | Age: 54
End: 2024-07-17
Payer: COMMERCIAL

## 2024-07-17 VITALS
BODY MASS INDEX: 34.93 KG/M2 | WEIGHT: 223 LBS | SYSTOLIC BLOOD PRESSURE: 120 MMHG | TEMPERATURE: 97.6 F | DIASTOLIC BLOOD PRESSURE: 78 MMHG | HEART RATE: 59 BPM

## 2024-07-17 DIAGNOSIS — M62.81 MUSCLE WEAKNESS: Primary | ICD-10-CM

## 2024-07-17 DIAGNOSIS — N39.46 MIXED INCONTINENCE: ICD-10-CM

## 2024-07-17 PROCEDURE — 91122 PR ANORECTAL MANOMETRY: CPT | Performed by: PHYSICIAN ASSISTANT

## 2024-07-17 PROCEDURE — 51784 ANAL/URINARY MUSCLE STUDY: CPT | Performed by: PHYSICIAN ASSISTANT

## 2024-07-17 PROCEDURE — 97750 PHYSICAL PERFORMANCE TEST: CPT | Performed by: PHYSICIAN ASSISTANT

## 2024-07-17 PROCEDURE — 97032 APPL MODALITY 1+ESTIM EA 15: CPT | Performed by: PHYSICIAN ASSISTANT

## 2024-07-17 NOTE — PROGRESS NOTES
PELVIC FLOOR REHAB FOLLOW UP    At last visit (PFR # 4, 2 weeks ago):    Was able to hold for 8-9 seconds at moderate wavy amplitude.   Fatigue after 9 repetitions.      Home exercise regimen prescribed:   Repetitions - 9   Contract - 9 sec   Relax - 10 sec   + 10 Quick Flicks  Frequency- 4 times daily     Stimulated with 18 average mA for 15 minutes    Symptoms  Daytime voiding frequency: q 3 hrs, decreased  Nighttime voiding frequency: 1 times per night, unchanged  Urgency: mild, unchanged    Incontinence episodes per day: 1, Causes: coughing, unchanged  Number of pads used per day: 0, unchanged    Pelvic pain: unchanged  Pain with intercourse unchanged    Bowel habits: unchanged  Fecal incontinence: unchanged    OVERALL IMPROVEMENT SINCE INITIAL SESSION:   mild in degree    Compliance:  Has pt completed exercises as instructed: Yes    Changes Since Initial Visit  Intake of spicy/citrus/tomato based foods:occasionally  unchanged  Caffeine intake per day: unchanged  Fluid intake per day: unchanged  Alcohol intake: unchanged  Smoking: unchanged    Was able to hold for 10 seconds at high wavy amplitude.   Fatigue after 10 repetitions.     Home exercise regimen prescribed:   Repetitions - 10   Contract - 10 sec   Relax - 10 sec   + 10 Quick Flicks  Frequency- 4 times daily    Stimulated with 21 average mA for 15 minutes.

## 2024-07-18 PROBLEM — J44.9 CHRONIC OBSTRUCTIVE PULMONARY DISEASE, UNSPECIFIED (HCC): Status: ACTIVE | Noted: 2024-07-18

## 2024-07-24 ENCOUNTER — PROCEDURE VISIT (OUTPATIENT)
Dept: UROLOGY | Age: 54
End: 2024-07-24
Payer: COMMERCIAL

## 2024-07-24 VITALS
DIASTOLIC BLOOD PRESSURE: 68 MMHG | BODY MASS INDEX: 34.77 KG/M2 | WEIGHT: 222 LBS | TEMPERATURE: 97.2 F | HEART RATE: 75 BPM | SYSTOLIC BLOOD PRESSURE: 122 MMHG

## 2024-07-24 DIAGNOSIS — M62.81 MUSCLE WEAKNESS: Primary | ICD-10-CM

## 2024-07-24 DIAGNOSIS — N39.46 MIXED INCONTINENCE: ICD-10-CM

## 2024-07-24 PROCEDURE — 91122 PR ANORECTAL MANOMETRY: CPT | Performed by: PHYSICIAN ASSISTANT

## 2024-07-24 PROCEDURE — 97032 APPL MODALITY 1+ESTIM EA 15: CPT | Performed by: PHYSICIAN ASSISTANT

## 2024-07-24 PROCEDURE — 51784 ANAL/URINARY MUSCLE STUDY: CPT | Performed by: PHYSICIAN ASSISTANT

## 2024-07-24 PROCEDURE — 97750 PHYSICAL PERFORMANCE TEST: CPT | Performed by: PHYSICIAN ASSISTANT

## 2024-07-24 NOTE — PROGRESS NOTES
PELVIC FLOOR REHAB FOLLOW UP    At last visit (PFR # 5, 2 weeks ago):    Was able to hold for 10 seconds at high wavy amplitude.   Fatigue after 10 repetitions.      Home exercise regimen prescribed:   Repetitions - 10   Contract - 10 sec   Relax - 10 sec   + 10 Quick Flicks  Frequency- 4 times daily     Stimulated with 21 average mA for 15 minutes      Symptoms  Daytime voiding frequency: q 3 hrs, unchanged  Nighttime voiding frequency: 0 times per night, decreased  Urgency: mild, unchanged    Incontinence episodes per day: 0, Causes: none, decreased  Number of pads used per day: 0, decreased    Pelvic pain: unchanged  Pain with intercourse unchanged    Bowel habits: unchanged  Fecal incontinence: unchanged    OVERALL IMPROVEMENT SINCE INITIAL SESSION:   mild in degree    Compliance:  Has pt completed exercises as instructed: Yes    Changes Since Initial Visit  Intake of spicy/citrus/tomato based foods: unchanged  Caffeine intake per day: unchanged  Fluid intake per day: unchanged  Alcohol intake: unchanged  Smoking: unchanged    Was able to hold for 11 seconds at high wavy amplitude.   Fatigue after 11 repetitions.     Home exercise regimen prescribed:   Repetitions - 11   Contract - 11 sec   Relax - 10 sec   + 11 Quick Flicks  Frequency- 4 times daily    Stimulated with 22 average mA for 15 minutes.

## 2024-07-31 ENCOUNTER — PROCEDURE VISIT (OUTPATIENT)
Dept: UROLOGY | Age: 54
End: 2024-07-31
Payer: MEDICARE

## 2024-07-31 ENCOUNTER — OFFICE VISIT (OUTPATIENT)
Dept: OBGYN | Age: 54
End: 2024-07-31
Payer: MEDICARE

## 2024-07-31 VITALS
WEIGHT: 222 LBS | BODY MASS INDEX: 34.77 KG/M2 | SYSTOLIC BLOOD PRESSURE: 122 MMHG | HEART RATE: 75 BPM | TEMPERATURE: 97.8 F | DIASTOLIC BLOOD PRESSURE: 70 MMHG

## 2024-07-31 VITALS
SYSTOLIC BLOOD PRESSURE: 118 MMHG | WEIGHT: 223 LBS | BODY MASS INDEX: 35 KG/M2 | DIASTOLIC BLOOD PRESSURE: 80 MMHG | HEIGHT: 67 IN

## 2024-07-31 DIAGNOSIS — N39.46 MIXED INCONTINENCE: ICD-10-CM

## 2024-07-31 DIAGNOSIS — Z01.419 ENCOUNTER FOR ROUTINE GYNECOLOGIC EXAMINATION IN MEDICARE PATIENT: Primary | ICD-10-CM

## 2024-07-31 DIAGNOSIS — M62.81 MUSCLE WEAKNESS: Primary | ICD-10-CM

## 2024-07-31 PROCEDURE — G8417 CALC BMI ABV UP PARAM F/U: HCPCS | Performed by: ADVANCED PRACTICE MIDWIFE

## 2024-07-31 PROCEDURE — 97032 APPL MODALITY 1+ESTIM EA 15: CPT | Performed by: PHYSICIAN ASSISTANT

## 2024-07-31 PROCEDURE — 51784 ANAL/URINARY MUSCLE STUDY: CPT | Performed by: PHYSICIAN ASSISTANT

## 2024-07-31 PROCEDURE — G0101 CA SCREEN;PELVIC/BREAST EXAM: HCPCS | Performed by: ADVANCED PRACTICE MIDWIFE

## 2024-07-31 PROCEDURE — 91122 PR ANORECTAL MANOMETRY: CPT | Performed by: PHYSICIAN ASSISTANT

## 2024-07-31 PROCEDURE — G8427 DOCREV CUR MEDS BY ELIG CLIN: HCPCS | Performed by: ADVANCED PRACTICE MIDWIFE

## 2024-07-31 ASSESSMENT — ENCOUNTER SYMPTOMS
RESPIRATORY NEGATIVE: 1
GASTROINTESTINAL NEGATIVE: 1
EYES NEGATIVE: 1
ALLERGIC/IMMUNOLOGIC NEGATIVE: 1

## 2024-07-31 NOTE — PROGRESS NOTES
PELVIC FLOOR REHAB FOLLOW UP    At last visit (PFR # 6, 1 weeks ago):           Was able to hold for 11 seconds at high wavy amplitude.   Fatigue after 11 repetitions.      Home exercise regimen prescribed:   Repetitions - 11   Contract - 11 sec   Relax - 10 sec   + 11 Quick Flicks  Frequency- 4 times daily     Stimulated with 22 average mA for 15 minutes.       Symptoms  Daytime voiding frequency: q 2 hrs, decreased  Nighttime voiding frequency: 0 times per night, unchanged  Urgency: moderate, unchanged    Incontinence episodes per day: 0, Causes: none, unchanged  Number of pads used per day: 0, unchanged    Pelvic pain: unchanged  Pain with intercourse unchanged    Bowel habits: unchanged  Fecal incontinence: unchanged    OVERALL IMPROVEMENT SINCE INITIAL SESSION:   mild in degree    Compliance:  Has pt completed exercises as instructed: Yes    Changes Since Initial Visit  Intake of spicy/citrus/tomato based foods: unchanged  Caffeine intake per day: unchanged  Fluid intake per day: unchanged  Alcohol intake: unchanged  Smoking: unchanged    Was able to hold for 12 seconds at moderate flat amplitude.   Fatigue after 12 repetitions.     Home exercise regimen prescribed:   Repetitions - 12   Contract - 12 sec   Relax - 10 sec   + 12 Quick Flicks  Frequency- 4 times daily    Stimulated with 25 average mA for 15 minutes.

## 2024-07-31 NOTE — PROGRESS NOTES
MEDICARE PHYSICAL    Date of service: 2024    Grazyna Rojas  Is a 54 y.o.  female    PT's PCP is: Alexis Maldonado APRN - CNP     : 1970     HIGH RISK ASSESSMENT    Maternal ASHLEY Exposure (in utero): No    Sexual activity < 16 yrs of age: Yes    Greater than 5 sexual partners: No    3 abnormal paps in the last 7 years: No    History of any STD (including HIV): Yes, trich                                         Subjective:       No LMP recorded (lmp unknown). Patient has had a hysterectomy.     Are your menses regular: not applicable    OB History    Para Term  AB Living   2 2 1     2   SAB IAB Ectopic Molar Multiple Live Births             2      # Outcome Date GA Lbr Tima/2nd Weight Sex Delivery Anes PTL Lv   2 Term 96 42w0d   M Vag-Spont None N DARRYL      Complications: Cord around body   1 Para 92 42w0d   F Vag-Spont None N DARRYL        Social History     Tobacco Use   Smoking Status Every Day    Current packs/day: 1.00    Average packs/day: 1 pack/day for 23.0 years (23.0 ttl pk-yrs)    Types: Cigarettes   Smokeless Tobacco Never        Social History     Substance and Sexual Activity   Alcohol Use Not Currently       Allergies: Bee venom, Sulfa antibiotics, and Ciprofloxacin hcl      Current Outpatient Medications:     rosuvastatin (CRESTOR) 40 MG tablet, TAKE 1 TABLET BY MOUTH DAILY, Disp: 90 tablet, Rfl: 1    tiotropium (SPIRIVA HANDIHALER) 18 MCG inhalation capsule, Inhale 1 capsule into the lungs daily, Disp: 90 capsule, Rfl: 1    pantoprazole (PROTONIX) 40 MG tablet, TAKE 1 TABLET BY MOUTH TWICE A DAY IN THE MORNING AND AT BEDTIME, Disp: 180 tablet, Rfl: 1    atenolol (TENORMIN) 100 MG tablet, TAKE 1 TABLET BY MOUTH DAILY, Disp: 90 tablet, Rfl: 1    albuterol sulfate HFA (VENTOLIN HFA) 108 (90 Base) MCG/ACT inhaler, Inhale 2 puffs into the lungs 4 times daily as needed for Wheezing, Disp: 18 g, Rfl: 3    EPINEPHrine (EPIPEN 2-SHLOMO) 0.3 MG/0.3ML SOAJ

## 2024-08-14 ENCOUNTER — PROCEDURE VISIT (OUTPATIENT)
Dept: UROLOGY | Age: 54
End: 2024-08-14
Payer: MEDICARE

## 2024-08-14 VITALS
TEMPERATURE: 97.8 F | DIASTOLIC BLOOD PRESSURE: 68 MMHG | HEART RATE: 76 BPM | SYSTOLIC BLOOD PRESSURE: 118 MMHG | BODY MASS INDEX: 34.61 KG/M2 | WEIGHT: 221 LBS

## 2024-08-14 DIAGNOSIS — N39.46 MIXED INCONTINENCE: ICD-10-CM

## 2024-08-14 DIAGNOSIS — M62.81 MUSCLE WEAKNESS: Primary | ICD-10-CM

## 2024-08-14 PROCEDURE — 51784 ANAL/URINARY MUSCLE STUDY: CPT | Performed by: PHYSICIAN ASSISTANT

## 2024-08-14 PROCEDURE — 91122 ANAL PRESSURE RECORD: CPT | Performed by: PHYSICIAN ASSISTANT

## 2024-08-14 PROCEDURE — 97032 APPL MODALITY 1+ESTIM EA 15: CPT | Performed by: PHYSICIAN ASSISTANT

## 2024-08-14 RX ORDER — PREDNISONE 10 MG/1
10 TABLET ORAL DAILY
COMMUNITY
Start: 2024-08-13

## 2024-08-14 NOTE — PROGRESS NOTES
PELVIC FLOOR REHAB FOLLOW UP    At last visit (PFR # 7, 1 weeks ago):    Was able to hold for 12 seconds at moderate flat amplitude.   Fatigue after 12 repetitions.      Home exercise regimen prescribed:   Repetitions - 12   Contract - 12 sec   Relax - 10 sec   + 12 Quick Flicks  Frequency- 4 times daily     Stimulated with 25 average mA for 15 minutes.      Symptoms  Daytime voiding frequency: q 2-3 hrs, decreased  Nighttime voiding frequency: 1 times per night, increased  Urgency: mild, unchanged    Incontinence episodes per day: 0, Causes: none, unchanged  Number of pads used per day: 0, unchanged    Pelvic pain: unchanged  Pain with intercourse unchanged    Bowel habits: unchanged  Fecal incontinence: unchanged    OVERALL IMPROVEMENT SINCE INITIAL SESSION:   mild in degree    Compliance:  Has pt completed exercises as instructed: No: patient has been ill for 10 days.    Changes Since Initial Visit  Intake of spicy/citrus/tomato based foods: unchanged  Caffeine intake per day: unchanged  Fluid intake per day: unchanged  Alcohol intake: unchanged  Smoking: unchanged    Was able to hold for 12 seconds at high amplitude.   Fatigue after 12 repetitions.     Home exercise regimen prescribed:   Repetitions - 12   Contract - 10 sec   Relax - 12 sec   + 12 Quick Flicks  Frequency- 4 times daily    Stimulated with 29 average mA for 15 minutes.

## 2024-08-19 ENCOUNTER — OFFICE VISIT (OUTPATIENT)
Dept: PULMONOLOGY | Age: 54
End: 2024-08-19
Payer: COMMERCIAL

## 2024-08-19 VITALS
BODY MASS INDEX: 33.24 KG/M2 | OXYGEN SATURATION: 97 % | HEART RATE: 66 BPM | SYSTOLIC BLOOD PRESSURE: 136 MMHG | TEMPERATURE: 96.4 F | WEIGHT: 219.3 LBS | RESPIRATION RATE: 16 BRPM | DIASTOLIC BLOOD PRESSURE: 72 MMHG | HEIGHT: 68 IN

## 2024-08-19 DIAGNOSIS — J44.9 CHRONIC OBSTRUCTIVE PULMONARY DISEASE, UNSPECIFIED COPD TYPE (HCC): Primary | ICD-10-CM

## 2024-08-19 DIAGNOSIS — I31.39 PERICARDIAL EFFUSION: ICD-10-CM

## 2024-08-19 DIAGNOSIS — J90 BILATERAL PLEURAL EFFUSION: ICD-10-CM

## 2024-08-19 DIAGNOSIS — Z86.16 HISTORY OF COVID-19: ICD-10-CM

## 2024-08-19 PROCEDURE — G8417 CALC BMI ABV UP PARAM F/U: HCPCS | Performed by: INTERNAL MEDICINE

## 2024-08-19 PROCEDURE — G8427 DOCREV CUR MEDS BY ELIG CLIN: HCPCS | Performed by: INTERNAL MEDICINE

## 2024-08-19 PROCEDURE — 3017F COLORECTAL CA SCREEN DOC REV: CPT | Performed by: INTERNAL MEDICINE

## 2024-08-19 PROCEDURE — 4004F PT TOBACCO SCREEN RCVD TLK: CPT | Performed by: INTERNAL MEDICINE

## 2024-08-19 PROCEDURE — 99204 OFFICE O/P NEW MOD 45 MIN: CPT | Performed by: INTERNAL MEDICINE

## 2024-08-19 PROCEDURE — 3023F SPIROM DOC REV: CPT | Performed by: INTERNAL MEDICINE

## 2024-08-19 RX ORDER — M-VIT,TX,IRON,MINS/CALC/FOLIC 27MG-0.4MG
1 TABLET ORAL DAILY
COMMUNITY

## 2024-08-19 NOTE — PROGRESS NOTES
PULMONARY  CONSULTATION        REFERRED BY: Alexis Maldonado, APRN - CNP    REASON FOR CONSULTATION: Abnormal pulmonary function test    HISTORY OF PRESENT ILLNESS:    Grazyna Rojas is a 54 y.o. year old female here for evaluation of above reason  Patient had pulmonary function test done in July 2024 that showed stage II obstructive ventilatory defect  Patient is a smoker and smokes about a pack a day for the last 23 years  Has been using Spiriva and does not have any shortness of breath or wheezing  No cough or sputum production  No hemoptysis  No orthopnea, PND or increasing pedal edema  No chest pain or pressure      PAST MEDICAL HISTORY:       Diagnosis Date    Anxiety     Bipolar affective disorder (HCC)     Chronic low back pain     Dr. Fuller [facility] neurosurgery    COPD (chronic obstructive pulmonary disease) (HCC)     Depression     Emphysema lung (HCC)     Gastroesophageal reflux disease with esophagitis without hemorrhage     Dr. Guillermo    Hyperlipidemia     Hypertension     Irritable bowel syndrome     Osteoarthritis     Prediabetes     Schizophrenia, schizo-affective type, depressed (HCC)     has had ECT tx in past       SURGICAL HISTORY:    Past Surgical History:   Procedure Laterality Date    ABDOMEN SURGERY  1988    exploratory lap, ovarian tubal abscess rupture    APPENDECTOMY  1995    CARDIAC CATHETERIZATION  2013    NORMAL    CHOLECYSTECTOMY  1995    COLONOSCOPY  03/02/2021    COLONOSCOPY N/A 03/02/2021    COLONOSCOPY POLYPECTOMY REMOVAL SNARE/STOMA performed by Gino Zapata MD at [facility] OR    ESOPHAGOGASTRODUODENOSCOPY  07/25/2023    ,dilation    HYSTERECTOMY, TOTAL ABDOMINAL (CERVIX REMOVED)  2003 1/2 of right ovary retained    SPINAL CORD STIMULATOR SURGERY  01/30/2020    [facility].    TONSILLECTOMY  1974    UPPER GASTROINTESTINAL ENDOSCOPY  03/02/2021    UPPER GASTROINTESTINAL ENDOSCOPY N/A 03/02/2021    EGD BIOPSY performed by Gino Zapata MD at

## 2024-08-19 NOTE — PROGRESS NOTES
[facility], [facility] PART OF [address] [address]  Dept: [phone]  Dept Fax: [phone]      8/19/24    Patient: Patrick Espinoza  YOB: 1970    Dear Alexis Maldonado, APRN - CNP,    I had the pleasure of seeing one of your patients, PATRICK ESPINOZA today in the office today.  Please find attached my note with the assessment and plan of care.  Thank you for allowing me to participate in the care of this patient.  I will keep you updated on this patient's follow up and I look forward to serving you and your patients again in the future.    Moise Vinson MD  8/19/2024 9:22 AM

## 2024-10-22 ENCOUNTER — HOSPITAL ENCOUNTER (OUTPATIENT)
Age: 54
Discharge: HOME | End: 2024-10-24
Payer: MEDICARE

## 2024-10-22 ENCOUNTER — HOSPITAL ENCOUNTER (OUTPATIENT)
Dept: GENERAL RADIOLOGY | Age: 54
Discharge: HOME | End: 2024-10-24
Payer: MEDICARE

## 2024-10-22 DIAGNOSIS — M54.2 NECK PAIN, BILATERAL POSTERIOR: ICD-10-CM

## 2024-10-22 PROCEDURE — 72070 X-RAY EXAM THORAC SPINE 2VWS: CPT

## 2024-10-22 PROCEDURE — 72040 X-RAY EXAM NECK SPINE 2-3 VW: CPT

## 2024-10-24 NOTE — RESULT ENCOUNTER NOTE
Xrya shows mild DDD and degeneration. Also mild flattening of curve; recommend PT for eval and tx. F/u in 1 month if no improvement or any worsening symptoms

## 2025-02-17 ENCOUNTER — OFFICE VISIT (OUTPATIENT)
Dept: PULMONOLOGY | Age: 55
End: 2025-02-17
Payer: COMMERCIAL

## 2025-02-17 VITALS
BODY MASS INDEX: 33.52 KG/M2 | OXYGEN SATURATION: 93 % | HEART RATE: 64 BPM | WEIGHT: 221.2 LBS | HEIGHT: 68 IN | TEMPERATURE: 96.6 F | DIASTOLIC BLOOD PRESSURE: 64 MMHG | RESPIRATION RATE: 20 BRPM | SYSTOLIC BLOOD PRESSURE: 120 MMHG

## 2025-02-17 DIAGNOSIS — Z86.16 HISTORY OF COVID-19: ICD-10-CM

## 2025-02-17 DIAGNOSIS — F17.200 SMOKING: ICD-10-CM

## 2025-02-17 DIAGNOSIS — J44.9 STAGE 2 MODERATE COPD BY GOLD CLASSIFICATION (HCC): ICD-10-CM

## 2025-02-17 DIAGNOSIS — J90 BILATERAL PLEURAL EFFUSION: ICD-10-CM

## 2025-02-17 DIAGNOSIS — J44.9 CHRONIC OBSTRUCTIVE PULMONARY DISEASE, UNSPECIFIED COPD TYPE (HCC): Primary | ICD-10-CM

## 2025-02-17 DIAGNOSIS — I31.39 PERICARDIAL EFFUSION: ICD-10-CM

## 2025-02-17 PROCEDURE — 99214 OFFICE O/P EST MOD 30 MIN: CPT | Performed by: INTERNAL MEDICINE

## 2025-02-17 RX ORDER — TIOTROPIUM BROMIDE 18 UG/1
18 CAPSULE ORAL; RESPIRATORY (INHALATION) DAILY
Qty: 90 CAPSULE | Refills: 1 | Status: SHIPPED | OUTPATIENT
Start: 2025-02-17

## 2025-02-17 RX ORDER — BACLOFEN 5 MG/1
5 TABLET ORAL 3 TIMES DAILY
COMMUNITY
Start: 2025-01-23

## 2025-02-17 NOTE — PATIENT INSTRUCTIONS
SURVEY:    Thank you for allowing us to care for you today.    You may be receiving a survey from [facility] regarding your visit today- electronically or via mail.      Please help us by completing the survey as this will provide the needed feedback to ensure we are providing the very best care for you and your family.    If you cannot score us a very good on any question, please call the office to discuss how we could have made your experience a very good one.    Thank you.       STAFF:    Emerita Norman, Susanne IBANEZ      CLINICAL STAFF:    Shanae ROSA, Lin IBANEZ, Mattie IBANEZ, Norma ROSA

## 2025-02-17 NOTE — PROGRESS NOTES
PULMONARY OP  PROGRESS NOTE      Patient:  Grazyna Rojas  YOB: 1970    MRN: [id number]     Acct: [id number]       Pt seen and Chart reviewed.  Grazyna Rojas is here in followup for   1. Chronic obstructive pulmonary disease, unspecified COPD type (HCC)    2. History of COVID-19    3. Pericardial effusion    4. Bilateral pleural effusion    5. Stage 2 moderate COPD by GOLD classification (HCC)    6. Smoking          History of Present Illness  The patient is a 54-year-old female who presents for follow-up of COPD, history of COVID-19 infection, pericardial effusion, and bilateral pleural effusion. The patient's COPD was staged as II in July 2024.    She reports no recent hospitalizations or emergency room visits due to COPD exacerbations. She has not experienced any increase in shortness of breath or wheezing. Occasionally, she experiences coughing and expectoration of phlegm, but there is no hemoptysis. She also reports no weight loss or loss of appetite. She does not experience orthopnea or peripheral edema. She continues to smoke but expresses a desire to quit. Her current medication regimen includes albuterol as needed and Spiriva inhaler daily, for which she requests a refill.    She is scheduled for spinal cord surgery on 03/04/2025 due to bulging and herniated discs at levels C4, C5, C6, and C7, with plans to insert a plate. She has previously been treated with steroids for her spinal condition.    SOCIAL HISTORY  The patient admits to smoking and has plans to quit.    MEDICATIONS  albuterol, Spiriva inhaler    IMMUNIZATIONS  The patient needs to receive the pneumonia vaccine.         Review of Systems -   Constitutional ROS: negative  ENT ROS: negative  Allergy and Immunology ROS: negative  Respiratory ROS: negative  Cardiovascular ROS: negative  Gastrointestinal ROS: negative  Musculoskeletal ROS: negative         Allergies:  Allergies   Allergen Reactions    Bee Venom Shortness Of Breath and

## 2025-02-28 ENCOUNTER — HOSPITAL ENCOUNTER (INPATIENT)
Age: 55
LOS: 1 days | Discharge: HOME | End: 2025-02-28
Admitting: STUDENT IN AN ORGANIZED HEALTH CARE EDUCATION/TRAINING PROGRAM
Payer: MEDICARE

## 2025-02-28 ENCOUNTER — APPOINTMENT (OUTPATIENT)
Dept: GENERAL RADIOLOGY | Age: 55
End: 2025-02-28
Payer: MEDICARE

## 2025-02-28 ENCOUNTER — APPOINTMENT (OUTPATIENT)
Age: 55
End: 2025-02-28
Payer: MEDICARE

## 2025-02-28 VITALS
OXYGEN SATURATION: 92 % | HEIGHT: 67 IN | RESPIRATION RATE: 20 BRPM | SYSTOLIC BLOOD PRESSURE: 171 MMHG | WEIGHT: 224.65 LBS | HEART RATE: 59 BPM | TEMPERATURE: 98.6 F | BODY MASS INDEX: 35.26 KG/M2 | DIASTOLIC BLOOD PRESSURE: 87 MMHG

## 2025-02-28 DIAGNOSIS — I48.91 ATRIAL FIBRILLATION WITH RVR (HCC): ICD-10-CM

## 2025-02-28 DIAGNOSIS — I48.91 NEW ONSET A-FIB (HCC): ICD-10-CM

## 2025-02-28 DIAGNOSIS — I48.91 ATRIAL FIBRILLATION, UNSPECIFIED TYPE (HCC): ICD-10-CM

## 2025-02-28 DIAGNOSIS — I48.91 ATRIAL FIBRILLATION WITH RAPID VENTRICULAR RESPONSE (HCC): Primary | ICD-10-CM

## 2025-02-28 LAB
ANION GAP SERPL CALCULATED.3IONS-SCNC: 11 MMOL/L (ref 9–16)
ANION GAP SERPL CALCULATED.3IONS-SCNC: 7 MMOL/L (ref 9–16)
BASOPHILS # BLD: <0.03 K/UL (ref 0–0.2)
BASOPHILS # BLD: <0.03 K/UL (ref 0–0.2)
BASOPHILS NFR BLD: 0 % (ref 0–2)
BASOPHILS NFR BLD: 0 % (ref 0–2)
BNP SERPL-MCNC: 189 PG/ML (ref 0–125)
BUN SERPL-MCNC: 6 MG/DL (ref 6–20)
BUN SERPL-MCNC: 7 MG/DL (ref 6–20)
BUN/CREAT SERPL: 12 (ref 9–20)
BUN/CREAT SERPL: 12 (ref 9–20)
CALCIUM SERPL-MCNC: 9.3 MG/DL (ref 8.6–10.4)
CALCIUM SERPL-MCNC: 9.6 MG/DL (ref 8.6–10.4)
CHLORIDE SERPL-SCNC: 103 MMOL/L (ref 98–107)
CHLORIDE SERPL-SCNC: 106 MMOL/L (ref 98–107)
CO2 SERPL-SCNC: 27 MMOL/L (ref 20–31)
CO2 SERPL-SCNC: 29 MMOL/L (ref 20–31)
CREAT SERPL-MCNC: 0.5 MG/DL (ref 0.5–0.9)
CREAT SERPL-MCNC: 0.6 MG/DL (ref 0.5–0.9)
ECHO AO SINUS VALSALVA DIAM: 2.6 CM
ECHO AO SINUS VALSALVA INDEX: 1.23 CM/M2
ECHO AO ST JNCT DIAM: 1.8 CM
ECHO AV CUSP MM: 1.6 CM
ECHO AV MEAN GRADIENT: 6 MMHG
ECHO AV MEAN VELOCITY: 1.1 M/S
ECHO AV PEAK GRADIENT: 12 MMHG
ECHO AV PEAK VELOCITY: 1.7 M/S
ECHO AV VELOCITY RATIO: 0.76
ECHO AV VTI: 36.4 CM
ECHO BSA: 2.19 M2
ECHO EST RA PRESSURE: 3 MMHG
ECHO LA AREA 2C: 18.9 CM2
ECHO LA AREA 4C: 19.6 CM2
ECHO LA MAJOR AXIS: 5.9 CM
ECHO LA MINOR AXIS: 6.1 CM
ECHO LA VOL BP: 50 ML (ref 22–52)
ECHO LA VOL MOD A2C: 48 ML (ref 22–52)
ECHO LA VOL MOD A4C: 51 ML (ref 22–52)
ECHO LA VOL/BSA BIPLANE: 24 ML/M2 (ref 16–34)
ECHO LA VOLUME INDEX MOD A2C: 23 ML/M2 (ref 16–34)
ECHO LA VOLUME INDEX MOD A4C: 24 ML/M2 (ref 16–34)
ECHO LV E' LATERAL VELOCITY: 11.4 CM/S
ECHO LV EDV A2C: 77 ML
ECHO LV EDV A4C: 91 ML
ECHO LV EDV INDEX A4C: 43 ML/M2
ECHO LV EDV NDEX A2C: 36 ML/M2
ECHO LV EF PHYSICIAN: 65 %
ECHO LV EJECTION FRACTION A2C: 66 %
ECHO LV EJECTION FRACTION A4C: 63 %
ECHO LV EJECTION FRACTION BIPLANE: 65 % (ref 55–100)
ECHO LV ESV A2C: 26 ML
ECHO LV ESV A4C: 34 ML
ECHO LV ESV INDEX A2C: 12 ML/M2
ECHO LV ESV INDEX A4C: 16 ML/M2
ECHO LV FRACTIONAL SHORTENING: 37 % (ref 28–44)
ECHO LV INTERNAL DIMENSION DIASTOLE INDEX: 2.55 CM/M2
ECHO LV INTERNAL DIMENSION DIASTOLIC: 5.4 CM (ref 3.9–5.3)
ECHO LV INTERNAL DIMENSION SYSTOLIC INDEX: 1.6 CM/M2
ECHO LV INTERNAL DIMENSION SYSTOLIC: 3.4 CM
ECHO LV IVSD: 0.9 CM (ref 0.6–0.9)
ECHO LV MASS 2D: 180.1 G (ref 67–162)
ECHO LV MASS INDEX 2D: 85 G/M2 (ref 43–95)
ECHO LV POSTERIOR WALL DIASTOLIC: 0.9 CM (ref 0.6–0.9)
ECHO LV RELATIVE WALL THICKNESS RATIO: 0.33
ECHO LVOT AV VTI INDEX: 0.74
ECHO LVOT MEAN GRADIENT: 3 MMHG
ECHO LVOT PEAK GRADIENT: 6 MMHG
ECHO LVOT PEAK VELOCITY: 1.3 M/S
ECHO LVOT VTI: 27.1 CM
ECHO MV A VELOCITY: 0.72 M/S
ECHO MV E DECELERATION TIME (DT): 211 MS
ECHO MV E VELOCITY: 0.97 M/S
ECHO MV E/A RATIO: 1.35
ECHO MV E/E' LATERAL: 8.51
ECHO PV MAX VELOCITY: 1.3 M/S
ECHO PV PEAK GRADIENT: 7 MMHG
ECHO RIGHT VENTRICULAR SYSTOLIC PRESSURE (RVSP): 22 MMHG
ECHO TV REGURGITANT MAX VELOCITY: 2.2 M/S
ECHO TV REGURGITANT PEAK GRADIENT: 19 MMHG
EKG ATRIAL RATE: 63 BPM
EKG ATRIAL RATE: 72 BPM
EKG P AXIS: 49 DEGREES
EKG P AXIS: 55 DEGREES
EKG P-R INTERVAL: 138 MS
EKG P-R INTERVAL: 154 MS
EKG Q-T INTERVAL: 264 MS
EKG Q-T INTERVAL: 400 MS
EKG Q-T INTERVAL: 438 MS
EKG QRS DURATION: 70 MS
EKG QRS DURATION: 76 MS
EKG QRS DURATION: 86 MS
EKG QTC CALCULATION (BAZETT): 438 MS
EKG QTC CALCULATION (BAZETT): 448 MS
EKG QTC CALCULATION (BAZETT): 454 MS
EKG R AXIS: 61 DEGREES
EKG R AXIS: 74 DEGREES
EKG R AXIS: 82 DEGREES
EKG T AXIS: 168 DEGREES
EKG T AXIS: 53 DEGREES
EKG T AXIS: 58 DEGREES
EKG VENTRICULAR RATE: 178 BPM
EKG VENTRICULAR RATE: 63 BPM
EKG VENTRICULAR RATE: 72 BPM
EOSINOPHIL # BLD: 0.04 K/UL (ref 0–0.44)
EOSINOPHIL # BLD: 0.08 K/UL (ref 0–0.44)
EOSINOPHILS RELATIVE PERCENT: 1 % (ref 1–4)
EOSINOPHILS RELATIVE PERCENT: 1 % (ref 1–4)
ERYTHROCYTE [DISTWIDTH] IN BLOOD BY AUTOMATED COUNT: 12.3 % (ref 11.8–14.4)
ERYTHROCYTE [DISTWIDTH] IN BLOOD BY AUTOMATED COUNT: 12.3 % (ref 11.8–14.4)
GFR, ESTIMATED: >90 ML/MIN/1.73M2
GFR, ESTIMATED: >90 ML/MIN/1.73M2
GLUCOSE SERPL-MCNC: 105 MG/DL (ref 74–99)
GLUCOSE SERPL-MCNC: 180 MG/DL (ref 74–99)
HCT VFR BLD AUTO: 39.7 % (ref 36.3–47.1)
HCT VFR BLD AUTO: 42.3 % (ref 36.3–47.1)
HGB BLD-MCNC: 13.6 G/DL (ref 11.9–15.1)
HGB BLD-MCNC: 14.8 G/DL (ref 11.9–15.1)
IMM GRANULOCYTES # BLD AUTO: <0.03 K/UL (ref 0–0.3)
IMM GRANULOCYTES # BLD AUTO: <0.03 K/UL (ref 0–0.3)
IMM GRANULOCYTES NFR BLD: 0 %
IMM GRANULOCYTES NFR BLD: 0 %
LYMPHOCYTES NFR BLD: 2.05 K/UL (ref 1.1–3.7)
LYMPHOCYTES NFR BLD: 2.68 K/UL (ref 1.1–3.7)
LYMPHOCYTES RELATIVE PERCENT: 34 % (ref 24–43)
LYMPHOCYTES RELATIVE PERCENT: 38 % (ref 24–43)
MAGNESIUM SERPL-MCNC: 1.9 MG/DL (ref 1.6–2.6)
MCH RBC QN AUTO: 30.6 PG (ref 25.2–33.5)
MCH RBC QN AUTO: 31.1 PG (ref 25.2–33.5)
MCHC RBC AUTO-ENTMCNC: 34.3 G/DL (ref 28.4–34.8)
MCHC RBC AUTO-ENTMCNC: 35 G/DL (ref 28.4–34.8)
MCV RBC AUTO: 88.9 FL (ref 82.6–102.9)
MCV RBC AUTO: 89.4 FL (ref 82.6–102.9)
MONOCYTES NFR BLD: 0.75 K/UL (ref 0.1–1.2)
MONOCYTES NFR BLD: 0.97 K/UL (ref 0.1–1.2)
MONOCYTES NFR BLD: 12 % (ref 3–12)
MONOCYTES NFR BLD: 14 % (ref 3–12)
NEUTROPHILS NFR BLD: 47 % (ref 36–65)
NEUTROPHILS NFR BLD: 53 % (ref 36–65)
NEUTS SEG NFR BLD: 3.16 K/UL (ref 1.5–8.1)
NEUTS SEG NFR BLD: 3.34 K/UL (ref 1.5–8.1)
NRBC BLD-RTO: 0 PER 100 WBC
NRBC BLD-RTO: 0 PER 100 WBC
PLATELET # BLD AUTO: 168 K/UL (ref 138–453)
PLATELET # BLD AUTO: 169 K/UL (ref 138–453)
PMV BLD AUTO: 10.6 FL (ref 8.1–13.5)
PMV BLD AUTO: 10.9 FL (ref 8.1–13.5)
POTASSIUM SERPL-SCNC: 3.7 MMOL/L (ref 3.7–5.3)
POTASSIUM SERPL-SCNC: 3.9 MMOL/L (ref 3.7–5.3)
RBC # BLD AUTO: 4.44 M/UL (ref 3.95–5.11)
RBC # BLD AUTO: 4.76 M/UL (ref 3.95–5.11)
SODIUM SERPL-SCNC: 141 MMOL/L (ref 136–145)
SODIUM SERPL-SCNC: 142 MMOL/L (ref 136–145)
TROPONIN I SERPL HS-MCNC: <6 NG/L (ref 0–14)
TROPONIN I SERPL HS-MCNC: <6 NG/L (ref 0–14)
TSH SERPL DL<=0.05 MIU/L-ACNC: 2.2 UIU/ML (ref 0.27–4.2)
WBC OTHER # BLD: 6 K/UL (ref 3.5–11.3)
WBC OTHER # BLD: 7.1 K/UL (ref 3.5–11.3)

## 2025-02-28 PROCEDURE — 96374 THER/PROPH/DIAG INJ IV PUSH: CPT

## 2025-02-28 PROCEDURE — 83880 ASSAY OF NATRIURETIC PEPTIDE: CPT

## 2025-02-28 PROCEDURE — 93005 ELECTROCARDIOGRAM TRACING: CPT

## 2025-02-28 PROCEDURE — 2580000003 HC RX 258

## 2025-02-28 PROCEDURE — 6370000000 HC RX 637 (ALT 250 FOR IP): Performed by: NURSE PRACTITIONER

## 2025-02-28 PROCEDURE — 99285 EMERGENCY DEPT VISIT HI MDM: CPT

## 2025-02-28 PROCEDURE — 2500000003 HC RX 250 WO HCPCS

## 2025-02-28 PROCEDURE — 6370000000 HC RX 637 (ALT 250 FOR IP)

## 2025-02-28 PROCEDURE — 97161 PT EVAL LOW COMPLEX 20 MIN: CPT

## 2025-02-28 PROCEDURE — 93306 TTE W/DOPPLER COMPLETE: CPT

## 2025-02-28 PROCEDURE — 84443 ASSAY THYROID STIM HORMONE: CPT

## 2025-02-28 PROCEDURE — 2000000000 HC ICU R&B

## 2025-02-28 PROCEDURE — 93010 ELECTROCARDIOGRAM REPORT: CPT | Performed by: INTERNAL MEDICINE

## 2025-02-28 PROCEDURE — 94664 DEMO&/EVAL PT USE INHALER: CPT

## 2025-02-28 PROCEDURE — 84484 ASSAY OF TROPONIN QUANT: CPT

## 2025-02-28 PROCEDURE — 99223 1ST HOSP IP/OBS HIGH 75: CPT | Performed by: FAMILY MEDICINE

## 2025-02-28 PROCEDURE — 71045 X-RAY EXAM CHEST 1 VIEW: CPT

## 2025-02-28 PROCEDURE — 93306 TTE W/DOPPLER COMPLETE: CPT | Performed by: FAMILY MEDICINE

## 2025-02-28 PROCEDURE — 94640 AIRWAY INHALATION TREATMENT: CPT

## 2025-02-28 PROCEDURE — 94761 N-INVAS EAR/PLS OXIMETRY MLT: CPT

## 2025-02-28 PROCEDURE — 83735 ASSAY OF MAGNESIUM: CPT

## 2025-02-28 PROCEDURE — 80048 BASIC METABOLIC PNL TOTAL CA: CPT

## 2025-02-28 PROCEDURE — 96375 TX/PRO/DX INJ NEW DRUG ADDON: CPT

## 2025-02-28 PROCEDURE — 36415 COLL VENOUS BLD VENIPUNCTURE: CPT

## 2025-02-28 PROCEDURE — 85025 COMPLETE CBC W/AUTO DIFF WBC: CPT

## 2025-02-28 RX ORDER — POTASSIUM CHLORIDE 7.45 MG/ML
10 INJECTION INTRAVENOUS PRN
Status: DISCONTINUED | OUTPATIENT
Start: 2025-02-28 | End: 2025-02-28 | Stop reason: HOSPADM

## 2025-02-28 RX ORDER — DILTIAZEM HYDROCHLORIDE 120 MG/1
120 CAPSULE, COATED, EXTENDED RELEASE ORAL DAILY
Status: DISCONTINUED | OUTPATIENT
Start: 2025-02-28 | End: 2025-02-28 | Stop reason: HOSPADM

## 2025-02-28 RX ORDER — SERTRALINE HYDROCHLORIDE 100 MG/1
100 TABLET, FILM COATED ORAL DAILY
Status: DISCONTINUED | OUTPATIENT
Start: 2025-02-28 | End: 2025-02-28 | Stop reason: HOSPADM

## 2025-02-28 RX ORDER — TRAZODONE HYDROCHLORIDE 50 MG/1
100 TABLET ORAL NIGHTLY
Status: DISCONTINUED | OUTPATIENT
Start: 2025-02-28 | End: 2025-02-28 | Stop reason: HOSPADM

## 2025-02-28 RX ORDER — ONDANSETRON 4 MG/1
4 TABLET, ORALLY DISINTEGRATING ORAL EVERY 8 HOURS PRN
Status: DISCONTINUED | OUTPATIENT
Start: 2025-02-28 | End: 2025-02-28 | Stop reason: HOSPADM

## 2025-02-28 RX ORDER — POLYETHYLENE GLYCOL 3350 17 G/17G
17 POWDER, FOR SOLUTION ORAL DAILY PRN
Status: DISCONTINUED | OUTPATIENT
Start: 2025-02-28 | End: 2025-02-28 | Stop reason: HOSPADM

## 2025-02-28 RX ORDER — NICOTINE 21 MG/24HR
1 PATCH, TRANSDERMAL 24 HOURS TRANSDERMAL DAILY
Status: DISCONTINUED | OUTPATIENT
Start: 2025-02-28 | End: 2025-02-28 | Stop reason: HOSPADM

## 2025-02-28 RX ORDER — SODIUM CHLORIDE 0.9 % (FLUSH) 0.9 %
5-40 SYRINGE (ML) INJECTION PRN
Status: DISCONTINUED | OUTPATIENT
Start: 2025-02-28 | End: 2025-02-28 | Stop reason: HOSPADM

## 2025-02-28 RX ORDER — GABAPENTIN 300 MG/1
600 CAPSULE ORAL 3 TIMES DAILY
Status: DISCONTINUED | OUTPATIENT
Start: 2025-02-28 | End: 2025-02-28 | Stop reason: HOSPADM

## 2025-02-28 RX ORDER — SODIUM CHLORIDE 9 MG/ML
INJECTION, SOLUTION INTRAVENOUS PRN
Status: DISCONTINUED | OUTPATIENT
Start: 2025-02-28 | End: 2025-02-28 | Stop reason: HOSPADM

## 2025-02-28 RX ORDER — BACLOFEN 10 MG/1
5 TABLET ORAL 3 TIMES DAILY
Status: DISCONTINUED | OUTPATIENT
Start: 2025-02-28 | End: 2025-02-28 | Stop reason: HOSPADM

## 2025-02-28 RX ORDER — POTASSIUM CHLORIDE 1500 MG/1
40 TABLET, EXTENDED RELEASE ORAL PRN
Status: DISCONTINUED | OUTPATIENT
Start: 2025-02-28 | End: 2025-02-28 | Stop reason: HOSPADM

## 2025-02-28 RX ORDER — M-VIT,TX,IRON,MINS/CALC/FOLIC 27MG-0.4MG
1 TABLET ORAL DAILY
Status: DISCONTINUED | OUTPATIENT
Start: 2025-02-28 | End: 2025-02-28 | Stop reason: HOSPADM

## 2025-02-28 RX ORDER — 0.9 % SODIUM CHLORIDE 0.9 %
1000 INTRAVENOUS SOLUTION INTRAVENOUS ONCE
Status: COMPLETED | OUTPATIENT
Start: 2025-02-28 | End: 2025-02-28

## 2025-02-28 RX ORDER — PANTOPRAZOLE SODIUM 40 MG/1
40 TABLET, DELAYED RELEASE ORAL
Status: DISCONTINUED | OUTPATIENT
Start: 2025-02-28 | End: 2025-02-28 | Stop reason: HOSPADM

## 2025-02-28 RX ORDER — ACETAMINOPHEN 325 MG/1
650 TABLET ORAL EVERY 6 HOURS PRN
Status: DISCONTINUED | OUTPATIENT
Start: 2025-02-28 | End: 2025-02-28 | Stop reason: HOSPADM

## 2025-02-28 RX ORDER — EZETIMIBE 10 MG/1
10 TABLET ORAL DAILY
Status: DISCONTINUED | OUTPATIENT
Start: 2025-02-28 | End: 2025-02-28 | Stop reason: HOSPADM

## 2025-02-28 RX ORDER — SODIUM CHLORIDE 0.9 % (FLUSH) 0.9 %
5-40 SYRINGE (ML) INJECTION EVERY 12 HOURS SCHEDULED
Status: DISCONTINUED | OUTPATIENT
Start: 2025-02-28 | End: 2025-02-28 | Stop reason: HOSPADM

## 2025-02-28 RX ORDER — DILTIAZEM HYDROCHLORIDE 5 MG/ML
5 INJECTION INTRAVENOUS ONCE
Status: COMPLETED | OUTPATIENT
Start: 2025-02-28 | End: 2025-02-28

## 2025-02-28 RX ORDER — BUPRENORPHINE 15 UG/H
1 PATCH TRANSDERMAL WEEKLY
Status: DISCONTINUED | OUTPATIENT
Start: 2025-02-28 | End: 2025-02-28 | Stop reason: HOSPADM

## 2025-02-28 RX ORDER — MAGNESIUM SULFATE IN WATER 40 MG/ML
2000 INJECTION, SOLUTION INTRAVENOUS PRN
Status: DISCONTINUED | OUTPATIENT
Start: 2025-02-28 | End: 2025-02-28 | Stop reason: HOSPADM

## 2025-02-28 RX ORDER — ALBUTEROL SULFATE 90 UG/1
2 INHALANT RESPIRATORY (INHALATION) 4 TIMES DAILY PRN
Status: DISCONTINUED | OUTPATIENT
Start: 2025-02-28 | End: 2025-02-28 | Stop reason: HOSPADM

## 2025-02-28 RX ORDER — ATENOLOL 50 MG/1
100 TABLET ORAL DAILY
Status: DISCONTINUED | OUTPATIENT
Start: 2025-02-28 | End: 2025-02-28 | Stop reason: HOSPADM

## 2025-02-28 RX ORDER — METOPROLOL TARTRATE 1 MG/ML
5 INJECTION, SOLUTION INTRAVENOUS ONCE
Status: COMPLETED | OUTPATIENT
Start: 2025-02-28 | End: 2025-02-28

## 2025-02-28 RX ORDER — NICOTINE 21 MG/24HR
1 PATCH, TRANSDERMAL 24 HOURS TRANSDERMAL DAILY
Qty: 30 PATCH | Refills: 3 | Status: SHIPPED | OUTPATIENT
Start: 2025-03-01

## 2025-02-28 RX ORDER — ROSUVASTATIN CALCIUM 40 MG/1
40 TABLET, COATED ORAL DAILY
Status: DISCONTINUED | OUTPATIENT
Start: 2025-02-28 | End: 2025-02-28 | Stop reason: HOSPADM

## 2025-02-28 RX ORDER — ONDANSETRON 2 MG/ML
4 INJECTION INTRAMUSCULAR; INTRAVENOUS EVERY 6 HOURS PRN
Status: DISCONTINUED | OUTPATIENT
Start: 2025-02-28 | End: 2025-02-28 | Stop reason: HOSPADM

## 2025-02-28 RX ORDER — AMIODARONE HYDROCHLORIDE 100 MG/1
200 TABLET ORAL 2 TIMES DAILY
Qty: 56 TABLET | Refills: 0 | Status: SHIPPED | OUTPATIENT
Start: 2025-02-28 | End: 2025-03-14

## 2025-02-28 RX ORDER — ESTRADIOL 0.1 MG/G
1 CREAM VAGINAL DAILY
Status: DISCONTINUED | OUTPATIENT
Start: 2025-02-28 | End: 2025-02-28 | Stop reason: HOSPADM

## 2025-02-28 RX ORDER — ACETAMINOPHEN 650 MG/1
650 SUPPOSITORY RECTAL EVERY 6 HOURS PRN
Status: DISCONTINUED | OUTPATIENT
Start: 2025-02-28 | End: 2025-02-28 | Stop reason: HOSPADM

## 2025-02-28 RX ADMIN — DILTIAZEM HYDROCHLORIDE 120 MG: 120 CAPSULE, COATED, EXTENDED RELEASE ORAL at 11:44

## 2025-02-28 RX ADMIN — Medication 1 TABLET: at 07:55

## 2025-02-28 RX ADMIN — DILTIAZEM HYDROCHLORIDE 5 MG/HR: 5 INJECTION, SOLUTION INTRAVENOUS at 01:32

## 2025-02-28 RX ADMIN — DILTIAZEM HYDROCHLORIDE 5 MG: 5 INJECTION, SOLUTION INTRAVENOUS at 01:30

## 2025-02-28 RX ADMIN — SODIUM CHLORIDE 1000 ML: 0.9 INJECTION, SOLUTION INTRAVENOUS at 00:42

## 2025-02-28 RX ADMIN — APIXABAN 5 MG: 5 TABLET, FILM COATED ORAL at 07:55

## 2025-02-28 RX ADMIN — BACLOFEN 5 MG: 10 TABLET ORAL at 14:47

## 2025-02-28 RX ADMIN — PANTOPRAZOLE SODIUM 40 MG: 40 TABLET, DELAYED RELEASE ORAL at 07:55

## 2025-02-28 RX ADMIN — SODIUM CHLORIDE, PRESERVATIVE FREE 10 ML: 5 INJECTION INTRAVENOUS at 07:58

## 2025-02-28 RX ADMIN — GABAPENTIN 600 MG: 300 CAPSULE ORAL at 14:47

## 2025-02-28 RX ADMIN — BACLOFEN 5 MG: 10 TABLET ORAL at 07:54

## 2025-02-28 RX ADMIN — TIOTROPIUM BROMIDE INHALATION SPRAY 2 PUFF: 3.12 SPRAY, METERED RESPIRATORY (INHALATION) at 10:02

## 2025-02-28 RX ADMIN — GABAPENTIN 600 MG: 300 CAPSULE ORAL at 07:55

## 2025-02-28 RX ADMIN — PANTOPRAZOLE SODIUM 40 MG: 40 TABLET, DELAYED RELEASE ORAL at 14:47

## 2025-02-28 RX ADMIN — ATENOLOL 100 MG: 50 TABLET ORAL at 07:54

## 2025-02-28 RX ADMIN — SERTRALINE 100 MG: 100 TABLET, FILM COATED ORAL at 07:54

## 2025-02-28 RX ADMIN — METOPROLOL TARTRATE 5 MG: 5 INJECTION INTRAVENOUS at 00:42

## 2025-02-28 RX ADMIN — ROSUVASTATIN 40 MG: 40 TABLET, FILM COATED ORAL at 07:54

## 2025-02-28 ASSESSMENT — LIFESTYLE VARIABLES
HOW MANY STANDARD DRINKS CONTAINING ALCOHOL DO YOU HAVE ON A TYPICAL DAY: PATIENT DOES NOT DRINK
HOW OFTEN DO YOU HAVE A DRINK CONTAINING ALCOHOL: NEVER
HOW MANY STANDARD DRINKS CONTAINING ALCOHOL DO YOU HAVE ON A TYPICAL DAY: PATIENT DOES NOT DRINK
HOW OFTEN DO YOU HAVE A DRINK CONTAINING ALCOHOL: NEVER

## 2025-02-28 ASSESSMENT — PAIN DESCRIPTION - DESCRIPTORS: DESCRIPTORS: ACHING;DISCOMFORT

## 2025-02-28 ASSESSMENT — PAIN SCALES - GENERAL: PAINLEVEL_OUTOF10: 3

## 2025-02-28 ASSESSMENT — PAIN DESCRIPTION - LOCATION: LOCATION: BACK

## 2025-02-28 ASSESSMENT — PAIN DESCRIPTION - ORIENTATION: ORIENTATION: LOWER

## 2025-02-28 NOTE — FLOWSHEET NOTE
Sleeping, woke up for afternoon medications. Denies chest pain currently. Up to bathroom with assist. Gait steady. Denies SOB currently . Returned to bed. Call light within reach. Continue to monitor

## 2025-02-28 NOTE — PROGRESS NOTES
Physical Therapy  Facility/Department: [facility] ICU  Physical Therapy Initial Assessment    Name: Grazyna Rojas  : 1970  MRN: [id number]  Date of Service: 2025    Discharge Recommendations:  Continue to assess pending progress, Home independently, Home with assist PRN          Patient Diagnosis(es): The primary encounter diagnosis was Atrial fibrillation with rapid ventricular response (HCC). Diagnoses of New onset a-fib (HCC) and Atrial fibrillation, unspecified type (HCC) were also pertinent to this visit.  Past Medical History:  has a past medical history of Anxiety, Bipolar affective disorder (HCC), Chronic low back pain, COPD (chronic obstructive pulmonary disease) (HCC), Depression, Emphysema lung (HCC), Gastroesophageal reflux disease with esophagitis without hemorrhage, Hyperlipidemia, Hypertension, Irritable bowel syndrome, Osteoarthritis, Prediabetes, and Schizophrenia, schizo-affective type, depressed ([facility]).  Past Surgical History:  has a past surgical history that includes Abdomen surgery (); Tonsillectomy (); Appendectomy (); Cholecystectomy (); Hysterectomy, total abdominal (); Cardiac catheterization (); [place] tooth extraction; Spinal Cord Stimulator Surgery (2020); Colonoscopy (2021); Upper gastrointestinal endoscopy (2021); Colonoscopy (N/A, 2021); Upper gastrointestinal endoscopy (N/A, 2021); Upper gastrointestinal endoscopy (N/A, 2023); Upper gastrointestinal endoscopy (2023); and Esophagogastroduodenoscopy (2023).    Assessment  Assessment: Pt is a 54 y.o. currently admitted for chest tightness and palpitations. Upon exam pt is Ind with bed mobility and transfers and SUP for ambulation. Pt ambulated 75' in room with no AD and SUP with min SOB. Pt demos good B LE strength and balance and no further PT is recomended at this time.  Treatment Diagnosis: heart palpitations  Therapy Prognosis: Good  Decision Making: Low  to Right: Independent  Supine to Sit: Independent  Sit to Supine: Independent  Scooting: Independent  Transfers  Sit to Stand: Independent  Stand to Sit: Independent  Bed to Chair: Independent  Ambulation  WB Status: fwb  Ambulation  Surface: Level tile  Device: No Device  Assistance: Supervision  Quality of Gait: Pt ambulated 75' in room with no AD and SUP with min SOB.     Balance  Posture: Good  Sitting - Static: Good  Sitting - Dynamic: Good  Standing - Static: Good  Standing - Dynamic: Good            AM-PAC - Mobility         AM-PAC Mobility without Stair Climbing Inpatient   How much difficulty turning over in bed?: None  How much difficulty sitting down on / standing up from a chair with arms?: None  How much difficulty moving from lying on back to sitting on side of bed?: None  How much help from another person moving to and from a bed to a chair?: None  How much help from another person needed to walk in hospital room?: A Little  AM-PAC Inpatient Mobility without Stair Climbing Raw Score : 19  AM-PAC Inpatient without Stair Climbing T-Scale Score : 56.04  Mobility Inpatient CMS 0-100% Score: 12.25  Mobility Inpatient without Stair CMS G-Code Modifier : CI      Education  Patient Education  Education Given To: Patient  Education Provided Comments: Pt edu: PT POC      Therapy Time   Individual Concurrent Group Co-treatment   Time In 1030         Time Out 1043         Minutes 13         Timed Code Treatment Minutes: 13 Minutes       HALEY BARBOUR PT,DPT

## 2025-02-28 NOTE — PLAN OF CARE
Problem: Discharge Planning  Goal: Discharge to home or other facility with appropriate resources  Outcome: Progressing  Flowsheets (Taken 2/28/2025 0329 by Chey Hunter, RN)  Discharge to home or other facility with appropriate resources:   Identify barriers to discharge with patient and caregiver   Arrange for interpreters to assist at discharge as needed   Arrange for needed discharge resources and transportation as appropriate   Refer to discharge planning if patient needs post-hospital services based on physician order or complex needs related to functional status, cognitive ability or social support system   Identify discharge learning needs (meds, wound care, etc)     Problem: Safety - Adult  Goal: Free from fall injury  Outcome: Progressing     Problem: Nutrition Deficit:  Goal: Optimize nutritional status  2/28/2025 0851 by Grazyna Patrick, RN  Outcome: Progressing  2/28/2025 0809 by Shaun Blount RD, LD  Outcome: Progressing  Flowsheets (Taken 2/28/2025 0809)  Nutrient intake appropriate for improving, restoring, or maintaining nutritional needs:   Recommend appropriate diets, oral nutritional supplements, and vitamin/mineral supplements   Monitor oral intake, labs, and treatment plans

## 2025-02-28 NOTE — PROGRESS NOTES
Pt requests bandaid, states she picks scabs when she sleeps. LLE with anterior unroofed scab, pt states she fell asleep with a cigarette. Area cleansed and applied bandaid. Bed alarm on and call light in reach.

## 2025-02-28 NOTE — PROGRESS NOTES
INTENSIVE CARE UNIT   APRN - Progress Note    Patient - Grazyna Rojas  Date of Admission -  2/28/2025 12:25 AM  Date of Evaluation -  2/28/2025  Hospital Day - 0      SUBJECTIVE:     The Grazyna Rojas is a 54 y.o. female who is seen for follow up in the ICU.  Per nursing report and notes, overnight events include: no significant events.  She sitting up in chair alert oriented no acute distress.  Overall feels better.  No further palpitations.  Shortness of breath is improved.  We discussed smoking and possible causes.  We discussed stress in life.      ROS:   Constitutional: negative  for fevers, and negative for chills.  Respiratory: negative for shortness of breath, negative for cough, and negative for wheezing  Cardiovascular: negative for chest pain, and negative for palpitations  Gastrointestinal: negative for abdominal pain, negative for nausea,negative for vomiting, negative for diarrhea, and negative for constipation    All other systems were reviewed with the patient and are negative unless otherwise stated in HPI.      OBJECTIVE:     VITAL SIGNS:  Patient Vitals for the past 8 hrs:   BP Temp Temp src Pulse Resp SpO2 Height Weight   02/28/25 1000 (!) 142/81 -- -- 63 -- 96 % -- --   02/28/25 0919 (!) 169/69 -- -- -- -- -- 1.702 m (5' 7.01\") 101.9 kg (224 lb 10.4 oz)   02/28/25 0900 139/62 -- -- 71 18 92 % -- --   02/28/25 0800 (!) 169/89 -- -- 73 18 93 % -- --   02/28/25 0700 (!) 164/80 -- -- 67 13 95 % -- --   02/28/25 0645 (!) 165/87 97.6 °F (36.4 °C) Temporal 64 17 92 % -- --   02/28/25 0600 (!) 164/73 -- -- 59 14 94 % -- --   02/28/25 0558 -- -- -- -- -- -- 1.702 m (5' 7\") --   02/28/25 0500 (!) 157/79 -- -- 70 12 96 % -- --   02/28/25 0400 (!) 144/81 -- -- 69 19 95 % -- --   02/28/25 0330 (!) 149/93 -- -- 71 15 95 % 1.702 m (5' 7\") 101.9 kg (224 lb 10.4 oz)   02/28/25 0315 (!) 153/118 97.6 °F (36.4 °C) Temporal 81 18 93 % -- --   02/28/25 0300 131/75 -- -- 75 20 90 % -- --   02/28/25 0254 128/72 -- --  04/24/2024 11:00 AM    MUCUS 2+ 04/24/2024 11:00 AM    TRICHOMONAS NOT REPORTED 02/22/2021 12:14 PM    YEAST NOT REPORTED 02/22/2021 12:14 PM    BACTERIA 1+ 04/24/2024 11:00 AM    CLARITYU clear 02/23/2024 02:52 PM    SPECGRAV 1.030 02/23/2024 02:52 PM    LEUKOCYTESUR NEGATIVE 04/24/2024 11:00 AM    UROBILINOGEN Normal 04/24/2024 11:00 AM    BILIRUBINUR NEGATIVE 04/24/2024 11:00 AM    BILIRUBINUR neg 02/23/2024 02:52 PM    BLOODU neg 02/23/2024 02:52 PM    GLUCOSEU NEGATIVE 04/24/2024 11:00 AM    KETUA NEGATIVE 04/24/2024 11:00 AM    AMORPHOUS NOT REPORTED 02/22/2021 12:14 PM       HgBA1c:    Lab Results   Component Value Date/Time    LABA1C 6.2 11/18/2024 10:00 AM       TSH:    Lab Results   Component Value Date/Time    TSH 2.20 02/28/2025 12:21 AM       Lactic Acid: No results found for: \"LACTA\"     High Sensitivity Troponin:  Recent Labs     02/28/25  0021 02/28/25  0550   TROPHS <6 <6     Pro-BNP:  Lab Results   Component Value Date    PROBNP 189 (H) 02/28/2025     D-Dimer:No results found for: \"DDIMER\"  PT/INR:  No results found for: \"PROTIME\", \"INR\"  PTT:  No results found for: \"APTT\"    CRP: No results for input(s): \"CRP\" in the last 72 hours.    ABGs:   No results found for: \"PHART\", \"PH\", \"HFT8DGN\", \"PCO2\", \"PO2ART\", \"PO2\", \"JAN0WTT\", \"HCO3\", \"BEART\", \"BE\", \"THGBART\", \"THB\", \"PON9CUG\", \"I2CTUPZF\", \"O2SAT\", \"FIO2\"      Radiology/Imaging:  XR CHEST PORTABLE   Final Result   No acute pulmonary findings.               ASSESSMENT / PLAN:     Primary Problem(s): Atrial fibrillation with RVR (HCC)  Differential diagnoses: Other arrhythmia  Condition is an acute or chronic illness or injury that poses threat to life or bodily function  Condition is stable  Treatment plan:   Admit to ICU  Telemetry monitoring  Monitor labs and replace electrolytes  Imaging: Echo ordered  Medications:   Stop Cardizem drip  Continue atenolol  Start Cardizem  mg daily  Continue apixaban-CHA2DS2-VASc 2  Start nicotine

## 2025-02-28 NOTE — CARE COORDINATION
Case Management Assessment  Initial Evaluation    Date/Time of Evaluation: 2/28/2025 11:03 AM  Assessment Completed by: Reilly Yip RN    If patient is discharged prior to next notation, then this note serves as note for discharge by case management.    Patient Name: Grazyna Rojas                   YOB: 1970  Diagnosis: New onset a-fib (HCC) [I48.91]  Atrial fibrillation with rapid ventricular response (HCC) [I48.91]  Atrial fibrillation with RVR (HCC) [I48.91]                   Date / Time: 2/28/2025 12:25 AM    Patient Admission Status: Inpatient   Readmission Risk (Low < 19, Mod (19-27), High > 27): Readmission Risk Score: 5    Current PCP: Alexis Maldonado APRN - CNP  PCP verified by CM? (P) Yes    Chart Reviewed: Yes      History Provided by: (P) Patient  Patient Orientation: (P) Alert and Oriented, Person, Place    Patient Cognition: (P) Alert    Hospitalization in the last 30 days (Readmission):  No    If yes, Readmission Assessment in  Navigator will be completed.    Advance Directives:      Code Status: Full Code   Patient's Primary Decision Maker is: (P) Legal Next of Kin      Discharge Planning:    Patient lives with: (P) Spouse/Significant Other Type of Home: (P) House  Primary Care Giver: (P) Self  Patient Support Systems include: (P) Spouse/Significant Other, Children, Friends/Neighbors   Current Financial resources: (P) Medicare, Other (Comment) (Commercial secondary.)  Current community resources: (P) None  Current services prior to admission: (P) None            Current DME:              Type of Home Care services:  (P) None    ADLS  Prior functional level: (P) Independent in ADLs/IADLs  Current functional level: (P) Independent in ADLs/IADLs    PT AM-PAC:   /24  OT AM-PAC:   /24    Family can provide assistance at DC: (P) Yes  Would you like Case Management to discuss the discharge plan with any other family members/significant others, and if so, who? (P) No  Plans to

## 2025-02-28 NOTE — CARE COORDINATION
Message left with Tracy Sierra MA for Dr. Washington([facility] Neurosurgeon). Patient is scheduled for surgery 3/4/25 and is concerned procedure will need to be rescheduled. Requested office please call patient to verify the above information.

## 2025-02-28 NOTE — PROGRESS NOTES
Comprehensive Nutrition Assessment    Type and Reason for Visit:  Initial    Nutrition Recommendations/Plan:   Encourage oral intakes and resumption heart healthy eating.      Malnutrition Assessment:  Malnutrition Status:  No malnutrition (02/28/25 0807)    Context:  Acute Illness     Findings of the 6 clinical characteristics of malnutrition:  Energy Intake:  No decrease in energy intake  Weight Loss:  No weight loss     Body Fat Loss:  No body fat loss     Muscle Mass Loss:  No muscle mass loss    Fluid Accumulation:  No fluid accumulation     Strength:  Not Performed    Nutrition Assessment:    Obesity r/t excess energy intakes relative to expenditure, AEB BMI >35. Tries to monitor home diet for less red meat, fats and sodium. Prediabetes history noted and A1C 6.2. Taking breakfast well this AM and denies any ingestion issues or nutrition questions.    Nutrition Related Findings:    no edema. active b/s. Wound Type: None       Current Nutrition Intake & Therapies:    Average Meal Intake: Unable to assess (no PO records)  Average Supplements Intake: None Ordered  ADULT DIET; Regular    Anthropometric Measures:  Height: 170.2 cm (5' 7\")  Ideal Body Weight (IBW): 135 lbs (61 kg)    Admission Body Weight: 101.9 kg (224 lb 10.4 oz)  Current Body Weight: 101.9 kg (224 lb 10.4 oz), 166.4 % IBW. Weight Source: Bed scale  Current BMI (kg/m2): 35.2  Usual Body Weight: 100.3 kg (221 lb 3.2 oz) (11 days ago)     % Weight Change (Calculated): 1.6  Weight Adjustment For: No Adjustment                 BMI Categories: Obese Class 2 (BMI 35.0 -39.9)    Estimated Daily Nutrient Needs:  Energy Requirements Based On: Kcal/kg  Weight Used for Energy Requirements: Current  Energy (kcal/day): 0314-4927 (15-18)  Weight Used for Protein Requirements: Ideal  Protein (g/day): 74-86 (1.2-1.4)  Method Used for Fluid Requirements: 1 ml/kcal  Fluid (ml/day): 1800    Nutrition Diagnosis:   Overweight/obese related to excessive energy  intake as evidenced by BMI    Lab Results   Component Value Date     02/28/2025    K 3.9 02/28/2025     02/28/2025    CO2 29 02/28/2025    BUN 6 02/28/2025    CREATININE 0.5 02/28/2025    GLUCOSE 105 (H) 02/28/2025    CALCIUM 9.3 02/28/2025    BILITOT 0.4 11/18/2024    ALKPHOS 65 11/18/2024    AST 28 11/18/2024    ALT 37 (H) 11/18/2024    LABGLOM >90 02/28/2025    GFRAA >60 01/19/2021     Hemoglobin A1C   Date Value Ref Range Status   11/18/2024 6.2 (H) <5.7 % Final     Comment:              Prediabetes:  5.7% to 6.4%                             Diabetes:  >6.4%           Glycemic control for adults with diabetes:  <7.0%           Use with caution in patients with abnormal hemoglobin           variants as the half-life of red blood cells and in vivo           glycation rates are affected.       No results found for: \"VITD25\"    Nutrition Interventions:   Food and/or Nutrient Delivery: Modify Current Diet (maintain home diet)  Nutrition Education/Counseling: Education/Counseling initiated  Coordination of Nutrition Care: Continue to monitor while inpatient  Plan of Care discussed with: patient    Goals:  Goals: Meet at least 75% of estimated needs  Type of Goal: New goal  Previous Goal Met: New Goal    Nutrition Monitoring and Evaluation:   Behavioral-Environmental Outcomes: None Identified  Food/Nutrient Intake Outcomes: Food and Nutrient Intake  Physical Signs/Symptoms Outcomes: Biochemical Data, Weight    Discharge Planning:    No discharge needs at this time     Shaun Blount RD, LD  Contact: [phone]

## 2025-02-28 NOTE — DISCHARGE INSTR - COC
Continuity of Care Form    Patient Name: Grazyna Rojas   :  1970  MRN:  [id number]    Admit date:  2025  Discharge date:  ***    Code Status Order: Full Code   Advance Directives:   Advance Care Flowsheet Documentation             Admitting Physician:  Tenzin Stout MD  PCP: Alexis Maldonado, ANÍBAL Velazco CNP    Discharging Nurse: ***  Discharging Hospital Unit/Room#: I308/I308-01  Discharging Unit Phone Number: ***    Emergency Contact:   Extended Emergency Contact Information  Primary Emergency Contact: Lisa Rojas  Address: [address] [place] [address]  Home Phone: [phone]  Mobile Phone: [phone]  Relation: Spouse  Secondary Emergency Contact: JOSH LIZAMA  Home Phone: [phone]  Relation: Child   needed? No    Past Surgical History:  Past Surgical History:   Procedure Laterality Date    ABDOMEN SURGERY      exploratory lap, ovarian tubal abscess rupture    APPENDECTOMY      CARDIAC CATHETERIZATION      NORMAL    CHOLECYSTECTOMY      COLONOSCOPY  2021    COLONOSCOPY N/A 2021    COLONOSCOPY POLYPECTOMY REMOVAL SNARE/STOMA performed by Gino Zapata MD at [facility] OR    ESOPHAGOGASTRODUODENOSCOPY  2023    -bx,dilation    HYSTERECTOMY, TOTAL ABDOMINAL (CERVIX REMOVED)   of right ovary retained    SPINAL CORD STIMULATOR SURGERY  2020    [facility].    TONSILLECTOMY  1974    UPPER GASTROINTESTINAL ENDOSCOPY  2021    UPPER GASTROINTESTINAL ENDOSCOPY N/A 2021    EGD BIOPSY performed by Gino Zapata MD at [facility] OR    UPPER GASTROINTESTINAL ENDOSCOPY N/A 2023    EGD BIOPSY performed by Christina Kenney MD at [facility] OR    UPPER GASTROINTESTINAL ENDOSCOPY  2023    EGD ESOPHAGOGASTRODUODENOSCOPY DILATATION performed by Christina Kenney MD at [facility] OR    WISDOM TOOTH EXTRACTION         Immunization History:   Immunization History   Administered Date(s) Administered  Manager/ signature: {Esignature:781210810}    PHYSICIAN SECTION    Prognosis: {Prognosis:5599356964}    Condition at Discharge: { Patient Condition:985867098}    Rehab Potential (if transferring to Rehab): {Prognosis:7109733819}    Recommended Labs or Other Treatments After Discharge: ***    Physician Certification: I certify the above information and transfer of Grazyna Rojas  is necessary for the continuing treatment of the diagnosis listed and that she requires {Admit to Appropriate Level of Care:02440} for {GREATER/LESS:265595056} 30 days.     Update Admission H&P: {CHP DME Changes in HandP:362699246}    PHYSICIAN SIGNATURE:  Electronically signed by Tenzin Stout MD on 2/28/25 at 8:06 AM EST

## 2025-02-28 NOTE — CONSULTS
obstructive pulmonary disease) (HCC)     Depression     Emphysema lung (HCC)     Gastroesophageal reflux disease with esophagitis without hemorrhage     Dr. Guillermo    Hyperlipidemia     Hypertension     Irritable bowel syndrome     Osteoarthritis     Prediabetes     Schizophrenia, schizo-affective type, depressed (HCC)     has had ECT tx in past       CURRENT ALLERGIES: Bee venom, Sulfa antibiotics, and Ciprofloxacin hcl REVIEW OF SYSTEMS: 14 systems were reviewed. Pertinent positives and negatives as above, all else negative.     Past Surgical History:   Procedure Laterality Date    ABDOMEN SURGERY  1988    exploratory lap, ovarian tubal abscess rupture    APPENDECTOMY  1995    CARDIAC CATHETERIZATION  2013    NORMAL    CHOLECYSTECTOMY  1995    COLONOSCOPY  03/02/2021    COLONOSCOPY N/A 03/02/2021    COLONOSCOPY POLYPECTOMY REMOVAL SNARE/STOMA performed by Gino Zapata MD at [facility] OR    ESOPHAGOGASTRODUODENOSCOPY  07/25/2023    -bx,dilation    HYSTERECTOMY, TOTAL ABDOMINAL (CERVIX REMOVED)  2003 1/2 of right ovary retained    SPINAL CORD STIMULATOR SURGERY  01/30/2020    [facility].    TONSILLECTOMY  1974    UPPER GASTROINTESTINAL ENDOSCOPY  03/02/2021    UPPER GASTROINTESTINAL ENDOSCOPY N/A 03/02/2021    EGD BIOPSY performed by Gino Zapata MD at [facility] OR    UPPER GASTROINTESTINAL ENDOSCOPY N/A 07/25/2023    EGD BIOPSY performed by Christina Kenney MD at [facility] OR    UPPER GASTROINTESTINAL ENDOSCOPY  07/25/2023    EGD ESOPHAGOGASTRODUODENOSCOPY DILATATION performed by Christina Kenney MD at [facility] OR    WISDOM TOOTH EXTRACTION      Social History:  Social History     Tobacco Use    Smoking status: Every Day     Current packs/day: 1.00     Average packs/day: 1 pack/day for 23.0 years (23.0 ttl pk-yrs)     Types: Cigarettes    Smokeless tobacco: Never   Vaping Use    Vaping status: Never Used   Substance Use Topics    Alcohol use: Not Currently    Drug use: Not Currently     Types: Marijuana

## 2025-02-28 NOTE — DISCHARGE SUMMARY
Discharge Summary    Grazyna Rojas  :  1970  MRN:  [id number]    Admit date:  2025      Discharge date: 2025     Admitting Physician:  Tenzin Stout MD    Discharge Diagnoses:    Principal Problem:    Atrial fibrillation with RVR ([facility])  Resolved Problems:    * No resolved hospital problems. *      Hospital Course:   Grazyna Rojas is a 54 y.o. female admitted with atrial fibrillation with RVR.  She presented with complaints of palpitations.  Patient woke up last evening and felt her heart racing.  She stated normally that would go away after a few seconds but this time it did not.  She denied any chest pain.  She did report some shortness of breath.  She stated she has been under a large amount of stress recently as her father is dying and she has an upcoming back surgery.  Upon arrival to the emergency room her heart rate was 157 and blood pressure was elevated at 183/115.  EKG showed atrial fibrillation with RVR with a heart rate of 178.  Patient was given IV Lopressor initially without improvement.  She was then began on Cardizem drip and heart rate improved.  Lab work was unremarkable.  Chest x-ray was negative.  Patient was admitted to intensive care cardiology was consulted and she was placed on Cardizem drip.  Patient converted to normal sinus rhythm and was placed on oral Cardizem and continued on atenolol.  She was also started on Eliquis and will continue on discharge.  After cardiology evaluation Cardizem was stopped and she will be discharged home on amiodarone 200 mg twice daily for 2 weeks.  She will have an outpatient stress test and she will follow-up with Dr. Greene as an outpatient for further antiarrhythmia treatment.  I will ask that she have labs next week.  I also gave prescription for nicotine patch.  Hemodynamically she is stable discharge today    Consultants:  Dr. Soriano, cardiology    Procedures: none    Complications: none    Discharge Condition: fair    Exam:  GEN:    Awake and  HISTORY: ORDERING SYSTEM PROVIDED HISTORY: chest pain TECHNOLOGIST PROVIDED HISTORY: chest pain FINDINGS: Lungs: Low lung volumes with bronchovascular crowding.  No focal consolidation. Pleura: No effusion or pneumothorax. Cardiomediastinal silhouette: Normal contours. Bones: No acute bony findings.  Partially visualized spinal stimulator. Soft tissues: Normal.     No acute pulmonary findings.       Assessment and Plan:  Patient Active Problem List    Diagnosis Date Noted    Post-laminectomy syndrome 06/07/2022    Pain in thoracic spine 06/07/2022    Neurogenic claudication 06/07/2022    Lumbar radiculopathy 06/07/2022    Gastroesophageal reflux disease 05/23/2022    Atrial fibrillation with RVR ([facility]) 02/28/2025    Chronic obstructive pulmonary disease, unspecified 07/18/2024    Mixed incontinence 04/24/2024    Chronic pain disorder 05/22/2023    Epigastric pain 03/02/2021        Discharge Medications:         Medication List        START taking these medications      amiodarone 100 MG tablet  Commonly known as: PACERONE  Take 2 tablets by mouth 2 times daily for 14 days     apixaban 5 MG Tabs tablet  Commonly known as: ELIQUIS  Take 1 tablet by mouth 2 times daily     nicotine 21 MG/24HR  Commonly known as: NICODERM CQ  Place 1 patch onto the skin daily  Start taking on: March 1, 2025            CONTINUE taking these medications      albuterol sulfate  (90 Base) MCG/ACT inhaler  Commonly known as: Ventolin HFA  Inhale 2 puffs into the lungs 4 times daily as needed for Wheezing     atenolol 100 MG tablet  Commonly known as: TENORMIN  TAKE 1 TABLET BY MOUTH DAILY     Baclofen 5 MG tablet  Commonly known as: LIORESAL     buprenorphine 15 MCG/HR Ptwk  Commonly known as: BUPRENEX     EPINEPHrine 0.3 MG/0.3ML Soaj injection  Commonly known as: EpiPen 2-Kb  Use as directed for allergic reaction     estradiol 0.1 MG/GM vaginal cream  Commonly known as: ESTRACE VAGINAL  Place 1 g vaginally daily Place a pea-sized

## 2025-02-28 NOTE — RT PROTOCOL NOTE
RESPIRATORY ASSESSMENT PROTOCOL                                                                                              Patient Name: Grazyna Rojas Room#: I308/I308-01 : 1970     Admitting diagnosis: New onset a-fib (HCC) [I48.91]  Atrial fibrillation with rapid ventricular response (HCC) [I48.91]  Atrial fibrillation with RVR (HCC) [I48.91]       Medical History:   Past Medical History:   Diagnosis Date    Anxiety     Bipolar affective disorder (HCC)     Chronic low back pain     [facility]. [facility] neurosurgery    COPD (chronic obstructive pulmonary disease) (HCC)     Depression     Emphysema lung (HCC)     Gastroesophageal reflux disease with esophagitis without hemorrhage     Dr. Guillermo    Hyperlipidemia     Hypertension     Irritable bowel syndrome     Osteoarthritis     Prediabetes     Schizophrenia, schizo-affective type, depressed (HCC)     has had ECT tx in past       PATIENT ASSESSMENT    LABORATORY DATA  Hematology:   Lab Results   Component Value Date/Time    WBC 7.1 2025 12:21 AM    RBC 4.76 2025 12:21 AM    RBC 4.76 2023 08:35 AM    HGB 14.8 2025 12:21 AM    HCT 42.3 2025 12:21 AM     2025 12:21 AM     Chemistry:  No results found for: \"PHART\", \"QSK8RES\", \"PO2ART\", \"D6URUKYI\", \"ZKL3HGY\", \"PBEA\", \"NBEA\"    VITALS  Pulse: 70   Respirations: 12  BP: (!) 157/79  SpO2: 96 % O2 Device: None (Room air)  Temp: 97.6 °F (36.4 °C)    SKIN COLOR  [x] Normal  [] Pale  [] Dusky  [] Cyanotic    RESPIRATORY PATTERN  [x] Normal  [] Dyspnea  [] Cheyne-Sutherland  [] Kussmaul  [] Biots    AMBULATORY  [] Yes  [] No  [] With Assistance    PEAK FLOW  Predicted:     Personal Best:            Patient Acuity 0 1 2 3 4 Score   Level of Consciousness (LOC) [x]  Alert & Oriented or Pt normal LOC []  Confused;follows directions []  Confused & uncooper-ative []  Obtunded []  Comatose 0   Respiratory Rate  (RR) [x]  Reg. rate & pattern. 12 - 20 bpm  []  Increased RR. Greater than 20  and pt. can use MP. Notify physician if condition deteriorates.  HHN AEROSOL THERAPY with  [physician-ordered bronchodilator(s)]  QID and Albuterol PRN q4 hrs.   Breath-Actuated Neb if BBS Acuity = 4, and pt. can use MP.  Notify physician if condition deteriorates. MDI THERAPY with  2 actuations of [physician-ordered bronchodilator(s)] via spacer TID Albuterol and PRN q4 hrs.   If unable to utilize MDI: HHN [physician-ordered bronchodilator(s)] TID and Albuterol PRN q4 hrs.   Notify physician if condition deteriorates. MDI THERAPY with  [physician-ordered bronchodilator(s)] via spacer TID PRN.   If unable to utilize MDI: HHN [physician-ordered bronchodilator(s)] TID PRN.   Notify physician if condition deteriorates.         If Acuity Level is 2, 3, or 4 in any of the following:    [] COUGH     [] SURGICAL HISTORY (SURG HX)  [] CHEST XRAY (CXR)    Goal: Improvement in sputum mobilization in patients with ineffective airway clearance. Reverse atelectasis.    [x] Bronchopulmonary Hygiene Protocol      Total Acuity:   14-28  []  Secondary Assessment in 24 hrs Total Acuity:  9-13  []  Secondary Assessment in 24 hrs Total Acuity:  4-8  []  Secondary Assessment in 24 hrs Total Acuity:  0-3  [x]  Secondary Assessment in 48 hrs   METANEB QID with [physician-ordered bronchodilator(s)] if CXR Acuity = 4; otherwise:  PD&P, Oscillatory Therapy, or Vest QID & PRN AND PEP QID & PRN  NT Sxn PRN for ineffective cough  METANEB QID with [physician-ordered bronchodilator(s)] if CXR Acuity = 4; otherwise:  PD&P, Oscillatory Therapy or Vest QID & PRN AND PEP QID & PRN  NT Sxn PRN for ineffective cough  PD&P, Oscillatory Therapy, or Vest TID & PRN AND PEP TID & PRN   Instruct patient to self-perform IS q1hr WA       If Acuity Level is 2 or above in the following:    [x] PULMONARY HISTORY (PULM HX)    Goal: Assist patient in quitting smoking to slow or stop the progression of lung disease.    [x] Smoking Cessation Protocol    SMOKING

## 2025-02-28 NOTE — ED PROVIDER NOTES
[facility]  EMERGENCY DEPARTMENT ENCOUNTER        Pt Name: Grazyna Rojas  MRN: [id number]  Birthdate 1970  Date of evaluation: 2/28/2025  Provider: Kristina Salas MD  PCP: Alexis Maldonado APRN - JOSE  Note Started: 2:24 AM EST 2/28/25    CHIEF COMPLAINT       Chief Complaint   Patient presents with    Palpitations     Patient arrives via EMS for complaints of feeling her heart racing. States that she has been under recent stress       HISTORY OF PRESENT ILLNESS: 1 or more Elements     Grazyna Rojas is a 54 y.o. female who presents palpitations states that she felt like her heart was racing.  States that this is the first time if she felt this way states that she felt unwell overall within addition of chest pressure.  She denies any precipitating factors.  States she drinks a cup of coffee a day, denies any drug use.  She has been on there is recent stress as her father is currently dying under hospice and she has been dealing with a lot of issues with family dynamics.  Denies any cardiac history.  Denies any history of atrial fibrillation.  Patient is not on any anticoagulations  Denies any fever, chills, n/v, headache, dizziness, vision changes, neck tenderness or stiffness, weakness,  leg swelling/tenderness, sob, cough, abd pain, dysuria, hematuria, diarrhea, constipation, bloody stools.    Nursing Notes were all reviewed and agreed with or any disagreements were addressed in the HPI.    ROS:   Pertinent positives and negatives are stated within HPI, all other systems reviewed and are negative.      --------------------------------------------- PAST HISTORY ---------------------------------------------  Past Medical History:  has a past medical history of Anxiety, Bipolar affective disorder (HCC), Chronic low back pain, COPD (chronic obstructive pulmonary disease) (HCC), Depression, Emphysema lung (HCC), Gastroesophageal reflux disease with esophagitis without hemorrhage,    Kristina Salas MD  02/28/25 0228       Kristina Salas MD  02/28/25 0254

## 2025-02-28 NOTE — FLOWSHEET NOTE
Awake, resting in bed. Vitals checked and assessment completed. Denies pain currently. States occasionally SOB at rest and with activity. Denies SOB currently. Continues sinus rhythm on telemetry. No needs at present time. Call light within reach. Continue to monitor

## 2025-02-28 NOTE — H&P
[facility] Data:   Test interpretation:  My independent EKG interpretation: atrial fibrillation, rate 178  My independent X-ray interpretation:  CXR shows no acute process  Management and/or test interpretation discussed with ER MD at time of admission  Consults and Nursing notes were personally reviewed, all current labs and imaging were personally reviewed, tests ordered: CBC, BMP, and history obtained by independent historian       Disposition:  Shared decision making: All test results, treatment options and disposition options were discussed with the patient today  Social determinants of health that may impact management: none  Code status: Full Code   Disposition: Discharge plan is home        Critical Care Time:  Total critical care time caring for this patient with life threatening, unstable organ failure, including direct patient contact, management of life support systems, review of data including imaging and labs, discussions with other team members and physicians at least 0 minutes so far today, excluding separately billable procedures.      [facility] Medication Reconciliation documentation:    [x] I have utilized all available immediate resources to obtain, update, or review the patient's current medications (including all prescriptions, over-the-counter products, herbals, cannabinoid products and bitamin/mineral/dietary/nutritional supplements.  [If 'yes\", STOP HERE]     []  The patient is not eligible for medication reconciliation; the patient is in an emergent medical situation where delaying treatment would jeopardize the patient's health    []  I did NOT confirm, update or review the patient's current list of medications today.  [DOES NOT SATISFY [facility] PERFORMANCE]        [facility] Advanced Care Planning documentation:  [x] I have confirmed that the patient's Advance Care Plan is present, Code Status is documented, or surrogate decision maker is listed in the patient's medical record  [If \"yes\", STOP HERE]     []  The patient's Advance Care Plan is NOT present because:    []  I confirmed today that the patient does not wish or was not able to name a   surrogate decision maker or provide and advance care plan.    [] Hospice care is currently being provided or has been provided within the   calendar year.    []  I did NOT confirm today the presence of an Advance Care Plan or surrogate   decision maker documented within the patient's medical record.   [DOES NOT SATISFY [facility] PERFORMANCE]    CORE MEASURES  DVT prophylaxis:  Eliquis  Decubitus ulcer present on admission: No  CODE STATUS: FULL CODE  Nutrition Status: good   Physical therapy: NA   Old Charts reviewed: Yes  EKG Reviewed: Yes  Advance Directive Addressed: Yes    ANÍBAL Parker - CNP, APRN, NP-C  2/28/2025, 2:01 AM

## 2025-03-01 PROBLEM — D68.69 HYPERCOAGULABLE STATE DUE TO PAROXYSMAL ATRIAL FIBRILLATION (HCC): Status: ACTIVE | Noted: 2025-03-01

## 2025-03-01 PROBLEM — I48.0 HYPERCOAGULABLE STATE DUE TO PAROXYSMAL ATRIAL FIBRILLATION (HCC): Status: ACTIVE | Noted: 2025-03-01

## 2025-03-01 NOTE — PROGRESS NOTES
Writer went over all discharge instructions and all questions answered. IV removed. Pt has all of her belongings. Pt is just waiting for her ride.

## 2025-03-01 NOTE — PROGRESS NOTES
Physician Progress Note      PATIENT:               PATRICK ESPINOZA  CSN #:                  [id number]  :                       1970  ADMIT DATE:       2025 12:25 AM  DISCH DATE:        2025 7:27 PM  RESPONDING  PROVIDER #:        Tenzin Stout MD          QUERY TEXT:    Patient admitted with afib with RVR and is maintained on Eliquis. If possible,   please document in progress notes and discharge summary if you are evaluating   and/or treating any of the following:?  ?  The medical record reflects the following:    Risk Factors: female, HTN  Clinical Indicators: maintained on Eliquis, [id number] 2 per H&P  Treatment: Bleeding precautions  Options provided:  -- Secondary hypercoagulable state in a patient with atrial fibrillation  -- Other - I will add my own diagnosis  -- Disagree - Not applicable / Not valid  -- Disagree - Clinically unable to determine / Unknown  -- Refer to Clinical Documentation Reviewer    PROVIDER RESPONSE TEXT:    This patient has secondary hypercoagulable state in a patient with atrial   fibrillation.    Query created by: Aniket Becerra on 2025 3:12 PM      Electronically signed by:  Tenzin Stout MD 2025 8:47 PM

## 2025-03-01 NOTE — PLAN OF CARE
Problem: Discharge Planning  Goal: Discharge to home or other facility with appropriate resources  2/28/2025 1900 by Breanna Everett, RN  Outcome: Completed     Problem: Safety - Adult  Goal: Free from fall injury  2/28/2025 1900 by Breanna Everett, RN  Outcome: Completed     Problem: Nutrition Deficit:  Goal: Optimize nutritional status  2/28/2025 1900 by Breanna Everett, RN  Outcome: Completed

## 2025-03-03 ENCOUNTER — CARE COORDINATION (OUTPATIENT)
Dept: CARE COORDINATION | Age: 55
End: 2025-03-03

## 2025-03-03 DIAGNOSIS — I48.91 ATRIAL FIBRILLATION WITH RAPID VENTRICULAR RESPONSE (HCC): Primary | ICD-10-CM

## 2025-03-03 NOTE — CARE COORDINATION
Care Transitions Note    Initial Call - Call within 2 business days of discharge: Yes    Patient Current Location:  Home: [address] [place] [id number]    Care Transition Nurse contacted the patient by telephone to perform post hospital discharge assessment, verified name and  as identifiers. Provided introduction to self, and explanation of the Care Transition Nurse role.     Patient: Grazyna Rojas    Patient : 1970   MRN: [id number]    Reason for Admission: A. Fib with RVR  Discharge Date: 25  RURS: Readmission Risk Score: 6.6      Last Discharge Facility       Date Complaint Diagnosis Description Type Department Provider    25 Palpitations Atrial fibrillation with rapid ventricular response (HCC) ... ED to Hosp-Admission (Discharged) (ADMITTED) [facility] ICU Tenzin Stout MD; Kristina Salas ...            Was this an external facility discharge? No    Additional needs identified to be addressed with provider   No needs identified             Method of communication with provider: none.    Patients top risk factors for readmission: medical condition-A. Fib    Interventions to address risk factors:   Education: Medications as ordered, get stress test, follow up with PCP and cardiology, monitor HR.     Care Summary Note: Spoke with patient today for initial 24 hour follow up call. Patient came in for palpitations and was found to be in A.Fib with RVR.  Patient was treated in the hospital, converted to NSR and discharged to home.  She was to have surgery to her neck which was scheduled for tomorrow but this is now on hold for now.  She states she has been under a lot of stress lately, is helping with her parents who both have cancer, states her Dad is under hospice care currently.  Today, she said she is feeling better, not experiencing palpitations and denies having chest pain.  She does get short of breath with exertion at times and at rest but not currently short of breath at present.  A. Fib is

## 2025-03-07 ENCOUNTER — HOSPITAL ENCOUNTER (OUTPATIENT)
Age: 55
Discharge: HOME | End: 2025-03-07
Payer: COMMERCIAL

## 2025-03-07 DIAGNOSIS — I48.91 ATRIAL FIBRILLATION WITH RAPID VENTRICULAR RESPONSE (HCC): ICD-10-CM

## 2025-03-07 LAB
ANION GAP SERPL CALCULATED.3IONS-SCNC: 9 MMOL/L (ref 9–16)
BASOPHILS # BLD: 0.03 K/UL (ref 0–0.2)
BASOPHILS NFR BLD: 0 % (ref 0–2)
BUN SERPL-MCNC: 15 MG/DL (ref 6–20)
BUN/CREAT SERPL: 21 (ref 9–20)
CALCIUM SERPL-MCNC: 9.6 MG/DL (ref 8.6–10.4)
CHLORIDE SERPL-SCNC: 101 MMOL/L (ref 98–107)
CO2 SERPL-SCNC: 28 MMOL/L (ref 20–31)
CREAT SERPL-MCNC: 0.7 MG/DL (ref 0.5–0.9)
EOSINOPHIL # BLD: 0.1 K/UL (ref 0–0.44)
EOSINOPHILS RELATIVE PERCENT: 1 % (ref 1–4)
ERYTHROCYTE [DISTWIDTH] IN BLOOD BY AUTOMATED COUNT: 11.9 % (ref 11.8–14.4)
GFR, ESTIMATED: >90 ML/MIN/1.73M2
GLUCOSE SERPL-MCNC: 103 MG/DL (ref 74–99)
HCT VFR BLD AUTO: 39.8 % (ref 36.3–47.1)
HGB BLD-MCNC: 14 G/DL (ref 11.9–15.1)
IMM GRANULOCYTES # BLD AUTO: <0.03 K/UL (ref 0–0.3)
IMM GRANULOCYTES NFR BLD: 0 %
LYMPHOCYTES NFR BLD: 3.85 K/UL (ref 1.1–3.7)
LYMPHOCYTES RELATIVE PERCENT: 37 % (ref 24–43)
MCH RBC QN AUTO: 30.9 PG (ref 25.2–33.5)
MCHC RBC AUTO-ENTMCNC: 35.2 G/DL (ref 28.4–34.8)
MCV RBC AUTO: 87.9 FL (ref 82.6–102.9)
MONOCYTES NFR BLD: 0.94 K/UL (ref 0.1–1.2)
MONOCYTES NFR BLD: 9 % (ref 3–12)
NEUTROPHILS NFR BLD: 53 % (ref 36–65)
NEUTS SEG NFR BLD: 5.58 K/UL (ref 1.5–8.1)
NRBC BLD-RTO: 0 PER 100 WBC
PLATELET # BLD AUTO: 232 K/UL (ref 138–453)
PMV BLD AUTO: 11 FL (ref 8.1–13.5)
POTASSIUM SERPL-SCNC: 4.2 MMOL/L (ref 3.7–5.3)
RBC # BLD AUTO: 4.53 M/UL (ref 3.95–5.11)
SODIUM SERPL-SCNC: 138 MMOL/L (ref 136–145)
WBC OTHER # BLD: 10.5 K/UL (ref 3.5–11.3)

## 2025-03-07 PROCEDURE — 85025 COMPLETE CBC W/AUTO DIFF WBC: CPT

## 2025-03-07 PROCEDURE — 36415 COLL VENOUS BLD VENIPUNCTURE: CPT

## 2025-03-07 PROCEDURE — 80048 BASIC METABOLIC PNL TOTAL CA: CPT

## 2025-03-11 ENCOUNTER — HOSPITAL ENCOUNTER (OUTPATIENT)
Dept: NUCLEAR MEDICINE | Age: 55
Discharge: HOME | End: 2025-03-13

## 2025-03-11 ENCOUNTER — HOSPITAL ENCOUNTER (OUTPATIENT)
Age: 55
Discharge: HOME | End: 2025-03-13

## 2025-03-11 DIAGNOSIS — I48.91 ATRIAL FIBRILLATION WITH RAPID VENTRICULAR RESPONSE (HCC): ICD-10-CM

## 2025-03-11 PROCEDURE — 93017 CV STRESS TEST TRACING ONLY: CPT

## 2025-03-11 PROCEDURE — 3430000000 HC RX DIAGNOSTIC RADIOPHARMACEUTICAL: Performed by: NURSE PRACTITIONER

## 2025-03-11 PROCEDURE — A9500 TC99M SESTAMIBI: HCPCS | Performed by: NURSE PRACTITIONER

## 2025-03-11 PROCEDURE — 6360000002 HC RX W HCPCS: Performed by: FAMILY MEDICINE

## 2025-03-11 RX ORDER — TETRAKIS(2-METHOXYISOBUTYLISOCYANIDE)COPPER(I) TETRAFLUOROBORATE 1 MG/ML
30 INJECTION, POWDER, LYOPHILIZED, FOR SOLUTION INTRAVENOUS
Status: COMPLETED | OUTPATIENT
Start: 2025-03-11 | End: 2025-03-11

## 2025-03-11 RX ORDER — REGADENOSON 0.08 MG/ML
0.4 INJECTION, SOLUTION INTRAVENOUS
Status: COMPLETED | OUTPATIENT
Start: 2025-03-11 | End: 2025-03-11

## 2025-03-11 RX ADMIN — Medication 30 MILLICURIE: at 10:51

## 2025-03-11 RX ADMIN — REGADENOSON 0.4 MG: 0.08 INJECTION, SOLUTION INTRAVENOUS at 11:00

## 2025-03-12 ENCOUNTER — HOSPITAL ENCOUNTER (OUTPATIENT)
Dept: NUCLEAR MEDICINE | Age: 55
Discharge: HOME | End: 2025-03-14

## 2025-03-12 LAB
NUC STRESS EJECTION FRACTION: 61 %
STRESS BASELINE DIAS BP: 78 MMHG
STRESS BASELINE HR: 52 BPM
STRESS BASELINE SYS BP: 140 MMHG
STRESS ESTIMATED WORKLOAD: 1 METS
STRESS PEAK DIAS BP: 62 MMHG
STRESS PEAK SYS BP: 120 MMHG
STRESS PERCENT HR ACHIEVED: 52 %
STRESS POST PEAK HR: 87 BPM
STRESS RATE PRESSURE PRODUCT: NORMAL BPM*MMHG
STRESS TARGET HR: 166 BPM

## 2025-03-12 PROCEDURE — A9500 TC99M SESTAMIBI: HCPCS | Performed by: NURSE PRACTITIONER

## 2025-03-12 PROCEDURE — 3430000000 HC RX DIAGNOSTIC RADIOPHARMACEUTICAL: Performed by: NURSE PRACTITIONER

## 2025-03-12 RX ORDER — TETRAKIS(2-METHOXYISOBUTYLISOCYANIDE)COPPER(I) TETRAFLUOROBORATE 1 MG/ML
30 INJECTION, POWDER, LYOPHILIZED, FOR SOLUTION INTRAVENOUS
Status: COMPLETED | OUTPATIENT
Start: 2025-03-12 | End: 2025-03-12

## 2025-03-12 RX ADMIN — Medication 30 MILLICURIE: at 13:45

## 2025-03-13 ENCOUNTER — CARE COORDINATION (OUTPATIENT)
Dept: CARE COORDINATION | Age: 55
End: 2025-03-13

## 2025-03-13 NOTE — TELEPHONE ENCOUNTER
[facility] nurse called and states that pt was discharged from hospital with a 2 week supply of amiodarone but her follow up appointment was made for a month after being in the hospital. Can we please send her a 2 week supply to [facility] pharmacy to get her by until her follow up appointment on 3/26? Thank you

## 2025-03-13 NOTE — CARE COORDINATION
Care Transitions Note    Follow Up Call     Patient Current Location:  Home: [address] [id number]    Care Transition Nurse contacted the patient by telephone. Verified name and  as identifiers.    Additional needs identified to be addressed with provider   No needs identified                 Method of communication with provider: phone.    Care Summary Note: Spoke with patient today for transitional follow up call. Patient came in last week with new onset A. Fib, patient was given medications at discharge and to follow up with cardiology.  She had her stress test done yesterday, results reviewed.  She has been doing ok this week, states she did cave in with the smoking, expressed to keep trying - she has been under a lot of stress recently with caring for parents.  She has not had any issues on the Eliquis, denies any blood in stool or excessive bruising.  She has follow up with cardiology at the end of  with Dr. Soriano.  She said today she will be out of Amiodarone before appointment, is asking today if she should continue.  Reviewed chart again, she was only given the 1 script for Amiodarone 100 mg with instruction to take 2 tabs BID x 14 days.  TC placed to cardiology office, patient is not yet established there but was seen in hospital.  Per office, they will send in script for additional Amiodarone to get her through to appointment.  Script going to [facility] in [place].  Patient was made aware that script should be available later tonight.  She denies any other needs or concerns at this time.     Plan of care updates since last contact:  None       Advance Care Planning:   Does patient have an Advance Directive: reviewed during previous call, see note. .    Medication Review:  Full medication reconciliation completed during previous call.    Remote Patient Monitoring:  Offered patient enrollment in the Remote Patient Monitoring (RPM) program for in-home monitoring: Patient is not eligible

## 2025-03-17 RX ORDER — AMIODARONE HYDROCHLORIDE 200 MG/1
200 TABLET ORAL DAILY
Qty: 14 TABLET | Refills: 0 | Status: SHIPPED | OUTPATIENT
Start: 2025-03-17 | End: 2025-03-31

## 2025-03-20 ENCOUNTER — CARE COORDINATION (OUTPATIENT)
Dept: CARE COORDINATION | Age: 55
End: 2025-03-20

## 2025-03-20 NOTE — CARE COORDINATION
Care Transitions Note    Follow Up Call     Patient Current Location:  Home: [address] [place] [id number]    Care Transition Nurse contacted the patient by telephone. Verified name and  as identifiers.    Additional needs identified to be addressed with provider   No needs identified                 Method of communication with provider: none.    Care Summary Note: Spoke with patient for transitional follow up call. She states she has been feeling ok this week, denies any chest pains, palpitations, shortness of breath but said she does feel more fatigued since coming home.  She is on Amiodarone, was given script for 2 wk supply but was going to run out last week and had no further direction on if she should be continuing.  I did reach out to Dr. Soriano's office last week and they sent in script for daily Amiodarone.  She is currently taking now, not having any new issues.  She has been under more stress recently, is caring for ill parents.  She has follow up with Dr. Soriano on Wednesday next week, denies any new needs at this time. Advised I will reach out after her f/u with cardiology, expressed to call if any new issues or concerns.     Plan of care updates since last contact:  None       Advance Care Planning:   Does patient have an Advance Directive: reviewed during previous call, see note. .    Medication Review:  Full medication reconciliation completed during previous call.    Remote Patient Monitoring:  Offered patient enrollment in the Remote Patient Monitoring (RPM) program for in-home monitoring: Patient is not eligible for RPM program because: patient does not have qualifying diagnosis.    Assessments:  Care Transitions Subsequent and Final Call    Schedule Follow Up Appointment with PCP: Completed  Subsequent and Final Calls  Do you have any ongoing symptoms?: Yes  Onset of Patient-reported symptoms: In the past 7 days  Patient-reported symptoms: Fatigue  Have your medications changed?: No  Do you

## 2025-03-24 DIAGNOSIS — I48.91 ATRIAL FIBRILLATION WITH RAPID VENTRICULAR RESPONSE (HCC): Primary | ICD-10-CM

## 2025-03-25 RX ORDER — AMIODARONE HYDROCHLORIDE 200 MG/1
200 TABLET ORAL DAILY
Qty: 30 TABLET | Refills: 3 | Status: SHIPPED | OUTPATIENT
Start: 2025-03-25 | End: 2026-03-20

## 2025-03-26 ENCOUNTER — HOSPITAL ENCOUNTER (OUTPATIENT)
Age: 55
Discharge: HOME | End: 2025-03-26
Payer: MEDICARE

## 2025-03-26 ENCOUNTER — OFFICE VISIT (OUTPATIENT)
Dept: CARDIOLOGY | Age: 55
End: 2025-03-26
Payer: MEDICARE

## 2025-03-26 VITALS
RESPIRATION RATE: 18 BRPM | HEIGHT: 67 IN | BODY MASS INDEX: 34.37 KG/M2 | WEIGHT: 219 LBS | DIASTOLIC BLOOD PRESSURE: 55 MMHG | HEART RATE: 55 BPM | SYSTOLIC BLOOD PRESSURE: 113 MMHG

## 2025-03-26 DIAGNOSIS — R53.83 FATIGUE, UNSPECIFIED TYPE: ICD-10-CM

## 2025-03-26 DIAGNOSIS — E78.2 MIXED HYPERLIPIDEMIA: ICD-10-CM

## 2025-03-26 DIAGNOSIS — I10 ESSENTIAL HYPERTENSION: ICD-10-CM

## 2025-03-26 DIAGNOSIS — I48.91 ATRIAL FIBRILLATION WITH RAPID VENTRICULAR RESPONSE (HCC): ICD-10-CM

## 2025-03-26 DIAGNOSIS — R94.39 ABNORMAL CARDIOVASCULAR STRESS TEST: Primary | ICD-10-CM

## 2025-03-26 DIAGNOSIS — Z71.6 TOBACCO ABUSE COUNSELING: ICD-10-CM

## 2025-03-26 LAB
ANION GAP SERPL CALCULATED.3IONS-SCNC: 9 MMOL/L (ref 9–16)
BUN SERPL-MCNC: 13 MG/DL (ref 6–20)
BUN/CREAT SERPL: 19 (ref 9–20)
CALCIUM SERPL-MCNC: 9.3 MG/DL (ref 8.6–10.4)
CHLORIDE SERPL-SCNC: 103 MMOL/L (ref 98–107)
CO2 SERPL-SCNC: 28 MMOL/L (ref 20–31)
CREAT SERPL-MCNC: 0.7 MG/DL (ref 0.5–0.9)
ERYTHROCYTE [DISTWIDTH] IN BLOOD BY AUTOMATED COUNT: 12.9 % (ref 11.8–14.4)
GFR, ESTIMATED: >90 ML/MIN/1.73M2
GLUCOSE SERPL-MCNC: 95 MG/DL (ref 74–99)
HCT VFR BLD AUTO: 39.8 % (ref 36.3–47.1)
HGB BLD-MCNC: 13.4 G/DL (ref 11.9–15.1)
MCH RBC QN AUTO: 30.5 PG (ref 25.2–33.5)
MCHC RBC AUTO-ENTMCNC: 33.7 G/DL (ref 28.4–34.8)
MCV RBC AUTO: 90.5 FL (ref 82.6–102.9)
NRBC BLD-RTO: 0 PER 100 WBC
PLATELET # BLD AUTO: 194 K/UL (ref 138–453)
PMV BLD AUTO: 11.4 FL (ref 8.1–13.5)
POTASSIUM SERPL-SCNC: 4.1 MMOL/L (ref 3.7–5.3)
RBC # BLD AUTO: 4.4 M/UL (ref 3.95–5.11)
SODIUM SERPL-SCNC: 140 MMOL/L (ref 136–145)
WBC OTHER # BLD: 9.4 K/UL (ref 3.5–11.3)

## 2025-03-26 PROCEDURE — 3074F SYST BP LT 130 MM HG: CPT | Performed by: FAMILY MEDICINE

## 2025-03-26 PROCEDURE — 36415 COLL VENOUS BLD VENIPUNCTURE: CPT

## 2025-03-26 PROCEDURE — 1111F DSCHRG MED/CURRENT MED MERGE: CPT | Performed by: FAMILY MEDICINE

## 2025-03-26 PROCEDURE — 80048 BASIC METABOLIC PNL TOTAL CA: CPT

## 2025-03-26 PROCEDURE — 99215 OFFICE O/P EST HI 40 MIN: CPT | Performed by: FAMILY MEDICINE

## 2025-03-26 PROCEDURE — 4004F PT TOBACCO SCREEN RCVD TLK: CPT | Performed by: FAMILY MEDICINE

## 2025-03-26 PROCEDURE — 3078F DIAST BP <80 MM HG: CPT | Performed by: FAMILY MEDICINE

## 2025-03-26 PROCEDURE — 3017F COLORECTAL CA SCREEN DOC REV: CPT | Performed by: FAMILY MEDICINE

## 2025-03-26 PROCEDURE — G8417 CALC BMI ABV UP PARAM F/U: HCPCS | Performed by: FAMILY MEDICINE

## 2025-03-26 PROCEDURE — 85027 COMPLETE CBC AUTOMATED: CPT

## 2025-03-26 PROCEDURE — 99211 OFF/OP EST MAY X REQ PHY/QHP: CPT | Performed by: FAMILY MEDICINE

## 2025-03-26 PROCEDURE — G8427 DOCREV CUR MEDS BY ELIG CLIN: HCPCS | Performed by: FAMILY MEDICINE

## 2025-03-26 RX ORDER — AMIODARONE HYDROCHLORIDE 200 MG/1
200 TABLET ORAL DAILY
Qty: 14 TABLET | OUTPATIENT
Start: 2025-03-26

## 2025-03-26 NOTE — PROGRESS NOTES
I, Valery Kaufman am scribing for and in the presence of Eliot Soriano MD, MS, F.A.C.C..     Patient: Grazyna Rojas  : 1970  Date of Admission: (Not on file)  Primary Care Physician: Alexis Maldonado  Today's Date: 3/26/2025    REASON FOR CONSULTATION: Follow-up (HX: Afib. Pt is here for hospital follow up. She states she is doing ok. She is fatigue more. She is feeling better after hospital. Denies cp, palps, sob, dizziness.  )    HPI:  Ms. Rojas is a 54 y.o. female who was admitted to the hospital for atrial fibrillation with RVR.  She presented to the emergency department with complaints of palpitations and shortness of breath. In the ER she was found to be in atrial fibrillation with RVR leading to this consultation. Lopressor was given in the emergency department with some relief of symptoms and converted back to normal sinus rhythm, however when she stood up to go to the bathroom, she converted back to atrial fibrillation with heart rate in the 130's, diltiazem drip and bolus was given at that time, and then converted back to normal sinus rhythm again. She has had no history of atrial fibrillation prior to this and had never been on anticoagulation before. Her family history consist of dad who has had a heart attack at 52 years old and hypertension and mother who has a history of hypertension. She does have a history of hypertension and hyperlipidemia as well as a history of anxiety. She is an everyday smoker and has smoked for around 24 years about a 1 to 1.5 packs per day. She had a prior Event monitor on 2023 that showed short runs of atrial tachycardia with longest being 5 beats. She had a stress test on 2023 that overall were most consistent with low risk for ischemia.     Ms. Rojas says now she is feeling better and no longer having any palpitations or shortness of breath. She says she does occasionally get palpitations prior to this admission but they will typically only

## 2025-03-27 ENCOUNTER — CARE COORDINATION (OUTPATIENT)
Dept: CARE COORDINATION | Age: 55
End: 2025-03-27

## 2025-03-27 DIAGNOSIS — I48.91 ATRIAL FIBRILLATION WITH RAPID VENTRICULAR RESPONSE (HCC): ICD-10-CM

## 2025-03-27 RX ORDER — AMIODARONE HYDROCHLORIDE 200 MG/1
200 TABLET ORAL DAILY
Qty: 90 TABLET | OUTPATIENT
Start: 2025-03-27

## 2025-03-27 NOTE — CARE COORDINATION
Care Transitions Note    Final Call     Patient Current Location:  Home: [address] [place] [id number]    Care Transition Nurse contacted the patient by telephone. Verified name and  as identifiers.    Patient graduated from the Care Transitions program on 3/27.  Patient/family progressing towards self-management. .      Advance Care Planning:   Does patient have an Advance Directive: reviewed during previous call, see note. .    Handoff:   Patient/family agreeable to ACM outreach.      Care Summary Note: Spoke with patient for final outreach. She had her follow up with Dr. Soriano yesterday, notes reviewed.  Patient being followed for new onset of A. Fib.  She has felt ok this past week, has not had any chest pains, palpitations, no bleeding issues on blood thinner.  Patient needs cardiac clearance for upcoming surgery on neck that she is to have at [place].  Surgery was postponed after her original admission and has been having issues with pain, numbness and tingling down her arm.  She is now to have cardiac cath, states Dr. Soriano is to do this in [place], if intervention is needed will have to either go to [facility] or [facility] if she requires stent.  Neck surgery has been postponed now even further until cardiac testing is complete.  She did ask about Watchman device, expressed although I don't know the requirements to have one, I typically see patients that have issues with bleeding or who have frequent falls get the Watchman.  She did say today ever since discharge she has nausea on a daily basis but no emesis.    Discussed with her we were near the end of transitional period.  Discussed having ACM involved since she is still going through cardiac testing and intervention, she was agreeable.  Will refer to Chey for continued CC.     Assessments:  Care Transitions Subsequent and Final Call    Schedule Follow Up Appointment with PCP: Completed  Subsequent and Final Calls  Do you have any ongoing symptoms?:

## 2025-03-28 ENCOUNTER — CARE COORDINATION (OUTPATIENT)
Dept: CARE COORDINATION | Age: 55
End: 2025-03-28

## 2025-03-28 ENCOUNTER — TELEPHONE (OUTPATIENT)
Dept: CARDIOLOGY | Age: 55
End: 2025-03-28

## 2025-03-28 NOTE — TELEPHONE ENCOUNTER
Ms. Rojas is also taking farnap and is on amiodarone and it can prolonged QT interval. Is this okay to take together? Please advise, thank you.

## 2025-03-28 NOTE — CARE COORDINATION
Ambulatory Care Coordination Note     3/28/2025 11:51 AM     Patient outreach attempt by this ACM today to offer care management services. AC was unable to reach the patient by telephone today;   left voice message requesting a return phone call to this ACM.     ACM: Chey Tanner, RN     Care Summary Note: CTN handoff for continued care coordination.     PCP/Specialist follow up:   Future Appointments         Provider Specialty Dept Phone    5/14/2025 11:00 AM Jazmyne Gambino, APRN - [facility] Cardiology [phone]    6/2/2025 1:00 PM Alexis Maldonado APRN - [facility] Internal Medicine [phone]    8/14/2025 10:00 AM Arvind Rowland, APRN - [facility] Pulmonology [phone]            Follow Up:   Plan for next AC outreach in approximately 1 week to complete:  - outreach attempt to offer care management services.

## 2025-04-04 ENCOUNTER — CARE COORDINATION (OUTPATIENT)
Dept: CARE COORDINATION | Age: 55
End: 2025-04-04

## 2025-04-04 NOTE — CARE COORDINATION
Ambulatory Care Coordination Note     4/4/2025 10:35 AM     Planned to make outreach call to patient today to offer care management services. Noted hospital encounter from yesterday, 4/3. Call placed to [facility]. Spoke with  who confirmed patient is listed as inpatient there, room 6510.     Future Appointments   Date Time Provider Department Center   5/14/2025 11:00 AM Jazmyne Gambino APRN - CNP TI[facility] CARD [facility]   6/2/2025  1:00 PM Alexis Maldonado APRN - CNP [facility]   8/14/2025 10:00 AM Arvind Rowland, APRN - CNP TIFF PULM [facility]       Care Coordination Plan of Care:   This nurse Care Coordinator will  - defer planned call to patient due to current inpatient status

## 2025-05-11 ENCOUNTER — HOSPITAL ENCOUNTER (EMERGENCY)
Age: 55
Discharge: HOME | End: 2025-05-11
Payer: COMMERCIAL

## 2025-05-11 VITALS
OXYGEN SATURATION: 97 % | TEMPERATURE: 98.3 F | SYSTOLIC BLOOD PRESSURE: 134 MMHG | RESPIRATION RATE: 16 BRPM | DIASTOLIC BLOOD PRESSURE: 65 MMHG | HEART RATE: 67 BPM

## 2025-05-11 DIAGNOSIS — S61.210A LACERATION OF RIGHT INDEX FINGER WITHOUT FOREIGN BODY WITHOUT DAMAGE TO NAIL, INITIAL ENCOUNTER: Primary | ICD-10-CM

## 2025-05-11 PROCEDURE — 12001 RPR S/N/AX/GEN/TRNK 2.5CM/<: CPT

## 2025-05-11 PROCEDURE — 90471 IMMUNIZATION ADMIN: CPT | Performed by: PHYSICIAN ASSISTANT

## 2025-05-11 PROCEDURE — 90715 TDAP VACCINE 7 YRS/> IM: CPT | Performed by: PHYSICIAN ASSISTANT

## 2025-05-11 PROCEDURE — 6360000002 HC RX W HCPCS: Performed by: PHYSICIAN ASSISTANT

## 2025-05-11 PROCEDURE — 99284 EMERGENCY DEPT VISIT MOD MDM: CPT

## 2025-05-11 RX ORDER — LIDOCAINE HYDROCHLORIDE 10 MG/ML
5 INJECTION, SOLUTION EPIDURAL; INFILTRATION; INTRACAUDAL; PERINEURAL ONCE
Status: COMPLETED | OUTPATIENT
Start: 2025-05-11 | End: 2025-05-11

## 2025-05-11 RX ADMIN — TETANUS TOXOID, REDUCED DIPHTHERIA TOXOID AND ACELLULAR PERTUSSIS VACCINE, ADSORBED 0.5 ML: 5; 2.5; 8; 8; 2.5 SUSPENSION INTRAMUSCULAR at 17:37

## 2025-05-11 RX ADMIN — LIDOCAINE HYDROCHLORIDE 5 ML: 10 INJECTION, SOLUTION EPIDURAL; INFILTRATION; INTRACAUDAL; PERINEURAL at 17:31

## 2025-05-11 ASSESSMENT — PAIN DESCRIPTION - LOCATION: LOCATION: FINGER (COMMENT WHICH ONE)

## 2025-05-11 ASSESSMENT — LIFESTYLE VARIABLES
HOW MANY STANDARD DRINKS CONTAINING ALCOHOL DO YOU HAVE ON A TYPICAL DAY: PATIENT DOES NOT DRINK
HOW OFTEN DO YOU HAVE A DRINK CONTAINING ALCOHOL: NEVER

## 2025-05-11 ASSESSMENT — PAIN SCALES - GENERAL: PAINLEVEL_OUTOF10: 4

## 2025-05-11 ASSESSMENT — PAIN DESCRIPTION - ORIENTATION: ORIENTATION: LEFT

## 2025-05-11 ASSESSMENT — PAIN - FUNCTIONAL ASSESSMENT
PAIN_FUNCTIONAL_ASSESSMENT: 0-10
PAIN_FUNCTIONAL_ASSESSMENT: NONE - DENIES PAIN

## 2025-05-14 ENCOUNTER — OFFICE VISIT (OUTPATIENT)
Dept: CARDIOLOGY | Age: 55
End: 2025-05-14
Payer: COMMERCIAL

## 2025-05-14 VITALS
BODY MASS INDEX: 35.16 KG/M2 | RESPIRATION RATE: 18 BRPM | HEART RATE: 62 BPM | HEIGHT: 67 IN | DIASTOLIC BLOOD PRESSURE: 46 MMHG | WEIGHT: 224 LBS | SYSTOLIC BLOOD PRESSURE: 94 MMHG

## 2025-05-14 DIAGNOSIS — I48.0 PAROXYSMAL ATRIAL FIBRILLATION (HCC): Primary | ICD-10-CM

## 2025-05-14 DIAGNOSIS — I10 PRIMARY HYPERTENSION: ICD-10-CM

## 2025-05-14 DIAGNOSIS — Z79.01 CURRENT USE OF LONG TERM ANTICOAGULATION: ICD-10-CM

## 2025-05-14 DIAGNOSIS — Z71.6 TOBACCO ABUSE COUNSELING: ICD-10-CM

## 2025-05-14 DIAGNOSIS — E78.2 MIXED HYPERLIPIDEMIA: ICD-10-CM

## 2025-05-14 PROCEDURE — G8427 DOCREV CUR MEDS BY ELIG CLIN: HCPCS | Performed by: NURSE PRACTITIONER

## 2025-05-14 PROCEDURE — 93000 ELECTROCARDIOGRAM COMPLETE: CPT | Performed by: NURSE PRACTITIONER

## 2025-05-14 PROCEDURE — G8417 CALC BMI ABV UP PARAM F/U: HCPCS | Performed by: NURSE PRACTITIONER

## 2025-05-14 PROCEDURE — 4004F PT TOBACCO SCREEN RCVD TLK: CPT | Performed by: NURSE PRACTITIONER

## 2025-05-14 PROCEDURE — 3074F SYST BP LT 130 MM HG: CPT | Performed by: NURSE PRACTITIONER

## 2025-05-14 PROCEDURE — 99214 OFFICE O/P EST MOD 30 MIN: CPT | Performed by: NURSE PRACTITIONER

## 2025-05-14 PROCEDURE — 3078F DIAST BP <80 MM HG: CPT | Performed by: NURSE PRACTITIONER

## 2025-05-14 PROCEDURE — 3017F COLORECTAL CA SCREEN DOC REV: CPT | Performed by: NURSE PRACTITIONER

## 2025-05-14 RX ORDER — METOPROLOL SUCCINATE 25 MG/1
12.5 TABLET, EXTENDED RELEASE ORAL DAILY
Qty: 45 TABLET | Refills: 1 | Status: CANCELLED | OUTPATIENT
Start: 2025-05-14

## 2025-05-14 RX ORDER — FLECAINIDE ACETATE 100 MG/1
100 TABLET ORAL 2 TIMES DAILY
Qty: 180 TABLET | Refills: 3 | Status: SHIPPED | OUTPATIENT
Start: 2025-05-14

## 2025-05-14 RX ORDER — ATENOLOL 25 MG/1
25 TABLET ORAL DAILY
Qty: 90 TABLET | Refills: 3 | Status: SHIPPED | OUTPATIENT
Start: 2025-05-14

## 2025-05-14 NOTE — PROGRESS NOTES
Patient: Grazyna Rojas  : 1970  Date of Admission: (Not on file)  Primary Care Physician: Alexis Maldonado  Today's Date: 2025    REASON FOR CONSULTATION: Follow-up (HX: Afib. Pt is here for 6 week follow up. She states she is doing well. She had heart cath done at [facility] on 4/3. Denies cp, palps, sob, dizziness. )    HPI:  Ms. Rojas is a 55 y.o. female who was admitted to the hospital for atrial fibrillation with RVR in 2025  She presented to the emergency department with complaints of palpitations and shortness of breath. In the ER she was found to be in atrial fibrillation with RVR which lead to cardiology consult.  Lopressor was given in the emergency department with some relief of symptoms and converted back to normal sinus rhythm, however when she stood up to go to the bathroom, she converted back to atrial fibrillation with heart rate in the 130's, diltiazem drip and bolus was given at that time, and then converted back to normal sinus rhythm again. She has had no history of atrial fibrillation prior to this and had never been on anticoagulation before. Her family history consist of dad who has had a heart attack at 52 years old and hypertension and mother who has a history of hypertension. She does have a history of hypertension and hyperlipidemia as well as a history of anxiety. She is an everyday smoker and has smoked for around 24 years about a 1 to 1.5 packs per day. She had a prior Event monitor on 2023 that showed short runs of atrial tachycardia with longest being 5 beats. She had a stress test on 2023 that overall were most consistent with low risk for ischemia.     Echo done 25: EF 60-65%.     Stress test done 3/12/25: Overall, these cardiac imaging results are most consistent with a low to intermediate risk for significant cardiac events.     Admitted on 2025: Atrial fibrillation with RVR- was started on Eliquis and amiodarone     Admitted to

## 2025-05-15 ENCOUNTER — HOSPITAL ENCOUNTER (OUTPATIENT)
Age: 55
Discharge: HOME | End: 2025-05-17
Payer: MEDICARE

## 2025-05-15 DIAGNOSIS — E78.2 MIXED HYPERLIPIDEMIA: ICD-10-CM

## 2025-05-15 DIAGNOSIS — I48.0 PAROXYSMAL ATRIAL FIBRILLATION (HCC): ICD-10-CM

## 2025-05-15 PROCEDURE — 93243 EXT ECG>48HR<7D SCAN A/R: CPT

## 2025-05-16 ENCOUNTER — HOSPITAL ENCOUNTER (EMERGENCY)
Age: 55
Discharge: HOME | End: 2025-05-16
Attending: STUDENT IN AN ORGANIZED HEALTH CARE EDUCATION/TRAINING PROGRAM
Payer: COMMERCIAL

## 2025-05-16 ENCOUNTER — TELEPHONE (OUTPATIENT)
Dept: CARDIOLOGY | Age: 55
End: 2025-05-16

## 2025-05-16 VITALS
WEIGHT: 224 LBS | HEART RATE: 66 BPM | OXYGEN SATURATION: 97 % | RESPIRATION RATE: 20 BRPM | BODY MASS INDEX: 35.16 KG/M2 | HEIGHT: 67 IN | TEMPERATURE: 98 F | DIASTOLIC BLOOD PRESSURE: 98 MMHG | SYSTOLIC BLOOD PRESSURE: 140 MMHG

## 2025-05-16 DIAGNOSIS — R60.0 PERIORBITAL EDEMA: ICD-10-CM

## 2025-05-16 DIAGNOSIS — J30.2 SEASONAL ALLERGIC REACTION: Primary | ICD-10-CM

## 2025-05-16 DIAGNOSIS — R21 RASH AND OTHER NONSPECIFIC SKIN ERUPTION: ICD-10-CM

## 2025-05-16 PROCEDURE — 6370000000 HC RX 637 (ALT 250 FOR IP): Performed by: STUDENT IN AN ORGANIZED HEALTH CARE EDUCATION/TRAINING PROGRAM

## 2025-05-16 PROCEDURE — 99283 EMERGENCY DEPT VISIT LOW MDM: CPT

## 2025-05-16 RX ORDER — CETIRIZINE HYDROCHLORIDE 10 MG/1
10 TABLET ORAL DAILY
Qty: 30 TABLET | Refills: 0 | Status: SHIPPED | OUTPATIENT
Start: 2025-05-16

## 2025-05-16 RX ORDER — CETIRIZINE HYDROCHLORIDE 10 MG/1
10 TABLET ORAL ONCE
Status: COMPLETED | OUTPATIENT
Start: 2025-05-16 | End: 2025-05-16

## 2025-05-16 RX ORDER — DOXYCYCLINE HYCLATE 100 MG
100 TABLET ORAL 2 TIMES DAILY
COMMUNITY
Start: 2025-05-12 | End: 2025-05-22

## 2025-05-16 RX ADMIN — CETIRIZINE HYDROCHLORIDE 10 MG: 10 TABLET, FILM COATED ORAL at 09:39

## 2025-05-16 ASSESSMENT — PAIN - FUNCTIONAL ASSESSMENT: PAIN_FUNCTIONAL_ASSESSMENT: NONE - DENIES PAIN

## 2025-05-16 NOTE — TELEPHONE ENCOUNTER
Patient called into office stating that she started flecainide yesterday, 5/15/25. She woke up today with a swollen face and a rash on the top of one of her hands. She denies SOB or any other symptoms.     Spoke with Jazmyne and Dr. Soriano, while they think it is unlikely the reaction would be from flecainide, they would like her to go to the ER to be evaluated as she is having a reaction to something that needs taken care of.    Patient was notified and voiced understanding.

## 2025-05-16 NOTE — DISCHARGE INSTRUCTIONS
At this time with your other symptoms including the congestion  the rash and swelling around the eyes is more likely to be related to seasonal allergies.  Begin taking the Zyrtec daily.  Continue to take the flecainide as previously prescribed however if your symptoms persist or worsen, discontinue medication and please return to the emergency department immediately for reassessment prior to outpatient follow up with PCP and cardiology.

## 2025-05-16 NOTE — ED PROVIDER NOTES
[facility]  EMERGENCY DEPARTMENT ENCOUNTER      Pt Name: Grazyna Rojas  MRN: [id number]  Birthdate 1970  Date of evaluation: 5/16/2025  Provider: Skyler Garcia MD    CHIEF COMPLAINT       Chief Complaint   Patient presents with    Rash     Patient presents to the emergency department with complaint of rash to hands and swelling to hands and face. Did start new medication prescribed by Dr Soriano. Patient notified Dr Soriano and he does not feel that this is related to the medication     HISTORY OF PRESENT ILLNESS      Grazyna Rojas is a 55 y.o. female who presents to the emergency department for facial swelling around her eyes, and rash on hand left > right hand. Only new change seems to be starting flecainide yesterday on 5/15/25. She called prescriber Dr. Doan's office who felt it to be unlikely related to flecainide.  Denies any shortness of breath, throat swelling, difficulty swallowing or voice change.    She does endorse some congestion over the past few days. States she does get seasonal allergies, mostly a sore throat, but has not experienced this yet this year.     REVIEW OF SYSTEMS       Review of Systems   HENT:  Positive for congestion.    Skin:  Positive for rash.     PAST MEDICAL HISTORY     Past Medical History:   Diagnosis Date    Anxiety     Bipolar affective disorder (HCC)     Chronic low back pain     Dr. Fuller [facility] neurosurgery    COPD (chronic obstructive pulmonary disease) (HCC)     Depression     Emphysema lung (HCC)     Gastroesophageal reflux disease with esophagitis without hemorrhage     Dr. Guillermo    Hyperlipidemia     Hypertension     Irritable bowel syndrome     Osteoarthritis     Prediabetes     Schizophrenia, schizo-affective type, depressed (HCC)     has had ECT tx in past       SURGICAL HISTORY       Past Surgical History:   Procedure Laterality Date    ABDOMEN SURGERY  1988    exploratory lap, ovarian tubal abscess rupture    APPENDECTOMY  1995    CARDIAC  remainder of extremities torso abdomen back.  There is puffy periorbital edema.  No edema noted otherwise to the face.   Neurological:      General: No focal deficit present.      Mental Status: She is alert and oriented to person, place, and time.         DIAGNOSTIC RESULTS     EKG:     RADIOLOGY:       Interpretation per the Radiologist below, if available at the time of this note:    No orders to display         LABS:  Labs Reviewed - No data to display    All other labs were within normal range or not returned as of this dictation.    EMERGENCY DEPARTMENT COURSE and DIFFERENTIAL DIAGNOSIS/MDM:     Medical Decision Making  There is no sign of respiratory distress or anaphylaxis on physical examination.  Patient does have periorbital edema.  Consistent with seasonal allergies.  There is a fine rash noted to the dorsum of the left hand and sparingly on the right hand.  Low suspicion for drug reaction at this time.  Will give dose of Zyrtec and monitor for progression or signs of anaphylaxis.  If improving may be discharged with continue daily Zyrtec.  Return precautions discussed.  Also discussed continuing with flecainide but should symptoms return or worsen discontinue the medication and seek reevaluation in the emergency department versus outpatient follow-up if no rash returns.    Risk  OTC drugs.               REASSESSMENT     ED Course as of 05/16/25 1340   Fri May 16, 2025   1127 Rash improving, sleeping but easily awoken. Discussed starting daily zyrtec as likely allergies. Plan for discharge with PCP follow up on Monday vs return to ED if symptoms persist/worsen.   [CP]      ED Course User Index  [CP] Skyler Garcia MD     I did send  a message through epic.  He did call back to the emergency department and I updated on evaluation.  Updated that recommendation is to continue the flecainide at this time with close monitoring outpatient versus returning if symptoms return/worsen.  Agreeable

## 2025-05-23 ENCOUNTER — RESULTS FOLLOW-UP (OUTPATIENT)
Dept: CARDIOLOGY | Age: 55
End: 2025-05-23

## 2025-05-23 NOTE — TELEPHONE ENCOUNTER
----- Message from ANÍBAL Herrera CNP sent at 5/23/2025 10:13 AM EDT -----  Please let patient know CAM monitor looked overall fairly unremarkable. Continue current treatment and follow up. Please call with questions/concerns

## 2025-05-23 NOTE — RESULT ENCOUNTER NOTE
Please let patient know CAM monitor looked overall fairly unremarkable. Continue current treatment and follow up. Please call with questions/concerns

## 2025-05-29 ENCOUNTER — TELEPHONE (OUTPATIENT)
Dept: CARDIOLOGY | Age: 55
End: 2025-05-29

## 2025-06-02 DIAGNOSIS — I50.20 SYSTOLIC CONGESTIVE HEART FAILURE, UNSPECIFIED HF CHRONICITY (HCC): Primary | ICD-10-CM

## 2025-06-11 ENCOUNTER — HOSPITAL ENCOUNTER (OUTPATIENT)
Age: 55
Discharge: HOME | End: 2025-06-11
Payer: COMMERCIAL

## 2025-06-11 ENCOUNTER — OFFICE VISIT (OUTPATIENT)
Dept: CARDIOLOGY | Age: 55
End: 2025-06-11
Payer: MEDICARE

## 2025-06-11 ENCOUNTER — RESULTS FOLLOW-UP (OUTPATIENT)
Dept: CARDIOLOGY | Age: 55
End: 2025-06-11

## 2025-06-11 VITALS
DIASTOLIC BLOOD PRESSURE: 71 MMHG | HEART RATE: 71 BPM | RESPIRATION RATE: 16 BRPM | BODY MASS INDEX: 35.63 KG/M2 | OXYGEN SATURATION: 98 % | SYSTOLIC BLOOD PRESSURE: 109 MMHG | WEIGHT: 227 LBS | HEIGHT: 67 IN

## 2025-06-11 DIAGNOSIS — Z71.6 TOBACCO ABUSE COUNSELING: ICD-10-CM

## 2025-06-11 DIAGNOSIS — R60.0 BILATERAL LOWER EXTREMITY EDEMA: ICD-10-CM

## 2025-06-11 DIAGNOSIS — Z79.01 CURRENT USE OF LONG TERM ANTICOAGULATION: ICD-10-CM

## 2025-06-11 DIAGNOSIS — I10 PRIMARY HYPERTENSION: ICD-10-CM

## 2025-06-11 DIAGNOSIS — R06.02 SHORTNESS OF BREATH: ICD-10-CM

## 2025-06-11 DIAGNOSIS — Z79.899 ENCOUNTER FOR MONITORING FLECAINIDE THERAPY: ICD-10-CM

## 2025-06-11 DIAGNOSIS — I48.0 PAROXYSMAL ATRIAL FIBRILLATION (HCC): ICD-10-CM

## 2025-06-11 DIAGNOSIS — Z51.81 ENCOUNTER FOR MONITORING FLECAINIDE THERAPY: ICD-10-CM

## 2025-06-11 DIAGNOSIS — I48.0 PAROXYSMAL ATRIAL FIBRILLATION (HCC): Primary | ICD-10-CM

## 2025-06-11 DIAGNOSIS — E78.2 MIXED HYPERLIPIDEMIA: ICD-10-CM

## 2025-06-11 LAB
ANION GAP SERPL CALCULATED.3IONS-SCNC: 12 MMOL/L (ref 9–16)
BNP SERPL-MCNC: 113 PG/ML (ref 0–125)
BUN SERPL-MCNC: 12 MG/DL (ref 6–20)
BUN/CREAT SERPL: 20 (ref 9–20)
CALCIUM SERPL-MCNC: 9.3 MG/DL (ref 8.6–10.4)
CHLORIDE SERPL-SCNC: 103 MMOL/L (ref 98–107)
CO2 SERPL-SCNC: 26 MMOL/L (ref 20–31)
CREAT SERPL-MCNC: 0.6 MG/DL (ref 0.5–0.9)
GFR, ESTIMATED: >90 ML/MIN/1.73M2
GLUCOSE SERPL-MCNC: 202 MG/DL (ref 74–99)
POTASSIUM SERPL-SCNC: 4 MMOL/L (ref 3.7–5.3)
SODIUM SERPL-SCNC: 141 MMOL/L (ref 136–145)

## 2025-06-11 PROCEDURE — 83880 ASSAY OF NATRIURETIC PEPTIDE: CPT

## 2025-06-11 PROCEDURE — 93005 ELECTROCARDIOGRAM TRACING: CPT | Performed by: NURSE PRACTITIONER

## 2025-06-11 PROCEDURE — 93010 ELECTROCARDIOGRAM REPORT: CPT | Performed by: NURSE PRACTITIONER

## 2025-06-11 PROCEDURE — 3074F SYST BP LT 130 MM HG: CPT | Performed by: NURSE PRACTITIONER

## 2025-06-11 PROCEDURE — G8427 DOCREV CUR MEDS BY ELIG CLIN: HCPCS | Performed by: NURSE PRACTITIONER

## 2025-06-11 PROCEDURE — 3017F COLORECTAL CA SCREEN DOC REV: CPT | Performed by: NURSE PRACTITIONER

## 2025-06-11 PROCEDURE — 3078F DIAST BP <80 MM HG: CPT | Performed by: NURSE PRACTITIONER

## 2025-06-11 PROCEDURE — 4004F PT TOBACCO SCREEN RCVD TLK: CPT | Performed by: NURSE PRACTITIONER

## 2025-06-11 PROCEDURE — 36415 COLL VENOUS BLD VENIPUNCTURE: CPT

## 2025-06-11 PROCEDURE — 80048 BASIC METABOLIC PNL TOTAL CA: CPT

## 2025-06-11 PROCEDURE — G8417 CALC BMI ABV UP PARAM F/U: HCPCS | Performed by: NURSE PRACTITIONER

## 2025-06-11 PROCEDURE — 99214 OFFICE O/P EST MOD 30 MIN: CPT | Performed by: NURSE PRACTITIONER

## 2025-06-11 NOTE — RESULT ENCOUNTER NOTE
Please let patient know BNP (fluid status marker) and kidney function and potassium is unremarkable. At this time I would like to do non pharmacological management for her leg swelling including limiting salt intake to less than 2 grams per day, wearing compression stockings daily as much as tolerated, and monitoring her weight and symptoms. If she were to develop worsening lower extremity edema, shortness of breath, weight gain please have her call the office. Thank you so much.

## 2025-06-11 NOTE — TELEPHONE ENCOUNTER
----- Message from ANÍBAL Herrera CNP sent at 6/11/2025  3:53 PM EDT -----  Please let patient know BNP (fluid status marker) and kidney function and potassium is unremarkable. At this time I would like to do non pharmacological management for her leg swelling including limiting salt intake to less than 2 grams per day, wearing compression stockings daily as much as tolerated, and monitoring her weight and symptoms. If she were to develop worsening lower extremity edema, shortness of breath, weight gain please have her call the office. Thank you so much.

## 2025-06-11 NOTE — PROGRESS NOTES
Patient: Grazyna Rojas  : 1970  Date of Admission: (Not on file)  Primary Care Physician: Alexis Maldonado  Today's Date: 2025    REASON FOR CONSULTATION: Atrial Fibrillation (Hx: Afib, Chr. Anticoag, HTN, HLD. CAM monitor completed. 4 week follow up. //She has been feeling okay. Since her atenolol was decreased, she's been more aware of her BP/HR feeling higher. Lightheaded once or twice, lasts just seconds. Some swelling in her feet, left is worse than right.. //Denies: CP, SOB. )    HPI:  Ms. Rojas is a 55 y.o. female who was admitted to the hospital for atrial fibrillation with RVR in 2025  She presented to the emergency department with complaints of palpitations and shortness of breath. In the ER she was found to be in atrial fibrillation with RVR which lead to cardiology consult.  Lopressor was given in the emergency department with some relief of symptoms and converted back to normal sinus rhythm, however when she stood up to go to the bathroom, she converted back to atrial fibrillation with heart rate in the 130's, diltiazem drip and bolus was given at that time, and then converted back to normal sinus rhythm again. She has had no history of atrial fibrillation prior to this and had never been on anticoagulation before. Her family history consist of dad who has had a heart attack at 52 years old and hypertension and mother who has a history of hypertension. She does have a history of hypertension and hyperlipidemia as well as a history of anxiety. She is an everyday smoker and has smoked for around 24 years about a 1 to 1.5 packs per day. She had a prior Event monitor on 2023 that showed short runs of atrial tachycardia with longest being 5 beats. She had a stress test on 2023 that overall were most consistent with low risk for ischemia.     Echo done 25: EF 60-65%.     Stress test done 3/12/25: Overall, these cardiac imaging results are most consistent with a

## 2025-07-01 ENCOUNTER — PHARMACY VISIT (OUTPATIENT)
Age: 55
End: 2025-07-01
Payer: MEDICARE

## 2025-07-01 VITALS
SYSTOLIC BLOOD PRESSURE: 144 MMHG | HEART RATE: 74 BPM | WEIGHT: 222 LBS | DIASTOLIC BLOOD PRESSURE: 66 MMHG | BODY MASS INDEX: 34.77 KG/M2 | OXYGEN SATURATION: 97 %

## 2025-07-01 DIAGNOSIS — I50.20 SYSTOLIC CONGESTIVE HEART FAILURE, UNSPECIFIED HF CHRONICITY (HCC): Primary | ICD-10-CM

## 2025-07-01 PROCEDURE — 99203 OFFICE O/P NEW LOW 30 MIN: CPT

## 2025-07-01 NOTE — PROGRESS NOTES
BEDTIME. 180 tablet 1    albuterol sulfate HFA (VENTOLIN HFA) 108 (90 Base) MCG/ACT inhaler Inhale 2 puffs into the lungs 4 times daily as needed for Wheezing 18 g 3    estradiol (ESTRACE VAGINAL) 0.1 MG/GM vaginal cream Place 1 g vaginally daily Place a pea-sized amount vaginally daily x 4 wks, then use 3 times per wk thereafter 1 each 3    traZODone (DESYREL) 100 MG tablet Take 1 tablet by mouth nightly as needed for Sleep      gabapentin (NEURONTIN) 600 MG tablet Take 1 tablet by mouth 3 times daily.      buprenorphine (BUPRENEX) 15 MCG/HR PTWK Place 1 patch onto the skin once a week.      sertraline (ZOLOFT) 100 MG tablet Take 1 tablet by mouth daily      iloperidone (FANAPT) 6 MG tablet Take 0.5 tablets by mouth 2 times daily         Objective / Assessment     Patient Active Problem List   Diagnosis Code    Epigastric pain R10.13    Post-laminectomy syndrome M96.1    Pain in thoracic spine M54.6    Neurogenic claudication R29.818    Lumbar radiculopathy M54.16    Gastroesophageal reflux disease K21.9    Chronic pain disorder G89.4    Mixed incontinence N39.46    Chronic obstructive pulmonary disease, unspecified J44.9    Atrial fibrillation with rapid ventricular response (HCC) I48.91    Hypercoagulable state due to paroxysmal atrial fibrillation (HCC) D68.69, I48.0    Abnormal stress test R94.39     Social History     Tobacco Use    Smoking status: Every Day     Current packs/day: 1.00     Average packs/day: 1 pack/day for 23.0 years (23.0 ttl pk-yrs)     Types: Cigarettes    Smokeless tobacco: Never   Vaping Use    Vaping status: Never Used   Substance Use Topics    Alcohol use: Not Currently    Drug use: Not Currently     Types: Marijuana (Weed)       Potassium (mmol/L)   Date Value   06/11/2025 4.0   05/28/2025 4.5   04/15/2025 4.1   03/26/2025 4.1   03/07/2025 4.2   02/28/2025 3.9     BUN (mg/dL)   Date Value   06/11/2025 12   05/28/2025 10   04/15/2025 18   03/26/2025 13   03/07/2025 15   02/28/2025 6

## 2025-07-01 NOTE — PATIENT INSTRUCTIONS
HEART FAILURE:    With heart failure you must follow up with your Cardiologist, your PCP and other physicians as appropriate - especially 7 days after a hospitalization and then as directed.     Please call right away with any signs or symptoms of heart failure or other medical conditions that need attention or with any questions at all. Please call your Cardiologist or your PCP or the Outpatient Heart Failure Clinic at [phone].  We can help manage these symptoms and often prevent a visit to the Emergency Room or hospitalization.       * Know your dry weight (your weight without any fluid on board)    * Call any time you have questions about anything. Do not wait.    * It is very important to take your medications as prescribed, do not miss any doses.   Call for refills before you run out. Typically when you have one week left.   If you have trouble getting your medications for any reason, call us at [phone].    * Avoid NSAIDs (non steroidal anti inflammatory drugs) like Aleve (naproxen), Advil and Motrin (ibuprofen), Mobic (diclofenac), Celebrex (celecoxib) and aspirin. A daily aspirin is okay if recommended by your physician.      It is okay to take Tylenol (acetaminophen) unless your physician has told you otherwise.    * Limit fluid intake.    Typically this is no more than 1,500-2,000 milliliters (mL) per day.     This is a liter and a half to two liters.               This is equal to approximately 48-64 ounces (oz) per day    * Limit sodium intake.   Typically this is no more than 2,000-2,400 milligrams (mg) per day.   You will need to add up the sodium content found on the nutrition label for the foods you eat each day.    * Weigh yourself every day. Weigh yourself before breakfast and after you go to the restroom.  Wear the same thing each time you weigh yourself.   Keep a log and bring it to each visit.   Call if you gain 3 or more pounds in 1-7 days - [phone]

## 2025-08-18 ENCOUNTER — HOSPITAL ENCOUNTER (OUTPATIENT)
Age: 55
Discharge: HOME | End: 2025-08-20
Payer: MEDICARE

## 2025-08-18 ENCOUNTER — HOSPITAL ENCOUNTER (EMERGENCY)
Age: 55
Discharge: HOME | End: 2025-08-19
Attending: STUDENT IN AN ORGANIZED HEALTH CARE EDUCATION/TRAINING PROGRAM
Payer: COMMERCIAL

## 2025-08-18 ENCOUNTER — APPOINTMENT (OUTPATIENT)
Dept: GENERAL RADIOLOGY | Age: 55
End: 2025-08-18
Payer: COMMERCIAL

## 2025-08-18 DIAGNOSIS — R00.2 PALPITATIONS: Primary | ICD-10-CM

## 2025-08-18 DIAGNOSIS — R00.2 PALPITATION: ICD-10-CM

## 2025-08-18 LAB
ALBUMIN SERPL-MCNC: 4.6 G/DL (ref 3.5–5.2)
ALBUMIN/GLOB SERPL: 1.6 {RATIO} (ref 1–2.5)
ALP SERPL-CCNC: 88 U/L (ref 35–104)
ALT SERPL-CCNC: 38 U/L (ref 10–35)
ANION GAP SERPL CALCULATED.3IONS-SCNC: 15 MMOL/L (ref 9–16)
AST SERPL-CCNC: 34 U/L (ref 10–35)
BASOPHILS # BLD: 0.03 K/UL (ref 0–0.2)
BASOPHILS NFR BLD: 0 % (ref 0–2)
BILIRUB SERPL-MCNC: 0.3 MG/DL (ref 0–1.2)
BILIRUB UR QL STRIP: NEGATIVE
BUN SERPL-MCNC: 11 MG/DL (ref 6–20)
BUN/CREAT SERPL: 16 (ref 9–20)
CALCIUM SERPL-MCNC: 9.5 MG/DL (ref 8.6–10.4)
CHLORIDE SERPL-SCNC: 101 MMOL/L (ref 98–107)
CLARITY UR: CLEAR
CO2 SERPL-SCNC: 24 MMOL/L (ref 20–31)
COLOR UR: YELLOW
CREAT SERPL-MCNC: 0.7 MG/DL (ref 0.5–0.9)
EOSINOPHIL # BLD: 0.1 K/UL (ref 0–0.44)
EOSINOPHILS RELATIVE PERCENT: 1 % (ref 1–4)
EPI CELLS #/AREA URNS HPF: NORMAL /HPF (ref 0–25)
ERYTHROCYTE [DISTWIDTH] IN BLOOD BY AUTOMATED COUNT: 12.2 % (ref 11.8–14.4)
GFR, ESTIMATED: >90 ML/MIN/1.73M2
GLUCOSE SERPL-MCNC: 135 MG/DL (ref 74–99)
GLUCOSE UR STRIP-MCNC: NEGATIVE MG/DL
HCT VFR BLD AUTO: 43.2 % (ref 36.3–47.1)
HGB BLD-MCNC: 14.8 G/DL (ref 11.9–15.1)
HGB UR QL STRIP.AUTO: NEGATIVE
IMM GRANULOCYTES # BLD AUTO: 0.03 K/UL (ref 0–0.3)
IMM GRANULOCYTES NFR BLD: 0 %
KETONES UR STRIP-MCNC: NEGATIVE MG/DL
LACTATE BLDV-SCNC: 1 MMOL/L (ref 0.5–2.2)
LEUKOCYTE ESTERASE UR QL STRIP: NEGATIVE
LIPASE SERPL-CCNC: 29 U/L (ref 13–60)
LYMPHOCYTES NFR BLD: 3.63 K/UL (ref 1.1–3.7)
LYMPHOCYTES RELATIVE PERCENT: 40 % (ref 24–43)
MAGNESIUM SERPL-MCNC: 1.9 MG/DL (ref 1.6–2.6)
MCH RBC QN AUTO: 31.2 PG (ref 25.2–33.5)
MCHC RBC AUTO-ENTMCNC: 34.3 G/DL (ref 28.4–34.8)
MCV RBC AUTO: 90.9 FL (ref 82.6–102.9)
MONOCYTES NFR BLD: 0.75 K/UL (ref 0.1–1.2)
MONOCYTES NFR BLD: 8 % (ref 3–12)
NEUTROPHILS NFR BLD: 51 % (ref 36–65)
NEUTS SEG NFR BLD: 4.62 K/UL (ref 1.5–8.1)
NITRITE UR QL STRIP: NEGATIVE
NRBC BLD-RTO: 0 PER 100 WBC
PH UR STRIP: 6.5 [PH] (ref 5–9)
PLATELET # BLD AUTO: 197 K/UL (ref 138–453)
PMV BLD AUTO: 10.8 FL (ref 8.1–13.5)
POTASSIUM SERPL-SCNC: 3.5 MMOL/L (ref 3.7–5.3)
PROT SERPL-MCNC: 7.4 G/DL (ref 6.6–8.7)
PROT UR STRIP-MCNC: NEGATIVE MG/DL
RBC # BLD AUTO: 4.75 M/UL (ref 3.95–5.11)
RBC #/AREA URNS HPF: NORMAL /HPF (ref 0–2)
SODIUM SERPL-SCNC: 140 MMOL/L (ref 136–145)
SP GR UR STRIP: <1.005 (ref 1.01–1.02)
TROPONIN I SERPL HS-MCNC: <6 NG/L (ref 0–14)
TROPONIN I SERPL HS-MCNC: <6 NG/L (ref 0–14)
TSH SERPL DL<=0.05 MIU/L-ACNC: 3.16 UIU/ML (ref 0.27–4.2)
UROBILINOGEN UR STRIP-ACNC: NORMAL EU/DL (ref 0–1)
WBC #/AREA URNS HPF: NORMAL /HPF (ref 0–5)
WBC OTHER # BLD: 9.2 K/UL (ref 3.5–11.3)

## 2025-08-18 PROCEDURE — 81001 URINALYSIS AUTO W/SCOPE: CPT

## 2025-08-18 PROCEDURE — 84443 ASSAY THYROID STIM HORMONE: CPT

## 2025-08-18 PROCEDURE — 83605 ASSAY OF LACTIC ACID: CPT

## 2025-08-18 PROCEDURE — 6370000000 HC RX 637 (ALT 250 FOR IP): Performed by: STUDENT IN AN ORGANIZED HEALTH CARE EDUCATION/TRAINING PROGRAM

## 2025-08-18 PROCEDURE — 96374 THER/PROPH/DIAG INJ IV PUSH: CPT

## 2025-08-18 PROCEDURE — 96361 HYDRATE IV INFUSION ADD-ON: CPT

## 2025-08-18 PROCEDURE — 93005 ELECTROCARDIOGRAM TRACING: CPT | Performed by: STUDENT IN AN ORGANIZED HEALTH CARE EDUCATION/TRAINING PROGRAM

## 2025-08-18 PROCEDURE — 80053 COMPREHEN METABOLIC PANEL: CPT

## 2025-08-18 PROCEDURE — 83735 ASSAY OF MAGNESIUM: CPT

## 2025-08-18 PROCEDURE — 2580000003 HC RX 258: Performed by: STUDENT IN AN ORGANIZED HEALTH CARE EDUCATION/TRAINING PROGRAM

## 2025-08-18 PROCEDURE — 6360000002 HC RX W HCPCS: Performed by: STUDENT IN AN ORGANIZED HEALTH CARE EDUCATION/TRAINING PROGRAM

## 2025-08-18 PROCEDURE — 99285 EMERGENCY DEPT VISIT HI MDM: CPT

## 2025-08-18 PROCEDURE — 71045 X-RAY EXAM CHEST 1 VIEW: CPT

## 2025-08-18 PROCEDURE — 85025 COMPLETE CBC W/AUTO DIFF WBC: CPT

## 2025-08-18 PROCEDURE — 84484 ASSAY OF TROPONIN QUANT: CPT

## 2025-08-18 PROCEDURE — 83690 ASSAY OF LIPASE: CPT

## 2025-08-18 PROCEDURE — 93247 EXT ECG>7D<15D SCAN A/R: CPT

## 2025-08-18 RX ORDER — 0.9 % SODIUM CHLORIDE 0.9 %
500 INTRAVENOUS SOLUTION INTRAVENOUS ONCE
Status: COMPLETED | OUTPATIENT
Start: 2025-08-18 | End: 2025-08-18

## 2025-08-18 RX ORDER — ONDANSETRON 2 MG/ML
4 INJECTION INTRAMUSCULAR; INTRAVENOUS ONCE
Status: COMPLETED | OUTPATIENT
Start: 2025-08-18 | End: 2025-08-18

## 2025-08-18 RX ADMIN — ONDANSETRON 4 MG: 2 INJECTION, SOLUTION INTRAMUSCULAR; INTRAVENOUS at 21:18

## 2025-08-18 RX ADMIN — POTASSIUM BICARBONATE 20 MEQ: 782 TABLET, EFFERVESCENT ORAL at 23:57

## 2025-08-18 RX ADMIN — SODIUM CHLORIDE 500 ML: 0.9 INJECTION, SOLUTION INTRAVENOUS at 21:17

## 2025-08-18 ASSESSMENT — ENCOUNTER SYMPTOMS
SHORTNESS OF BREATH: 0
VOMITING: 0
NAUSEA: 1
ABDOMINAL PAIN: 0

## 2025-08-18 ASSESSMENT — PAIN - FUNCTIONAL ASSESSMENT: PAIN_FUNCTIONAL_ASSESSMENT: 0-10

## 2025-08-18 ASSESSMENT — PAIN SCALES - GENERAL: PAINLEVEL_OUTOF10: 7

## 2025-08-18 ASSESSMENT — PAIN DESCRIPTION - LOCATION: LOCATION: CHEST

## 2025-08-18 ASSESSMENT — PAIN DESCRIPTION - ORIENTATION: ORIENTATION: RIGHT;LEFT

## 2025-08-19 VITALS
HEART RATE: 70 BPM | WEIGHT: 225 LBS | BODY MASS INDEX: 35.31 KG/M2 | TEMPERATURE: 97 F | RESPIRATION RATE: 16 BRPM | DIASTOLIC BLOOD PRESSURE: 81 MMHG | HEIGHT: 67 IN | SYSTOLIC BLOOD PRESSURE: 161 MMHG | OXYGEN SATURATION: 94 %

## 2025-08-19 LAB
ECHO BSA: 2.2 M2
EKG ATRIAL RATE: 88 BPM
EKG P AXIS: 63 DEGREES
EKG P-R INTERVAL: 152 MS
EKG Q-T INTERVAL: 404 MS
EKG QRS DURATION: 92 MS
EKG QTC CALCULATION (BAZETT): 486 MS
EKG R AXIS: 62 DEGREES
EKG T AXIS: 56 DEGREES
EKG VENTRICULAR RATE: 87 BPM

## 2025-08-19 PROCEDURE — 93010 ELECTROCARDIOGRAM REPORT: CPT | Performed by: INTERNAL MEDICINE

## 2025-08-21 ENCOUNTER — HOSPITAL ENCOUNTER (OUTPATIENT)
Dept: LAB | Age: 55
Discharge: HOME | End: 2025-08-21
Payer: MEDICARE

## 2025-08-21 ENCOUNTER — RESULTS FOLLOW-UP (OUTPATIENT)
Dept: CARDIOLOGY | Age: 55
End: 2025-08-21

## 2025-08-21 ENCOUNTER — OFFICE VISIT (OUTPATIENT)
Dept: CARDIOLOGY | Age: 55
End: 2025-08-21
Payer: MEDICARE

## 2025-08-21 VITALS
HEART RATE: 69 BPM | BODY MASS INDEX: 34.78 KG/M2 | DIASTOLIC BLOOD PRESSURE: 60 MMHG | WEIGHT: 221.6 LBS | HEIGHT: 67 IN | RESPIRATION RATE: 18 BRPM | SYSTOLIC BLOOD PRESSURE: 94 MMHG | OXYGEN SATURATION: 95 %

## 2025-08-21 DIAGNOSIS — Z79.899 ENCOUNTER FOR MONITORING FLECAINIDE THERAPY: ICD-10-CM

## 2025-08-21 DIAGNOSIS — I10 PRIMARY HYPERTENSION: ICD-10-CM

## 2025-08-21 DIAGNOSIS — Z51.81 ENCOUNTER FOR MONITORING FLECAINIDE THERAPY: ICD-10-CM

## 2025-08-21 DIAGNOSIS — E78.2 MIXED HYPERLIPIDEMIA: ICD-10-CM

## 2025-08-21 DIAGNOSIS — R00.2 PALPITATION: ICD-10-CM

## 2025-08-21 DIAGNOSIS — E87.6 HYPOKALEMIA: ICD-10-CM

## 2025-08-21 DIAGNOSIS — Z71.6 TOBACCO ABUSE COUNSELING: ICD-10-CM

## 2025-08-21 DIAGNOSIS — R60.0 BILATERAL LOWER EXTREMITY EDEMA: ICD-10-CM

## 2025-08-21 DIAGNOSIS — I48.0 PAROXYSMAL ATRIAL FIBRILLATION (HCC): Primary | ICD-10-CM

## 2025-08-21 DIAGNOSIS — I48.0 PAROXYSMAL ATRIAL FIBRILLATION (HCC): ICD-10-CM

## 2025-08-21 LAB — POTASSIUM SERPL-SCNC: 4 MMOL/L (ref 3.7–5.3)

## 2025-08-21 PROCEDURE — 3078F DIAST BP <80 MM HG: CPT | Performed by: NURSE PRACTITIONER

## 2025-08-21 PROCEDURE — 4004F PT TOBACCO SCREEN RCVD TLK: CPT | Performed by: NURSE PRACTITIONER

## 2025-08-21 PROCEDURE — 3074F SYST BP LT 130 MM HG: CPT | Performed by: NURSE PRACTITIONER

## 2025-08-21 PROCEDURE — G8417 CALC BMI ABV UP PARAM F/U: HCPCS | Performed by: NURSE PRACTITIONER

## 2025-08-21 PROCEDURE — 99214 OFFICE O/P EST MOD 30 MIN: CPT | Performed by: NURSE PRACTITIONER

## 2025-08-21 PROCEDURE — 93005 ELECTROCARDIOGRAM TRACING: CPT | Performed by: NURSE PRACTITIONER

## 2025-08-21 PROCEDURE — 36415 COLL VENOUS BLD VENIPUNCTURE: CPT

## 2025-08-21 PROCEDURE — 99211 OFF/OP EST MAY X REQ PHY/QHP: CPT | Performed by: NURSE PRACTITIONER

## 2025-08-21 PROCEDURE — 84132 ASSAY OF SERUM POTASSIUM: CPT

## 2025-08-21 PROCEDURE — 3017F COLORECTAL CA SCREEN DOC REV: CPT | Performed by: NURSE PRACTITIONER

## 2025-08-21 PROCEDURE — 93010 ELECTROCARDIOGRAM REPORT: CPT | Performed by: NURSE PRACTITIONER

## 2025-08-21 PROCEDURE — G8427 DOCREV CUR MEDS BY ELIG CLIN: HCPCS | Performed by: NURSE PRACTITIONER

## 2025-08-21 RX ORDER — ATENOLOL 50 MG/1
50 TABLET ORAL DAILY
Qty: 90 TABLET | Refills: 3 | Status: CANCELLED | OUTPATIENT
Start: 2025-08-21

## 2025-08-26 LAB — ECHO BSA: 2.2 M2

## (undated) DEVICE — CANNULA ORAL NSL AD CO2 N INTUB O2 DEL DISP TRU LNK

## (undated) DEVICE — PAD ADH AD ELECTRD 2 PLT W 5M CRD

## (undated) DEVICE — GUIDEWIRE WITH SPRING TIP - SINGLE USE: Brand: SAFEGUIDE®

## (undated) DEVICE — SOLUTION IV IRRIG POUR BRL 0.9% SODIUM CHL 2F7124

## (undated) DEVICE — TRAP SURG QUAD PARABOLA SLOT DSGN SFTY SCRN TRAPEASE

## (undated) DEVICE — FORCEP SPEC RETRV BX AD 2 MMX155 CM 5 MM GI OVL CUP W/ NDL

## (undated) DEVICE — MEDI-VAC NON-CONDUCTIVE TUBING7MM X 30.5 (100FT): Brand: CARDINAL HEALTH

## (undated) DEVICE — TUBING SUCT NON-STRL 9/32X100 W/CNNT

## (undated) DEVICE — Device: Brand: DISPOSABLE ELECTROSURGICAL SNARE